# Patient Record
Sex: MALE | Race: WHITE | NOT HISPANIC OR LATINO | Employment: OTHER | URBAN - METROPOLITAN AREA
[De-identification: names, ages, dates, MRNs, and addresses within clinical notes are randomized per-mention and may not be internally consistent; named-entity substitution may affect disease eponyms.]

---

## 2017-01-24 ENCOUNTER — TELEPHONE (OUTPATIENT)
Dept: FAMILY MEDICINE | Facility: CLINIC | Age: 57
End: 2017-01-24

## 2017-01-24 DIAGNOSIS — M25.519 ACUTE SHOULDER PAIN, UNSPECIFIED LATERALITY: Primary | ICD-10-CM

## 2017-01-24 NOTE — TELEPHONE ENCOUNTER
----- Message from Leida Tony sent at 1/23/2017 11:06 AM CST -----  Contact: spouse  Pt called concerning shoulder pain. Please advise [phone]

## 2017-01-24 NOTE — TELEPHONE ENCOUNTER
Stated, was seen and treated 12/19/16 left shoulder strain. Was told to call back if it did not get any better. Requesting a referral to see an ORTHO doctor within [facility] network. Please advise.

## 2017-01-27 RX ORDER — GLYBURIDE-METFORMIN HYDROCHLORIDE 5; 500 MG/1; MG/1
TABLET ORAL
Qty: 180 TABLET | Refills: 3 | Status: SHIPPED | OUTPATIENT
Start: 2017-01-27 | End: 2017-08-10 | Stop reason: SDUPTHER

## 2017-02-02 ENCOUNTER — TELEPHONE (OUTPATIENT)
Dept: FAMILY MEDICINE | Facility: CLINIC | Age: 57
End: 2017-02-02

## 2017-02-02 ENCOUNTER — PATIENT MESSAGE (OUTPATIENT)
Dept: FAMILY MEDICINE | Facility: CLINIC | Age: 57
End: 2017-02-02

## 2017-02-02 DIAGNOSIS — M25.519: Primary | ICD-10-CM

## 2017-02-02 NOTE — TELEPHONE ENCOUNTER
----- Message from Shanda Bernal sent at 2/1/2017  9:08 AM CST -----  Contact: spouse - Tammie  Calling re referral for shoulder surgeon . She states it has been over a week . He is in a lot of pain   .    [phone]    LL

## 2017-02-09 DIAGNOSIS — E11.9 TYPE 2 DIABETES MELLITUS WITHOUT COMPLICATION: ICD-10-CM

## 2017-04-21 RX ORDER — LISINOPRIL 5 MG/1
TABLET ORAL
Qty: 180 TABLET | Refills: 3 | Status: SHIPPED | OUTPATIENT
Start: 2017-04-21 | End: 2018-02-05 | Stop reason: SDUPTHER

## 2017-04-21 RX ORDER — LOVASTATIN 40 MG/1
TABLET ORAL
Qty: 90 TABLET | Refills: 3 | Status: SHIPPED | OUTPATIENT
Start: 2017-04-21 | End: 2019-04-22

## 2017-04-24 DIAGNOSIS — E11.9 DIABETES MELLITUS WITHOUT COMPLICATION: ICD-10-CM

## 2017-04-24 NOTE — TELEPHONE ENCOUNTER
----- Message from Delfina Black sent at 4/24/2017 11:18 AM CDT -----  Contact: SELF  REFILL: sitagliptin (JANUVIA) 100 MG Tab    PLEASE SEND TO [facility]

## 2017-07-27 ENCOUNTER — OFFICE VISIT (OUTPATIENT)
Dept: FAMILY MEDICINE | Facility: CLINIC | Age: 57
End: 2017-07-27
Payer: COMMERCIAL

## 2017-07-27 ENCOUNTER — LAB VISIT (OUTPATIENT)
Dept: LAB | Facility: HOSPITAL | Age: 57
End: 2017-07-27
Attending: FAMILY MEDICINE
Payer: COMMERCIAL

## 2017-07-27 VITALS
TEMPERATURE: 98 F | SYSTOLIC BLOOD PRESSURE: 106 MMHG | WEIGHT: 203.63 LBS | HEART RATE: 104 BPM | HEIGHT: 69 IN | OXYGEN SATURATION: 97 % | BODY MASS INDEX: 30.16 KG/M2 | DIASTOLIC BLOOD PRESSURE: 76 MMHG

## 2017-07-27 DIAGNOSIS — E11.59 HYPERTENSION ASSOCIATED WITH DIABETES: ICD-10-CM

## 2017-07-27 DIAGNOSIS — E11.9 TYPE 2 DIABETES MELLITUS WITHOUT COMPLICATION, WITH LONG-TERM CURRENT USE OF INSULIN: ICD-10-CM

## 2017-07-27 DIAGNOSIS — Z23 PNEUMOCOCCAL VACCINATION ADMINISTERED AT CURRENT VISIT: ICD-10-CM

## 2017-07-27 DIAGNOSIS — N52.9 ERECTILE DYSFUNCTION, UNSPECIFIED ERECTILE DYSFUNCTION TYPE: ICD-10-CM

## 2017-07-27 DIAGNOSIS — Z00.00 ROUTINE PHYSICAL EXAMINATION: ICD-10-CM

## 2017-07-27 DIAGNOSIS — Z00.00 ROUTINE PHYSICAL EXAMINATION: Primary | ICD-10-CM

## 2017-07-27 DIAGNOSIS — I15.2 HYPERTENSION ASSOCIATED WITH DIABETES: ICD-10-CM

## 2017-07-27 DIAGNOSIS — Z79.4 TYPE 2 DIABETES MELLITUS WITHOUT COMPLICATION, WITH LONG-TERM CURRENT USE OF INSULIN: ICD-10-CM

## 2017-07-27 LAB
ALBUMIN SERPL BCP-MCNC: 4 G/DL
ALP SERPL-CCNC: 68 U/L
ALT SERPL W/O P-5'-P-CCNC: 36 U/L
ANION GAP SERPL CALC-SCNC: 15 MMOL/L
AST SERPL-CCNC: 23 U/L
BASOPHILS # BLD AUTO: 0.06 K/UL
BASOPHILS NFR BLD: 0.9 %
BILIRUB SERPL-MCNC: 0.9 MG/DL
BUN SERPL-MCNC: 15 MG/DL
CALCIUM SERPL-MCNC: 9.8 MG/DL
CHLORIDE SERPL-SCNC: 102 MMOL/L
CHOLEST/HDLC SERPL: 4.4 {RATIO}
CO2 SERPL-SCNC: 21 MMOL/L
COMPLEXED PSA SERPL-MCNC: 0.47 NG/ML
CREAT SERPL-MCNC: 1.5 MG/DL
DIFFERENTIAL METHOD: NORMAL
EOSINOPHIL # BLD AUTO: 0.3 K/UL
EOSINOPHIL NFR BLD: 4.8 %
ERYTHROCYTE [DISTWIDTH] IN BLOOD BY AUTOMATED COUNT: 13.4 %
EST. GFR  (AFRICAN AMERICAN): 59.7 ML/MIN/1.73 M^2
EST. GFR  (NON AFRICAN AMERICAN): 51.7 ML/MIN/1.73 M^2
GLUCOSE SERPL-MCNC: 291 MG/DL
HCT VFR BLD AUTO: 47.1 %
HDL/CHOLESTEROL RATIO: 22.8 %
HDLC SERPL-MCNC: 180 MG/DL
HDLC SERPL-MCNC: 41 MG/DL
HGB BLD-MCNC: 16.3 G/DL
LDLC SERPL CALC-MCNC: 102 MG/DL
LYMPHOCYTES # BLD AUTO: 2.4 K/UL
LYMPHOCYTES NFR BLD: 37 %
MCH RBC QN AUTO: 30.2 PG
MCHC RBC AUTO-ENTMCNC: 34.6 G/DL
MCV RBC AUTO: 87 FL
MONOCYTES # BLD AUTO: 0.4 K/UL
MONOCYTES NFR BLD: 6.7 %
NEUTROPHILS # BLD AUTO: 3.3 K/UL
NEUTROPHILS NFR BLD: 50.3 %
NONHDLC SERPL-MCNC: 139 MG/DL
PLATELET # BLD AUTO: 203 K/UL
PMV BLD AUTO: 10.6 FL
POTASSIUM SERPL-SCNC: 4.9 MMOL/L
PROT SERPL-MCNC: 7.6 G/DL
RBC # BLD AUTO: 5.39 M/UL
SODIUM SERPL-SCNC: 138 MMOL/L
T4 FREE SERPL-MCNC: 0.87 NG/DL
TRIGL SERPL-MCNC: 185 MG/DL
TSH SERPL DL<=0.005 MIU/L-ACNC: 1.12 UIU/ML
WBC # BLD AUTO: 6.52 K/UL

## 2017-07-27 PROCEDURE — 84153 ASSAY OF PSA TOTAL: CPT

## 2017-07-27 PROCEDURE — 90732 PPSV23 VACC 2 YRS+ SUBQ/IM: CPT | Mod: S$GLB,,, | Performed by: FAMILY MEDICINE

## 2017-07-27 PROCEDURE — 86592 SYPHILIS TEST NON-TREP QUAL: CPT

## 2017-07-27 PROCEDURE — 99396 PREV VISIT EST AGE 40-64: CPT | Mod: 25,S$GLB,, | Performed by: FAMILY MEDICINE

## 2017-07-27 PROCEDURE — 83036 HEMOGLOBIN GLYCOSYLATED A1C: CPT

## 2017-07-27 PROCEDURE — 84443 ASSAY THYROID STIM HORMONE: CPT

## 2017-07-27 PROCEDURE — 36415 COLL VENOUS BLD VENIPUNCTURE: CPT | Mod: PO

## 2017-07-27 PROCEDURE — 90471 IMMUNIZATION ADMIN: CPT | Mod: S$GLB,,, | Performed by: FAMILY MEDICINE

## 2017-07-27 PROCEDURE — 80074 ACUTE HEPATITIS PANEL: CPT

## 2017-07-27 PROCEDURE — 99999 PR PBB SHADOW E&M-EST. PATIENT-LVL IV: CPT | Mod: PBBFAC,,, | Performed by: FAMILY MEDICINE

## 2017-07-27 PROCEDURE — 85025 COMPLETE CBC W/AUTO DIFF WBC: CPT

## 2017-07-27 PROCEDURE — 80061 LIPID PANEL: CPT

## 2017-07-27 PROCEDURE — 84439 ASSAY OF FREE THYROXINE: CPT

## 2017-07-27 PROCEDURE — 80053 COMPREHEN METABOLIC PANEL: CPT

## 2017-07-27 PROCEDURE — 86703 HIV-1/HIV-2 1 RESULT ANTBDY: CPT

## 2017-07-27 RX ORDER — HYDROCODONE BITARTRATE AND ACETAMINOPHEN 7.5; 325 MG/1; MG/1
TABLET ORAL
COMMUNITY
Start: 2017-05-30 | End: 2017-08-10

## 2017-07-27 RX ORDER — SILDENAFIL CITRATE 20 MG/1
20 TABLET ORAL 3 TIMES DAILY
Qty: 60 TABLET | Refills: 0 | Status: SHIPPED | OUTPATIENT
Start: 2017-07-27 | End: 2017-08-10

## 2017-07-27 RX ORDER — OXYCODONE AND ACETAMINOPHEN 10; 325 MG/1; MG/1
TABLET ORAL
COMMUNITY
Start: 2017-04-24 | End: 2017-08-10

## 2017-07-27 NOTE — PROGRESS NOTES
[facility]  Progress Note    SUBJECTIVE:     Chief Complaint   Patient presents with    Diabetes       HPI   Stanley Faustin  is a 57 y.o. male here for routine physical exam and for follow up of his chronic conditions. He admits been eating very bad and hasn't been checking his sugars. Patient has no other new complaints/problems at this time.      Review of patient's allergies indicates:  No Known Allergies    Past Medical History:   Diagnosis Date    Coronary artery disease     s/p stent    Diabetes mellitus     Diabetes mellitus type II     Hyperlipidemia     Hypertension     Myocardial infarction      Past Surgical History:   Procedure Laterality Date    CORONARY ANGIOPLASTY WITH STENT PLACEMENT      TONSILLECTOMY      UVULOPALATOPHARYNGOPLASTY      for REJI     Family History   Problem Relation Age of Onset    Diabetes Mother     Diabetes Father     Heart disease Father     Mental illness Brother      suicide    Cancer Neg Hx      prostate or colon     Social History   Substance Use Topics    Smoking status: Current Every Day Smoker     Packs/day: 0.50     Types: Cigarettes    Smokeless tobacco: Not on file      Comment:  x 34 yr.  Works at [employer].  Three kids.  Walks a lot at work.      Alcohol use Yes      Comment: only on occasion        Review of Systems   Constitutional: Negative for chills, diaphoresis and fever.   HENT: Negative for congestion, ear pain and sore throat.    Eyes: Negative for photophobia and discharge.   Respiratory: Negative for cough, shortness of breath and wheezing.    Cardiovascular: Negative for chest pain and palpitations.   Gastrointestinal: Negative for abdominal pain, constipation, diarrhea, nausea and vomiting.   Genitourinary: Negative for dysuria and hematuria.   Musculoskeletal: Negative for back pain and myalgias.   Skin: Negative for itching and rash.   Neurological: Negative for dizziness, sensory change, focal  weakness, weakness and headaches.   All other systems reviewed and are negative.    OBJECTIVE:     Vitals:    07/27/17 0948   BP: 106/76   Pulse: 104   Temp: 98.2 °F (36.8 °C)     Body mass index is 30.07 kg/m².    Physical Exam   Constitutional: He is oriented to person, place, and time and well-developed, well-nourished, and in no distress. No distress.   HENT:   Head: Normocephalic and atraumatic.   Right Ear: Tympanic membrane is not perforated, not erythematous and not bulging. No hemotympanum.   Left Ear: Tympanic membrane is not perforated, not erythematous and not bulging. No hemotympanum.   Mouth/Throat: Oropharynx is clear and moist. No oropharyngeal exudate.   Eyes: Conjunctivae and EOM are normal. Pupils are equal, round, and reactive to light.   Neck: No thyromegaly present.   Cardiovascular: Normal rate, regular rhythm and normal heart sounds.  Exam reveals no gallop and no friction rub.    No murmur heard.  Pulmonary/Chest: Effort normal and breath sounds normal. No respiratory distress. He has no wheezes. He has no rales.   Abdominal: Soft. Bowel sounds are normal. He exhibits no distension. There is no tenderness. There is no rebound and no guarding.   Musculoskeletal: Normal range of motion. He exhibits no edema or tenderness.   Lymphadenopathy:     He has no cervical adenopathy.   Neurological: He is alert and oriented to person, place, and time.   Skin: Skin is warm. No rash noted. He is not diaphoretic. No erythema.     Protective Sensation (w/ 10 gram monofilament):  Right: Intact  Left: Intact    Visual Inspection:  Normal -  Bilateral    Pedal Pulses:   Right: Present  Left: Present    Posterior tibialis:   Right:Present  Left: Present      Old records were reviewed. Labs and/or images were independently reviewed.    ASSESSMENT     1. Routine physical examination    2. Hypertension associated with diabetes    3. Type 2 diabetes mellitus without complication, with long-term current use of  insulin    4. Pneumococcal vaccination administered at current visit    5. Erectile dysfunction, unspecified erectile dysfunction type        PLAN:     Routine physical examination  -     CBC auto differential; Future  -     Comprehensive metabolic panel; Future  -     Hemoglobin A1c; Future  -     Lipid panel; Future  -     TSH; Future  -     T4, free; Future  -     PSA, Screening; Future; Expected date: 07/27/2017  -     Hepatitis panel, acute; Future; Expected date: 07/27/2017  -     RPR; Future; Expected date: 07/27/2017  -     HIV-1 and HIV-2 antibodies; Future; Expected date: 07/27/2017  -     We briefly discussed diet, exercise, and routine preventive exams. All questions and comments addressed.    Hypertension associated with diabetes   -     Stable. Continue current regimen.    Type 2 diabetes mellitus without complication, with long-term current use of insulin   -     Instructed patient to take daily glucose AM logs and to write them down to bring with on next visit. Advised patient to decrease intake of carbohydrates/simple sugars.         Pneumococcal vaccination administered at current visit  -     Pneumococcal Polysaccharide Vaccine (23 Valent) (SQ/IM)    Erectile dysfunction, unspecified erectile dysfunction type  -     sildenafil (REVATIO) 20 mg Tab; Take 1 tablet (20 mg total) by mouth 3 (three) times daily PRN.  Dispense: 60 tablet; Refill: 0  -     Gave Rx and coupon to take to pharmacy.      RTC PRN or 2 weeks for DM management.    Masood Khan MD  07/27/2017 10:07 AM

## 2017-07-28 LAB
ESTIMATED AVG GLUCOSE: 255 MG/DL
HAV IGM SERPL QL IA: NEGATIVE
HBA1C MFR BLD HPLC: 10.5 %
HBV CORE IGM SERPL QL IA: NEGATIVE
HBV SURFACE AG SERPL QL IA: NEGATIVE
HCV AB SERPL QL IA: NEGATIVE
HIV 1+2 AB+HIV1 P24 AG SERPL QL IA: NEGATIVE
RPR SER QL: NORMAL

## 2017-08-10 ENCOUNTER — OFFICE VISIT (OUTPATIENT)
Dept: FAMILY MEDICINE | Facility: CLINIC | Age: 57
End: 2017-08-10
Payer: COMMERCIAL

## 2017-08-10 VITALS
WEIGHT: 201.25 LBS | DIASTOLIC BLOOD PRESSURE: 80 MMHG | BODY MASS INDEX: 29.81 KG/M2 | SYSTOLIC BLOOD PRESSURE: 110 MMHG | OXYGEN SATURATION: 97 % | HEIGHT: 69 IN | HEART RATE: 97 BPM | TEMPERATURE: 99 F

## 2017-08-10 PROCEDURE — 99999 PR PBB SHADOW E&M-EST. PATIENT-LVL III: CPT | Mod: PBBFAC,,, | Performed by: FAMILY MEDICINE

## 2017-08-10 PROCEDURE — 3046F HEMOGLOBIN A1C LEVEL >9.0%: CPT | Mod: S$GLB,,, | Performed by: FAMILY MEDICINE

## 2017-08-10 PROCEDURE — 3079F DIAST BP 80-89 MM HG: CPT | Mod: S$GLB,,, | Performed by: FAMILY MEDICINE

## 2017-08-10 PROCEDURE — 3008F BODY MASS INDEX DOCD: CPT | Mod: S$GLB,,, | Performed by: FAMILY MEDICINE

## 2017-08-10 PROCEDURE — 99214 OFFICE O/P EST MOD 30 MIN: CPT | Mod: S$GLB,,, | Performed by: FAMILY MEDICINE

## 2017-08-10 PROCEDURE — 4010F ACE/ARB THERAPY RXD/TAKEN: CPT | Mod: S$GLB,,, | Performed by: FAMILY MEDICINE

## 2017-08-10 PROCEDURE — 3074F SYST BP LT 130 MM HG: CPT | Mod: S$GLB,,, | Performed by: FAMILY MEDICINE

## 2017-08-10 RX ORDER — GLYBURIDE-METFORMIN HYDROCHLORIDE 5; 500 MG/1; MG/1
2 TABLET ORAL 2 TIMES DAILY WITH MEALS
Qty: 180 TABLET | Refills: 3
Start: 2017-08-10 | End: 2017-09-11 | Stop reason: SDUPTHER

## 2017-08-10 RX ORDER — LANCETS
1 EACH MISCELLANEOUS 3 TIMES DAILY
Qty: 100 EACH | Refills: 5 | Status: SHIPPED | OUTPATIENT
Start: 2017-08-10

## 2017-08-10 RX ORDER — INSULIN PUMP SYRINGE, 3 ML
EACH MISCELLANEOUS
Qty: 1 EACH | Refills: 0 | Status: SHIPPED | OUTPATIENT
Start: 2017-08-10

## 2017-08-10 NOTE — PROGRESS NOTES
[facility]  Progress Note    SUBJECTIVE:     Chief Complaint   Patient presents with    Diabetes     follow up       HPI   Stanley Faustin  is a 57 y.o. male here for follow up of his diabetes. He has been trying to cut out the sugary additives. Takes his meds as directed, but FBG still in the 200s. Patient has no other new complaints/problems at this time.      Review of patient's allergies indicates:  No Known Allergies    Past Medical History:   Diagnosis Date    Coronary artery disease     s/p stent    Diabetes mellitus     Diabetes mellitus type II     Hyperlipidemia     Hypertension     Myocardial infarction      Past Surgical History:   Procedure Laterality Date    CORONARY ANGIOPLASTY WITH STENT PLACEMENT      TONSILLECTOMY      UVULOPALATOPHARYNGOPLASTY      for REJI     Family History   Problem Relation Age of Onset    Diabetes Mother     Diabetes Father     Heart disease Father     Mental illness Brother      suicide    Cancer Neg Hx      prostate or colon     Social History   Substance Use Topics    Smoking status: Current Every Day Smoker     Packs/day: 0.50     Types: Cigarettes    Smokeless tobacco: Never Used      Comment:  x 34 yr.  Works at [employer].  Three kids.  Walks a lot at work.      Alcohol use Yes      Comment: only on occasion        Review of Systems   Constitutional: Negative for chills, fever and malaise/fatigue.   HENT: Negative.    Respiratory: Negative.  Negative for cough and shortness of breath.    Cardiovascular: Negative.  Negative for chest pain.   Gastrointestinal: Negative.  Negative for abdominal pain, nausea and vomiting.   Genitourinary: Negative.    Neurological: Negative for weakness and headaches.   All other systems reviewed and are negative.    OBJECTIVE:     Vitals:    08/10/17 1319   BP: 110/80   Pulse: 97   Temp: 98.5 °F (36.9 °C)     Body mass index is 29.72 kg/m².    Physical Exam   Constitutional: He is oriented  to person, place, and time and well-developed, well-nourished, and in no distress. No distress.   HENT:   Head: Normocephalic and atraumatic.   Eyes: Conjunctivae and EOM are normal.   Cardiovascular: Normal rate, regular rhythm and normal heart sounds.  Exam reveals no gallop and no friction rub.    No murmur heard.  Pulmonary/Chest: Effort normal and breath sounds normal. No respiratory distress. He has no wheezes. He has no rales.   Abdominal: Soft. Bowel sounds are normal. He exhibits no distension. There is no tenderness.   Neurological: He is alert and oriented to person, place, and time.   Skin: Skin is warm. He is not diaphoretic.       Old records were reviewed. Labs and/or images were independently reviewed.    ASSESSMENT     1. Uncontrolled type 2 diabetes mellitus with stage 3 chronic kidney disease, without long-term current use of insulin        PLAN:     Uncontrolled type 2 diabetes mellitus with stage 3 chronic kidney disease, without long-term current use of insulin  -     glyBURIDE-metformin 5-500 mg (GLUCOVANCE) 5-500 mg Tab; Take 2 tablets by mouth 2 (two) times daily with meals.  Dispense: 180 tablet; Refill: 3  -     Start empagliflozin 10 mg Tab; Take 10 mg by mouth once daily.  Dispense: 30 tablet; Refill: 3  -     blood-glucose meter kit; Use as instructed  Dispense: 1 each; Refill: 0  -     blood sugar diagnostic Strp; 1 strip by Misc.(Non-Drug; Combo Route) route 3 (three) times daily.  Dispense: 100 strip; Refill: 5  -     lancets Misc; 1 application by Misc.(Non-Drug; Combo Route) route 3 (three) times daily.  Dispense: 100 each; Refill: 5  -     Instructed patient to take daily glucose AM logs and to write them down to bring with on next visit. Advised patient to decrease intake of carbohydrates/simple sugars.  -     Advised to drink plenty of water and stop the sugary drinks, will need to recheck kidney function to see if it is acute or chronic kidney disease.             RTC PRN or 2  weeks for DM management.    Masood Khan MD  08/10/2017 1:39 PM

## 2017-09-11 ENCOUNTER — OFFICE VISIT (OUTPATIENT)
Dept: FAMILY MEDICINE | Facility: CLINIC | Age: 57
End: 2017-09-11
Payer: COMMERCIAL

## 2017-09-11 ENCOUNTER — LAB VISIT (OUTPATIENT)
Dept: LAB | Facility: HOSPITAL | Age: 57
End: 2017-09-11
Attending: FAMILY MEDICINE
Payer: COMMERCIAL

## 2017-09-11 VITALS
WEIGHT: 199.5 LBS | OXYGEN SATURATION: 98 % | TEMPERATURE: 98 F | DIASTOLIC BLOOD PRESSURE: 82 MMHG | BODY MASS INDEX: 29.55 KG/M2 | SYSTOLIC BLOOD PRESSURE: 100 MMHG | HEIGHT: 69 IN | HEART RATE: 99 BPM

## 2017-09-11 DIAGNOSIS — I15.2 HYPERTENSION ASSOCIATED WITH DIABETES: ICD-10-CM

## 2017-09-11 DIAGNOSIS — E11.59 HYPERTENSION ASSOCIATED WITH DIABETES: ICD-10-CM

## 2017-09-11 DIAGNOSIS — Z79.4 TYPE 2 DIABETES MELLITUS WITHOUT COMPLICATION, WITH LONG-TERM CURRENT USE OF INSULIN: ICD-10-CM

## 2017-09-11 DIAGNOSIS — E11.9 TYPE 2 DIABETES MELLITUS WITHOUT COMPLICATION, WITH LONG-TERM CURRENT USE OF INSULIN: ICD-10-CM

## 2017-09-11 PROCEDURE — 82985 ASSAY OF GLYCATED PROTEIN: CPT

## 2017-09-11 PROCEDURE — 4010F ACE/ARB THERAPY RXD/TAKEN: CPT | Mod: S$GLB,,, | Performed by: FAMILY MEDICINE

## 2017-09-11 PROCEDURE — 3079F DIAST BP 80-89 MM HG: CPT | Mod: S$GLB,,, | Performed by: FAMILY MEDICINE

## 2017-09-11 PROCEDURE — 3074F SYST BP LT 130 MM HG: CPT | Mod: S$GLB,,, | Performed by: FAMILY MEDICINE

## 2017-09-11 PROCEDURE — 3008F BODY MASS INDEX DOCD: CPT | Mod: S$GLB,,, | Performed by: FAMILY MEDICINE

## 2017-09-11 PROCEDURE — 36415 COLL VENOUS BLD VENIPUNCTURE: CPT | Mod: PO

## 2017-09-11 PROCEDURE — 3046F HEMOGLOBIN A1C LEVEL >9.0%: CPT | Mod: S$GLB,,, | Performed by: FAMILY MEDICINE

## 2017-09-11 PROCEDURE — 99999 PR PBB SHADOW E&M-EST. PATIENT-LVL III: CPT | Mod: PBBFAC,,, | Performed by: FAMILY MEDICINE

## 2017-09-11 PROCEDURE — 99214 OFFICE O/P EST MOD 30 MIN: CPT | Mod: S$GLB,,, | Performed by: FAMILY MEDICINE

## 2017-09-11 RX ORDER — GLYBURIDE-METFORMIN HYDROCHLORIDE 5; 500 MG/1; MG/1
2 TABLET ORAL 2 TIMES DAILY WITH MEALS
Qty: 180 TABLET | Refills: 3 | Status: SHIPPED | OUTPATIENT
Start: 2017-09-11 | End: 2018-09-24 | Stop reason: SDUPTHER

## 2017-09-11 NOTE — PROGRESS NOTES
[facility]  Progress Note    SUBJECTIVE:     Chief Complaint   Patient presents with    Diabetes       HPI   Stanley Faustin  is a 57 y.o. male here for follow-up of his diabetes. He has cut out most of the crystal light, down to one a day. sugars have been around 80-130s. Takes meds as directed.    Review of patient's allergies indicates:  No Known Allergies    Past Medical History:   Diagnosis Date    Coronary artery disease     s/p stent    Diabetes mellitus     Diabetes mellitus type II     Hyperlipidemia     Hypertension     Myocardial infarction      Past Surgical History:   Procedure Laterality Date    CORONARY ANGIOPLASTY WITH STENT PLACEMENT      TONSILLECTOMY      UVULOPALATOPHARYNGOPLASTY      for REJI     Family History   Problem Relation Age of Onset    Diabetes Mother     Diabetes Father     Heart disease Father     Mental illness Brother      suicide    Cancer Neg Hx      prostate or colon     Social History   Substance Use Topics    Smoking status: Current Every Day Smoker     Packs/day: 0.50     Types: Cigarettes    Smokeless tobacco: Never Used      Comment:  x 34 yr.  Works at [employer].  Three kids.  Walks a lot at work.      Alcohol use Yes      Comment: only on occasion        Review of Systems   Constitutional: Negative for chills, fever and malaise/fatigue.   HENT: Negative.    Respiratory: Negative.  Negative for cough and shortness of breath.    Cardiovascular: Negative.  Negative for chest pain.   Gastrointestinal: Negative.  Negative for abdominal pain, nausea and vomiting.   Genitourinary: Negative.    Neurological: Negative for weakness and headaches.   All other systems reviewed and are negative.    OBJECTIVE:     Vitals:    09/11/17 1105   BP: 100/82   Pulse: 99   Temp: 98.4 °F (36.9 °C)     Body mass index is 29.46 kg/m².    Physical Exam   Constitutional: He is oriented to person, place, and time and well-developed, well-nourished,  and in no distress. No distress.   HENT:   Head: Normocephalic and atraumatic.   Nose: Nose normal.   Eyes: Conjunctivae and EOM are normal.   Cardiovascular: Normal rate, regular rhythm and normal heart sounds.  Exam reveals no gallop and no friction rub.    No murmur heard.  Pulmonary/Chest: Effort normal and breath sounds normal. No respiratory distress. He has no wheezes. He has no rales. He exhibits no tenderness.   Abdominal: Soft. Bowel sounds are normal. He exhibits no distension. There is no tenderness. There is no rebound.   Neurological: He is alert and oriented to person, place, and time.   Skin: Skin is warm. He is not diaphoretic.       Old records were reviewed. Labs and/or images were independently reviewed.    ASSESSMENT     1. Uncontrolled type 2 diabetes mellitus with stage 3 chronic kidney disease, without long-term current use of insulin    2. Type 2 diabetes mellitus without complication, with long-term current use of insulin    3. Hypertension associated with diabetes        PLAN:     Uncontrolled type 2 diabetes mellitus with stage 3 chronic kidney disease, without long-term current use of insulin  -     glyBURIDE-metformin 5-500 mg (GLUCOVANCE) 5-500 mg Tab; Take 2 tablets by mouth 2 (two) times daily with meals.  Dispense: 180 tablet; Refill: 3  -     Fructosamine; Future; Expected date: 09/11/2017  -     Diabetic Eye Screening Photo; Future  -     Instructed patient to take daily glucose AM logs and to write them down to bring with on next visit. Advised patient to decrease intake of carbohydrates/simple sugars.         Hypertension associated with diabetes   -     Stable. Continue current regimen.      RTC PRN or 2-3 weeks for diabetes follow-up.  More than 50% of the encounter was spent counseling patient about diabetes, in an outpatient setting. Total time spent counseling patient: 25.      Masood Khan MD  09/11/2017 11:19 AM

## 2017-09-22 LAB — FRUCTOSAMINE SERPL-SCNC: 309 UMOL /L (ref 0–285)

## 2017-10-09 ENCOUNTER — LAB VISIT (OUTPATIENT)
Dept: LAB | Facility: HOSPITAL | Age: 57
End: 2017-10-09
Attending: FAMILY MEDICINE
Payer: COMMERCIAL

## 2017-10-09 ENCOUNTER — OFFICE VISIT (OUTPATIENT)
Dept: FAMILY MEDICINE | Facility: CLINIC | Age: 57
End: 2017-10-09
Payer: COMMERCIAL

## 2017-10-09 VITALS
HEIGHT: 69 IN | HEART RATE: 98 BPM | DIASTOLIC BLOOD PRESSURE: 70 MMHG | BODY MASS INDEX: 29.73 KG/M2 | OXYGEN SATURATION: 97 % | WEIGHT: 200.75 LBS | TEMPERATURE: 98 F | SYSTOLIC BLOOD PRESSURE: 110 MMHG

## 2017-10-09 DIAGNOSIS — Z79.4 TYPE 2 DIABETES MELLITUS WITHOUT COMPLICATION, WITH LONG-TERM CURRENT USE OF INSULIN: ICD-10-CM

## 2017-10-09 DIAGNOSIS — E11.9 TYPE 2 DIABETES MELLITUS WITHOUT COMPLICATION, WITH LONG-TERM CURRENT USE OF INSULIN: Primary | ICD-10-CM

## 2017-10-09 DIAGNOSIS — I15.2 HYPERTENSION ASSOCIATED WITH DIABETES: ICD-10-CM

## 2017-10-09 DIAGNOSIS — S29.012A STRAIN OF RHOMBOID MUSCLE, INITIAL ENCOUNTER: ICD-10-CM

## 2017-10-09 DIAGNOSIS — Z79.4 TYPE 2 DIABETES MELLITUS WITHOUT COMPLICATION, WITH LONG-TERM CURRENT USE OF INSULIN: Primary | ICD-10-CM

## 2017-10-09 DIAGNOSIS — E11.9 TYPE 2 DIABETES MELLITUS WITHOUT COMPLICATION, WITH LONG-TERM CURRENT USE OF INSULIN: ICD-10-CM

## 2017-10-09 DIAGNOSIS — E11.59 HYPERTENSION ASSOCIATED WITH DIABETES: ICD-10-CM

## 2017-10-09 LAB
ALBUMIN SERPL BCP-MCNC: 3.8 G/DL
ALP SERPL-CCNC: 59 U/L
ALT SERPL W/O P-5'-P-CCNC: 36 U/L
ANION GAP SERPL CALC-SCNC: 12 MMOL/L
AST SERPL-CCNC: 28 U/L
BILIRUB SERPL-MCNC: 0.7 MG/DL
BUN SERPL-MCNC: 19 MG/DL
CALCIUM SERPL-MCNC: 10 MG/DL
CHLORIDE SERPL-SCNC: 106 MMOL/L
CO2 SERPL-SCNC: 22 MMOL/L
CREAT SERPL-MCNC: 1.4 MG/DL
EST. GFR  (AFRICAN AMERICAN): >60 ML/MIN/1.73 M^2
EST. GFR  (NON AFRICAN AMERICAN): 56.2 ML/MIN/1.73 M^2
ESTIMATED AVG GLUCOSE: 169 MG/DL
GLUCOSE SERPL-MCNC: 188 MG/DL
HBA1C MFR BLD HPLC: 7.5 %
POTASSIUM SERPL-SCNC: 5.4 MMOL/L
PROT SERPL-MCNC: 7.5 G/DL
SODIUM SERPL-SCNC: 140 MMOL/L

## 2017-10-09 PROCEDURE — 99999 PR PBB SHADOW E&M-EST. PATIENT-LVL III: CPT | Mod: PBBFAC,,, | Performed by: FAMILY MEDICINE

## 2017-10-09 PROCEDURE — 36415 COLL VENOUS BLD VENIPUNCTURE: CPT | Mod: PO

## 2017-10-09 PROCEDURE — 83036 HEMOGLOBIN GLYCOSYLATED A1C: CPT

## 2017-10-09 PROCEDURE — 80053 COMPREHEN METABOLIC PANEL: CPT

## 2017-10-09 PROCEDURE — 99214 OFFICE O/P EST MOD 30 MIN: CPT | Mod: S$GLB,,, | Performed by: FAMILY MEDICINE

## 2017-10-09 RX ORDER — CYCLOBENZAPRINE HCL 5 MG
5 TABLET ORAL 3 TIMES DAILY PRN
Qty: 30 TABLET | Refills: 0 | Status: SHIPPED | OUTPATIENT
Start: 2017-10-09 | End: 2018-01-25

## 2017-10-09 RX ORDER — DICLOFENAC SODIUM 10 MG/G
2 GEL TOPICAL 4 TIMES DAILY
Qty: 100 G | Refills: 0 | Status: SHIPPED | OUTPATIENT
Start: 2017-10-09 | End: 2019-02-04 | Stop reason: SDUPTHER

## 2017-10-09 NOTE — PROGRESS NOTES
[facility]  Progress Note    SUBJECTIVE:     Chief Complaint   Patient presents with    Back Pain       HPI   Stanley Faustin  is a 57 y.o. male here for c/o right upper back pain. Onset was over a week ago. He does do some heavy lifting. Rates pain as moderate. Certain positions make it worst. Denies any falls/trauma. For his diabetes, he also has been trying to cut back on the carbs. Thinks his sugars are much improved. No chest pain, SOB, visual changes.     Review of patient's allergies indicates:  No Known Allergies    Past Medical History:   Diagnosis Date    Coronary artery disease     s/p stent    Diabetes mellitus     Diabetes mellitus type II     Hyperlipidemia     Hypertension     Myocardial infarction      Past Surgical History:   Procedure Laterality Date    CORONARY ANGIOPLASTY WITH STENT PLACEMENT      TONSILLECTOMY      UVULOPALATOPHARYNGOPLASTY      for REJI     Family History   Problem Relation Age of Onset    Diabetes Mother     Diabetes Father     Heart disease Father     Mental illness Brother      suicide    Cancer Neg Hx      prostate or colon     Social History   Substance Use Topics    Smoking status: Current Every Day Smoker     Packs/day: 0.50     Types: Cigarettes    Smokeless tobacco: Never Used      Comment:  x 34 yr.  Works at [employer].  Three kids.  Walks a lot at work.      Alcohol use Yes      Comment: only on occasion        Review of Systems   Constitutional: Negative for chills, fever and malaise/fatigue.   Respiratory: Negative.    Cardiovascular: Negative.    Gastrointestinal: Negative for abdominal pain, constipation and diarrhea.   Musculoskeletal: Positive for back pain. Negative for falls, myalgias and neck pain.   Neurological: Negative for weakness and headaches.     OBJECTIVE:     Vitals:    10/09/17 0830   BP: 110/70   Pulse: 98   Temp: 98.1 °F (36.7 °C)     Body mass index is 29.64 kg/m².    Physical Exam    Constitutional: He is oriented to person, place, and time. He appears distressed (mild).   HENT:   Head: Normocephalic and atraumatic.   Neck: Normal range of motion. Neck supple.   Musculoskeletal: He exhibits tenderness (to palpation of R rhomboid muscle). He exhibits no edema.   Neurological: He is alert and oriented to person, place, and time. No cranial nerve deficit.   Skin: Skin is warm. He is not diaphoretic.       Old records were reviewed. Labs and/or images were independently reviewed.    ASSESSMENT     1. Type 2 diabetes mellitus without complication, with long-term current use of insulin    2. Hypertension associated with diabetes    3. Strain of rhomboid muscle, initial encounter        PLAN:     Type 2 diabetes mellitus without complication, with long-term current use of insulin  -     Hemoglobin A1c; Future  -     Comprehensive metabolic panel; Future  -     Instructed patient to take daily glucose AM logs and to write them down to bring with on next visit. Advised patient to decrease intake of carbohydrates/simple sugars.         Hypertension associated with diabetes   -     Stable. Continue current regimen.    Strain of rhomboid muscle, initial encounter  -     Start diclofenac sodium (VOLTAREN) 1 % Gel; Apply 2 g topically 4 (four) times daily.  Dispense: 100 g; Refill: 0  -     Start cyclobenzaprine (FLEXERIL) 5 MG tablet; Take 1 tablet (5 mg total) by mouth 3 (three) times daily as needed for Muscle spasms.  Dispense: 30 tablet; Refill: 0  -     Patient counseled on good posture and stretching exercises. Continue to place ice packs on affected areas 3-4 times daily. Take medications as prescribed. Instructed patient to call MD if symptoms persist or worsen.    RTC KANDY Khan MD  10/09/2017 8:45 AM

## 2017-11-15 ENCOUNTER — OFFICE VISIT (OUTPATIENT)
Dept: FAMILY MEDICINE | Facility: CLINIC | Age: 57
End: 2017-11-15
Payer: COMMERCIAL

## 2017-11-15 VITALS
TEMPERATURE: 98 F | OXYGEN SATURATION: 97 % | SYSTOLIC BLOOD PRESSURE: 116 MMHG | HEART RATE: 102 BPM | HEIGHT: 69 IN | WEIGHT: 197.88 LBS | BODY MASS INDEX: 29.31 KG/M2 | DIASTOLIC BLOOD PRESSURE: 86 MMHG

## 2017-11-15 DIAGNOSIS — E11.22 CONTROLLED TYPE 2 DIABETES MELLITUS WITH STAGE 3 CHRONIC KIDNEY DISEASE, WITHOUT LONG-TERM CURRENT USE OF INSULIN: ICD-10-CM

## 2017-11-15 DIAGNOSIS — I15.2 HYPERTENSION ASSOCIATED WITH DIABETES: ICD-10-CM

## 2017-11-15 DIAGNOSIS — M72.2 PLANTAR FASCIITIS OF LEFT FOOT: Primary | ICD-10-CM

## 2017-11-15 DIAGNOSIS — E11.59 HYPERTENSION ASSOCIATED WITH DIABETES: ICD-10-CM

## 2017-11-15 DIAGNOSIS — N18.30 CONTROLLED TYPE 2 DIABETES MELLITUS WITH STAGE 3 CHRONIC KIDNEY DISEASE, WITHOUT LONG-TERM CURRENT USE OF INSULIN: ICD-10-CM

## 2017-11-15 PROCEDURE — 99214 OFFICE O/P EST MOD 30 MIN: CPT | Mod: S$GLB,,, | Performed by: FAMILY MEDICINE

## 2017-11-15 PROCEDURE — 99999 PR PBB SHADOW E&M-EST. PATIENT-LVL III: CPT | Mod: PBBFAC,,, | Performed by: FAMILY MEDICINE

## 2017-11-15 NOTE — PROGRESS NOTES
[facility]  Progress Note    SUBJECTIVE:     Chief Complaint   Patient presents with    Foot Pain     left heel pain        HPI   Stanley Faustin  is a 57 y.o. male here for c/o left heel pain. This started about a week ago. It is worst in the AM, when he tries to take a few steps. Tried otc meds without relief. No falls/trauma.     Review of patient's allergies indicates:  No Known Allergies    Past Medical History:   Diagnosis Date    Coronary artery disease     s/p stent    Diabetes mellitus     Diabetes mellitus type II     Hyperlipidemia     Hypertension     Myocardial infarction      Past Surgical History:   Procedure Laterality Date    CORONARY ANGIOPLASTY WITH STENT PLACEMENT      TONSILLECTOMY      UVULOPALATOPHARYNGOPLASTY      for REJI     Family History   Problem Relation Age of Onset    Diabetes Mother     Diabetes Father     Heart disease Father     Mental illness Brother      suicide    Cancer Neg Hx      prostate or colon     Social History   Substance Use Topics    Smoking status: Current Every Day Smoker     Packs/day: 0.50     Types: Cigarettes    Smokeless tobacco: Never Used      Comment:  x 34 yr.  Works at [employer].  Three kids.  Walks a lot at work.      Alcohol use Yes      Comment: only on occasion        Review of Systems   Constitutional: Negative for chills and fever.   Musculoskeletal: Positive for joint pain (left heel pain). Negative for falls.   Skin: Negative for itching and rash.     OBJECTIVE:     Vitals:    11/15/17 1020   BP: 116/86   Pulse: 102   Temp: 98 °F (36.7 °C)     Body mass index is 29.22 kg/m².    Physical Exam   Constitutional: He is oriented to person, place, and time. He appears distressed (mild).   HENT:   Head: Normocephalic and atraumatic.   Eyes: Conjunctivae and EOM are normal.   Pulmonary/Chest: Effort normal. No respiratory distress.   Musculoskeletal: He exhibits tenderness (to palpation of left plantar  fascia at heel. good ROM . no erythema or obvious fractures/dislocations).   Neurological: He is alert and oriented to person, place, and time.   Skin: Skin is warm. He is not diaphoretic.       Old records were reviewed. Labs and/or images were independently reviewed.    ASSESSMENT     1. Plantar fasciitis of left foot    2. Controlled type 2 diabetes mellitus with stage 3 chronic kidney disease, without long-term current use of insulin    3. Hypertension associated with diabetes        PLAN:     Plantar fasciitis of left foot   -     Patient counseled on good posture and stretching exercises. Continue to place ice packs on affected areas 3-4 times daily. Take medications as prescribed. Instructed patient to call MD if symptoms persist or worsen.    Controlled type 2 diabetes mellitus with stage 3 chronic kidney disease, without long-term current use of insulin  -     empagliflozin 10 mg Tab; Take 10 mg by mouth once daily.  Dispense: 90 tablet; Refill: 3    Hypertension associated with diabetes   -     Stable. Continue current regimen.      RTC PRN  More than 50% of the encounter was spent counseling patient about plantar fasicitis, in an outpatient setting. Total time spent counseling patient: 25.      Masood Khan MD  11/15/2017 10:37 AM

## 2017-12-05 DIAGNOSIS — N18.30 CONTROLLED TYPE 2 DIABETES MELLITUS WITH STAGE 3 CHRONIC KIDNEY DISEASE, WITHOUT LONG-TERM CURRENT USE OF INSULIN: ICD-10-CM

## 2017-12-05 DIAGNOSIS — E11.22 CONTROLLED TYPE 2 DIABETES MELLITUS WITH STAGE 3 CHRONIC KIDNEY DISEASE, WITHOUT LONG-TERM CURRENT USE OF INSULIN: ICD-10-CM

## 2017-12-13 ENCOUNTER — TELEPHONE (OUTPATIENT)
Dept: OPHTHALMOLOGY | Facility: CLINIC | Age: 57
End: 2017-12-13

## 2017-12-13 ENCOUNTER — OFFICE VISIT (OUTPATIENT)
Dept: OPTOMETRY | Facility: CLINIC | Age: 57
End: 2017-12-13
Payer: COMMERCIAL

## 2017-12-13 ENCOUNTER — OFFICE VISIT (OUTPATIENT)
Dept: FAMILY MEDICINE | Facility: CLINIC | Age: 57
End: 2017-12-13
Payer: COMMERCIAL

## 2017-12-13 ENCOUNTER — LAB VISIT (OUTPATIENT)
Dept: LAB | Facility: HOSPITAL | Age: 57
End: 2017-12-13
Attending: FAMILY MEDICINE
Payer: COMMERCIAL

## 2017-12-13 VITALS
OXYGEN SATURATION: 97 % | HEART RATE: 106 BPM | DIASTOLIC BLOOD PRESSURE: 88 MMHG | HEIGHT: 69 IN | SYSTOLIC BLOOD PRESSURE: 128 MMHG | TEMPERATURE: 98 F

## 2017-12-13 DIAGNOSIS — H57.10 PAIN IN EYE, UNSPECIFIED LATERALITY: Primary | ICD-10-CM

## 2017-12-13 DIAGNOSIS — H57.11 EYE PAIN, RIGHT: Primary | ICD-10-CM

## 2017-12-13 DIAGNOSIS — E11.9 TYPE 2 DIABETES MELLITUS WITHOUT RETINOPATHY: ICD-10-CM

## 2017-12-13 DIAGNOSIS — R51.9 OCULAR HEADACHE: ICD-10-CM

## 2017-12-13 DIAGNOSIS — H25.13 NUCLEAR SCLEROSIS OF BOTH EYES: ICD-10-CM

## 2017-12-13 DIAGNOSIS — Z13.5 SCREENING FOR EYE CONDITION: ICD-10-CM

## 2017-12-13 DIAGNOSIS — R51.9 OCULAR HEADACHE: Primary | ICD-10-CM

## 2017-12-13 LAB
CRP SERPL-MCNC: 9.5 MG/L
ERYTHROCYTE [SEDIMENTATION RATE] IN BLOOD BY WESTERGREN METHOD: 5 MM/HR

## 2017-12-13 PROCEDURE — 36415 COLL VENOUS BLD VENIPUNCTURE: CPT | Mod: PO

## 2017-12-13 PROCEDURE — 99999 PR PBB SHADOW E&M-EST. PATIENT-LVL IV: CPT | Mod: PBBFAC,,, | Performed by: FAMILY MEDICINE

## 2017-12-13 PROCEDURE — 99214 OFFICE O/P EST MOD 30 MIN: CPT | Mod: S$GLB,,, | Performed by: FAMILY MEDICINE

## 2017-12-13 PROCEDURE — 92004 COMPRE OPH EXAM NEW PT 1/>: CPT | Mod: S$GLB,,, | Performed by: OPTOMETRIST

## 2017-12-13 PROCEDURE — 99999 PR PBB SHADOW E&M-EST. PATIENT-LVL II: CPT | Mod: PBBFAC,,, | Performed by: OPTOMETRIST

## 2017-12-13 PROCEDURE — 85651 RBC SED RATE NONAUTOMATED: CPT

## 2017-12-13 PROCEDURE — 86140 C-REACTIVE PROTEIN: CPT

## 2017-12-13 NOTE — LETTER
December 17, 2017      Masood hKan MD  [address]  [place] [address]           [facility]  [address]  [facility] [address]  Phone: [phone]  Fax: [phone]          Patient: Stanley Faustin   MR Number: [id number]   YOB: 1960   Date of Visit: 12/13/2017       Dear Dr. Masood Khan:    Thank you for referring Stanley Faustin to me for evaluation. Attached you will find relevant portions of my assessment and plan of care.    If you have questions, please do not hesitate to call me. I look forward to following Stanley Faustin along with you.    Sincerely,    Lucian Hansen, OD    Enclosure  CC:  No Recipients    If you would like to receive this communication electronically, please contact [email or web address] or [phone] to request more information on [facility] access.    For providers and/or their staff who would like to refer a patient to [facility], please contact us through our one-stop-shop provider referral line, [facility], at [phone].    If you feel you have received this communication in error or would no longer like to receive these types of communications, please e-mail [email or web address]

## 2017-12-13 NOTE — PROGRESS NOTES
[facility]  Progress Note    SUBJECTIVE:     Chief Complaint   Patient presents with    Eye Pain       HPI   Stanley Faustin  is a 57 y.o. male here for having occasional right orbital pain. This started a few months ago where these episodes last about 10-30 seconds, sharp, shoooting pain in the right orbital area. It goes away on its own. Past week or 2, it happened again. No visual changes, migraines, chest pain, SOB. Unsure what caused it.     Review of patient's allergies indicates:  No Known Allergies    Past Medical History:   Diagnosis Date    Coronary artery disease     s/p stent    Diabetes mellitus     Diabetes mellitus type II     Hyperlipidemia     Hypertension     Myocardial infarction      Past Surgical History:   Procedure Laterality Date    CORONARY ANGIOPLASTY WITH STENT PLACEMENT      TONSILLECTOMY      UVULOPALATOPHARYNGOPLASTY      for REJI     Family History   Problem Relation Age of Onset    Diabetes Mother     Diabetes Father     Heart disease Father     Mental illness Brother      suicide    Cancer Neg Hx      prostate or colon     Social History   Substance Use Topics    Smoking status: Current Every Day Smoker     Packs/day: 0.50     Types: Cigarettes    Smokeless tobacco: Never Used      Comment:  x 34 yr.  Works at [employer].  Three kids.  Walks a lot at work.      Alcohol use Yes      Comment: only on occasion        Review of Systems   Constitutional: Negative for chills and fever.   HENT:        + right orbital pain   Eyes: Negative for blurred vision, double vision, photophobia, pain, discharge and redness.   Neurological: Negative for sensory change, speech change, focal weakness and headaches.     OBJECTIVE:     Vitals:    12/13/17 1112   BP: 128/88   Pulse: 106   Temp: 98.2 °F (36.8 °C)     There is no height or weight on file to calculate BMI.    Physical Exam   Constitutional: He is oriented to person, place, and time and  well-developed, well-nourished, and in no distress. No distress.   HENT:   Head: Normocephalic and atraumatic.   Nose: Nose normal.   No temporal tenderness   Eyes: Conjunctivae and EOM are normal. Pupils are equal, round, and reactive to light. Right eye exhibits no discharge. Left eye exhibits no discharge. No scleral icterus.   Cardiovascular: Normal rate, regular rhythm and normal heart sounds.  Exam reveals no gallop and no friction rub.    No murmur heard.  Pulmonary/Chest: Effort normal and breath sounds normal. No respiratory distress. He has no wheezes. He has no rales. He exhibits no tenderness.   Abdominal: Soft. Bowel sounds are normal. He exhibits no distension. There is no tenderness. There is no rebound.   Neurological: He is alert and oriented to person, place, and time.   CN 2-12 grossly intact. Motor 5/5 in all extremities   Skin: Skin is warm. He is not diaphoretic.       Old records were reviewed. Labs and/or images were independently reviewed.    ASSESSMENT     1. Ocular headache        PLAN:     Ocular headache  -     Sedimentation rate, manual; Future; Expected date: 12/13/2017  -     C-reactive protein; Future; Expected date: 12/13/2017  -     Ambulatory referral to Optometry  -     Will obtain inflammatory markers to ensure it is not GCA. Will refer to opto to r/o intraocular abnormalities. Consider treating as cluster headache if above work up is negative.       RTC KANDY Khan MD  12/13/2017 11:33 AM

## 2017-12-13 NOTE — PATIENT INSTRUCTIONS
No problem found to explain symptom of recurring/intermittent pain in the temporal brow area on the right side.  Trace nuclear sclerosis of lens of both eyes, consistent with age.  Good ocular health, otherwise.  Intraocular pressure within normal range.  Heathy optic nerve in each eye.  Excellent unaided distance visual acuity.    Okay to continue to use reading glasses for reading and close work.    Discussed with Mr. Faustin and his wife.  Suggest return to PCP for consideration of need for imaging (CT vs MRI) to help rule out occult etiology for symptoms.

## 2017-12-13 NOTE — PROGRESS NOTES
HPI     Patient is in stating he's been having intermittent pain in temporal brow   area on right side - not in the eye, itself.  Patient states it started   happening 11/25/2017 - it lasted a week and went away. Patient states the   pain recurred this past week and it's getting worse. The pain only last   about 10 seconds at a time and now it's happening about 15 times a day.     Diabetic  yesterday - NIDDM      Last A1C 7.5 approx two months ago.   + HBP - takes meds and states well-controlled.   (-) blurry vision  (-) flashes of light   (-) floaters       Last eye examination in July, 2017, by Dr. Eliel Contreras.  Rx in glasses changed at that time.  Has one pair of bifocal lenses and   one pair of reading glasses.  Rarely uses distance correction, as he is happy with unaided distance [facility]     Uses glasses for reading only.      Last edited by Lucian Hansen, OD on 12/13/2017  2:14 PM. (History)            Assessment /Plan     For exam results, see Encounter Report.    1. Eye pain, right     2. Nuclear sclerosis of both eyes     3. Type 2 diabetes mellitus without retinopathy     4. Screening for eye condition                    No problem found to explain symptom of recurring/intermittent pain in the temporal brow area on the right side.  Trace nuclear sclerosis of lens of both eyes, consistent with age.  Good ocular health, otherwise.  Intraocular pressure within normal range.  Heathy optic nerve in each eye.  Excellent unaided distance visual acuity.    Okay to continue to use reading glasses for reading and close work.    Discussed with Mr. Faustin and his wife.  Suggest return to PCP for consideration of need for imaging (CT vs MRI) to help rule out occult etiology for symptoms.

## 2017-12-18 ENCOUNTER — PATIENT MESSAGE (OUTPATIENT)
Dept: FAMILY MEDICINE | Facility: CLINIC | Age: 57
End: 2017-12-18

## 2017-12-18 DIAGNOSIS — R51.9 NONINTRACTABLE HEADACHE, UNSPECIFIED CHRONICITY PATTERN, UNSPECIFIED HEADACHE TYPE: Primary | ICD-10-CM

## 2017-12-20 ENCOUNTER — TELEPHONE (OUTPATIENT)
Dept: FAMILY MEDICINE | Facility: CLINIC | Age: 57
End: 2017-12-20

## 2018-01-25 ENCOUNTER — OFFICE VISIT (OUTPATIENT)
Dept: NEUROLOGY | Facility: CLINIC | Age: 58
End: 2018-01-25
Payer: COMMERCIAL

## 2018-01-25 VITALS
HEIGHT: 69 IN | DIASTOLIC BLOOD PRESSURE: 68 MMHG | HEART RATE: 105 BPM | BODY MASS INDEX: 29.33 KG/M2 | SYSTOLIC BLOOD PRESSURE: 119 MMHG | WEIGHT: 198 LBS

## 2018-01-25 DIAGNOSIS — G44.019 EPISODIC CLUSTER HEADACHE, NOT INTRACTABLE: Primary | ICD-10-CM

## 2018-01-25 PROCEDURE — 3008F BODY MASS INDEX DOCD: CPT | Mod: S$GLB,,, | Performed by: NEUROLOGICAL SURGERY

## 2018-01-25 PROCEDURE — 99204 OFFICE O/P NEW MOD 45 MIN: CPT | Mod: S$GLB,,, | Performed by: NEUROLOGICAL SURGERY

## 2018-01-25 PROCEDURE — 99999 PR PBB SHADOW E&M-EST. PATIENT-LVL III: CPT | Mod: PBBFAC,,, | Performed by: NEUROLOGICAL SURGERY

## 2018-01-25 NOTE — LETTER
January 31, 2018      Masood Khan MD  [address] [address]           [facility] [address] [facility] [address]  [facility] [address]  Phone: [phone]  Fax: [phone]          Patient: Stanley Faustin   MR Number: [id number]   YOB: 1960   Date of Visit: 1/25/2018       Dear Dr. Masood Khan:    Thank you for referring Stanley Faustin to me for evaluation. Attached you will find relevant portions of my assessment and plan of care.    If you have questions, please do not hesitate to call me. I look forward to following Stanley Faustin along with you.    Sincerely,    Eddy Woodruff MD    Enclosure  CC:  No Recipients    If you would like to receive this communication electronically, please contact [email or web address] or [phone] to request more information on [facility] access.    For providers and/or their staff who would like to refer a patient to [facility], please contact us through our one-stop-shop provider referral line, [facility], at [phone].    If you feel you have received this communication in error or would no longer like to receive these types of communications, please e-mail [email or web address]

## 2018-01-29 ENCOUNTER — HOSPITAL ENCOUNTER (OUTPATIENT)
Dept: RADIOLOGY | Facility: HOSPITAL | Age: 58
Discharge: HOME | End: 2018-01-29
Attending: NEUROLOGICAL SURGERY
Payer: COMMERCIAL

## 2018-01-29 DIAGNOSIS — G44.019 EPISODIC CLUSTER HEADACHE, NOT INTRACTABLE: ICD-10-CM

## 2018-01-29 PROCEDURE — 70498 CT ANGIOGRAPHY NECK: CPT | Mod: 26,,, | Performed by: RADIOLOGY

## 2018-01-29 PROCEDURE — 70496 CT ANGIOGRAPHY HEAD: CPT | Mod: 26,,, | Performed by: RADIOLOGY

## 2018-01-29 PROCEDURE — 25500020 PHARM REV CODE 255: Performed by: NEUROLOGICAL SURGERY

## 2018-01-29 PROCEDURE — 70496 CT ANGIOGRAPHY HEAD: CPT | Mod: TC

## 2018-01-29 RX ADMIN — IOHEXOL 75 ML: 350 INJECTION, SOLUTION INTRAVENOUS at 03:01

## 2018-01-29 NOTE — PROGRESS NOTES
Chief Complaint   Patient presents with    Headache        Stanley Faustin is a 57 y.o. male with a history of multiple medical diagnoses as listed below that presents for evaluation of headaches.  He says the headaches began several months ago.  The pain is described as a sharp sensation that happens mostly along the frontal portion of the head and the pain can get very intense very quickly reaches a 10 out of 10.  The pain lasts several seconds up to about a minute before subsides on its own.  There is no associated symptoms when he has the headache.  He denies any nausea, vomiting, tearing of the eye, or redness of the eye.  He has no warning prior to onset of these pains.  He has not had any pains like these in the past.  He has no family history of headaches.     PAST MEDICAL HISTORY:  Past Medical History:   Diagnosis Date    Coronary artery disease     s/p stent    Diabetes mellitus     Diabetes mellitus type II     Hyperlipidemia     Hypertension     Myocardial infarction        PAST SURGICAL HISTORY:  Past Surgical History:   Procedure Laterality Date    CORONARY ANGIOPLASTY WITH STENT PLACEMENT      TONSILLECTOMY      UVULOPALATOPHARYNGOPLASTY      for REJI       SOCIAL HISTORY:  Social History     Social History    Marital status:      Spouse name: N/A    Number of children: N/A    Years of education: N/A     Occupational History     [employer]     Social History Main Topics    Smoking status: Current Every Day Smoker     Packs/day: 0.50     Types: Cigarettes    Smokeless tobacco: Never Used      Comment:  x 34 yr.  Works at [email or web address].  Three kids.  Walks a lot at work.      Alcohol use Yes      Comment: only on occasion    Drug use: No    Sexual activity: Yes     Partners: Female     Other Topics Concern    Not on file     Social History Narrative    No narrative on file       FAMILY HISTORY:  Family History   Problem Relation Age of Onset    Diabetes  Mother     Diabetes Father     Heart disease Father     Mental illness Brother      suicide    Cancer Neg Hx      prostate or colon       ALLERGIES AND MEDICATIONS: updated and reviewed.  Review of patient's allergies indicates:  No Known Allergies  Current Outpatient Prescriptions   Medication Sig Dispense Refill    aspirin (ECOTRIN) 81 MG EC tablet Take 81 mg by mouth once daily.      blood sugar diagnostic Strp 1 strip by Misc.(Non-Drug; Combo Route) route 3 (three) times daily. 100 strip 5    blood-glucose meter kit Use as instructed 1 each 0    clotrimazole-betamethasone 1-0.05% (LOTRISONE) cream APPLY 1 TO 2 TIMES A DAY AS NEEDED 45 g 2    diclofenac sodium (VOLTAREN) 1 % Gel Apply 2 g topically 4 (four) times daily. 100 g 0    empagliflozin 10 mg Tab Take 10 mg by mouth once daily. 90 tablet 3    glyBURIDE-metformin 5-500 mg (GLUCOVANCE) 5-500 mg Tab Take 2 tablets by mouth 2 (two) times daily with meals. 180 tablet 3    lancets Misc 1 application by Misc.(Non-Drug; Combo Route) route 3 (three) times daily. 100 each 5    lisinopril (PRINIVIL,ZESTRIL) 5 MG tablet TAKE 1 TABLET BY MOUTH 2 (TWO ) TIMES DAILY 180 tablet 3    lovastatin (MEVACOR) 40 MG tablet TAKE 1 TABLET BY MOUTH AT BEDTIME FOR CHOLESTEROL CONTROL 90 tablet 3    nitroGLYCERIN (NITROSTAT) 0.4 MG SL tablet Place 0.4 mg under the tongue every 5 (five) minutes as needed.      SITagliptan (JANUVIA) 100 MG Tab TAKE 1 TABLET BY MOUTH ONE TIME DAILY 90 tablet 3     No current facility-administered medications for this visit.        Review of Systems   Constitutional: Negative for activity change, fatigue and unexpected weight change.   HENT: Negative for trouble swallowing and voice change.    Eyes: Negative for photophobia, pain and visual disturbance.   Respiratory: Negative for apnea and shortness of breath.    Cardiovascular: Negative for chest pain and palpitations.   Gastrointestinal: Negative for constipation, nausea and vomiting.    Genitourinary: Negative for difficulty urinating.   Musculoskeletal: Negative for arthralgias, back pain, gait problem, myalgias and neck pain.   Skin: Negative for color change and rash.   Neurological: Positive for headaches. Negative for dizziness, seizures, syncope, speech difficulty, weakness, light-headedness and numbness.   Psychiatric/Behavioral: Negative for agitation, behavioral problems and confusion.       Neurologic Exam     Mental Status   Oriented to person, place, and time.   Registration: recalls 3 of 3 objects.   Attention: normal. Concentration: normal.   Speech: speech is normal   Level of consciousness: alert  Knowledge: good.     Cranial Nerves     CN II   Visual fields full to confrontation.   Right visual field deficit: none  Left visual field deficit: none     CN III, IV, VI   Pupils are equal, round, and reactive to light.  Extraocular motions are normal.   Right pupil: Size: 3 mm. Shape: regular. Accommodation: intact.   Left pupil: Size: 3 mm. Shape: regular. Accommodation: intact.   CN III: no CN III palsy  CN VI: no CN VI palsy  Nystagmus: none   Diplopia: none  Ophthalmoparesis: none  Upgaze: normal  Downgaze: normal  Conjugate gaze: present    CN V   Facial sensation intact.   Right facial sensation deficit: none  Left facial sensation deficit: none    CN VII   Facial expression full, symmetric.   Right facial weakness: none  Left facial weakness: none    CN VIII   CN VIII normal.     CN IX, X   CN IX normal.   CN X normal.   Palate: symmetric    CN XI   CN XI normal.   Right sternocleidomastoid strength: normal  Left sternocleidomastoid strength: normal  Right trapezius strength: normal  Left trapezius strength: normal    CN XII   CN XII normal.   Tongue deviation: none    Motor Exam   Muscle bulk: normal  Overall muscle tone: normal  Right arm tone: normal  Left arm tone: normal  Right leg tone: normal  Left leg tone: normal    Strength   Strength 5/5 throughout.     Sensory Exam    Right arm light touch: normal  Left arm light touch: normal  Right leg light touch: normal  Left leg light touch: normal  Right arm vibration: normal  Left arm vibration: normal  Right leg vibration: normal  Left leg vibration: normal  Right arm proprioception: normal  Left arm proprioception: normal  Right leg proprioception: normal  Left leg proprioception: normal  Right arm pinprick: normal  Left arm pinprick: normal  Right leg pinprick: normal  Left leg pinprick: normal    Gait, Coordination, and Reflexes     Gait  Gait: normal    Coordination   Romberg: negative  Finger to nose coordination: normal  Heel to shin coordination: normal  Tandem walking coordination: normal    Tremor   Resting tremor: absent    Reflexes   Right brachioradialis: 2+  Left brachioradialis: 2+  Right biceps: 2+  Left biceps: 2+  Right triceps: 2+  Left triceps: 2+  Right patellar: 2+  Left patellar: 2+  Right achilles: 2+  Left achilles: 2+  Right plantar: normal  Left plantar: normal      Physical Exam   Constitutional: He is oriented to person, place, and time. He appears well-developed and well-nourished.   HENT:   Head: Normocephalic and atraumatic.   Eyes: EOM are normal. Pupils are equal, round, and reactive to light.   Cardiovascular: Intact distal pulses.    Pulmonary/Chest: Effort normal. No respiratory distress.   Musculoskeletal: Normal range of motion.   Neurological: He is alert and oriented to person, place, and time. He has normal strength. He has a normal Finger-Nose-Finger Test, a normal Heel to Shin Test, a normal Romberg Test and a normal Tandem Gait Test. Gait normal.   Reflex Scores:       Tricep reflexes are 2+ on the right side and 2+ on the left side.       Bicep reflexes are 2+ on the right side and 2+ on the left side.       Brachioradialis reflexes are 2+ on the right side and 2+ on the left side.       Patellar reflexes are 2+ on the right side and 2+ on the left side.       Achilles reflexes are 2+ on the  "right side and 2+ on the left side.  Skin: Skin is warm and dry.   Psychiatric: He has a normal mood and affect. His speech is normal and behavior is normal.       Vitals:    01/25/18 1259   BP: 119/68   BP Location: Left arm   Patient Position: Sitting   BP Method: Large (Automatic)   Pulse: 105   Weight: 89.8 kg (197 lb 15.6 oz)   Height: 5' 9" (1.753 m)       Assessment & Plan:    Problem List Items Addressed This Visit     Episodic cluster headache, not intractable - Primary    Relevant Orders    CT Head Without Contrast    CTA Head and Neck (xpd)    Creatinine, serum          Follow-up: Follow-up in about 2 months (around 3/25/2018).  More than 50% of this 45 minute encounter was spent in counseling and coordinating care.    "

## 2018-01-30 ENCOUNTER — OFFICE VISIT (OUTPATIENT)
Dept: NEUROLOGY | Facility: CLINIC | Age: 58
End: 2018-01-30
Payer: COMMERCIAL

## 2018-01-30 VITALS
HEIGHT: 69 IN | DIASTOLIC BLOOD PRESSURE: 74 MMHG | HEART RATE: 104 BPM | SYSTOLIC BLOOD PRESSURE: 127 MMHG | WEIGHT: 198 LBS | BODY MASS INDEX: 29.33 KG/M2

## 2018-01-30 DIAGNOSIS — G44.019 EPISODIC CLUSTER HEADACHE, NOT INTRACTABLE: Primary | ICD-10-CM

## 2018-01-30 PROCEDURE — 99214 OFFICE O/P EST MOD 30 MIN: CPT | Mod: S$GLB,,, | Performed by: NEUROLOGICAL SURGERY

## 2018-01-30 PROCEDURE — 3008F BODY MASS INDEX DOCD: CPT | Mod: S$GLB,,, | Performed by: NEUROLOGICAL SURGERY

## 2018-01-30 PROCEDURE — 99999 PR PBB SHADOW E&M-EST. PATIENT-LVL III: CPT | Mod: PBBFAC,,, | Performed by: NEUROLOGICAL SURGERY

## 2018-01-30 NOTE — PROGRESS NOTES
Chief Complaint   Patient presents with    Follow-up     CT results         Stanley Faustin is a 57 y.o. male with a history of multiple medical diagnoses as listed below that presents for follow-up of his CT results.  The patient says that he has not been having headaches as frequently as when he was last seen in clinic.  He has not needed to use any medication to abort the headaches as they have to disappear on their own.      PAST MEDICAL HISTORY:  Past Medical History:   Diagnosis Date    Coronary artery disease     s/p stent    Diabetes mellitus     Diabetes mellitus type II     Hyperlipidemia     Hypertension     Myocardial infarction        PAST SURGICAL HISTORY:  Past Surgical History:   Procedure Laterality Date    CORONARY ANGIOPLASTY WITH STENT PLACEMENT      TONSILLECTOMY      UVULOPALATOPHARYNGOPLASTY      for REJI       SOCIAL HISTORY:  Social History     Social History    Marital status:      Spouse name: N/A    Number of children: N/A    Years of education: N/A     Occupational History     [employer]     Social History Main Topics    Smoking status: Current Every Day Smoker     Packs/day: 0.50     Types: Cigarettes    Smokeless tobacco: Never Used      Comment:  x 34 yr.  Works at [employer].  Three kids.  Walks a lot at work.      Alcohol use Yes      Comment: only on occasion    Drug use: No    Sexual activity: Yes     Partners: Female     Other Topics Concern    Not on file     Social History Narrative    No narrative on file       FAMILY HISTORY:  Family History   Problem Relation Age of Onset    Diabetes Mother     Diabetes Father     Heart disease Father     Mental illness Brother      suicide    Cancer Neg Hx      prostate or colon       ALLERGIES AND MEDICATIONS: updated and reviewed.  Review of patient's allergies indicates:  No Known Allergies  Current Outpatient Prescriptions   Medication Sig Dispense Refill    aspirin (ECOTRIN) 81 MG  EC tablet Take 81 mg by mouth once daily.      blood sugar diagnostic Strp 1 strip by Misc.(Non-Drug; Combo Route) route 3 (three) times daily. 100 strip 5    blood-glucose meter kit Use as instructed 1 each 0    clotrimazole-betamethasone 1-0.05% (LOTRISONE) cream APPLY 1 TO 2 TIMES A DAY AS NEEDED 45 g 2    diclofenac sodium (VOLTAREN) 1 % Gel Apply 2 g topically 4 (four) times daily. 100 g 0    empagliflozin 10 mg Tab Take 10 mg by mouth once daily. 90 tablet 3    fluticasone (FLONASE) 50 mcg/actuation nasal spray 1 spray (50 mcg total) by Each Nare route once daily. 1 Bottle 5    glyBURIDE-metformin 5-500 mg (GLUCOVANCE) 5-500 mg Tab Take 2 tablets by mouth 2 (two) times daily with meals. 180 tablet 3    lancets Misc 1 application by Misc.(Non-Drug; Combo Route) route 3 (three) times daily. 100 each 5    lisinopril (PRINIVIL,ZESTRIL) 5 MG tablet Take 1 tablet (5 mg total) by mouth once daily. TAKE 1 TABLET BY MOUTH 2 (TWO ) TIMES DAILY 90 tablet 3    loratadine (CLARITIN) 10 mg tablet Take 1 tablet (10 mg total) by mouth daily as needed for Allergies (or runny nose). 30 tablet 0    lovastatin (MEVACOR) 40 MG tablet TAKE 1 TABLET BY MOUTH AT BEDTIME FOR CHOLESTEROL CONTROL 90 tablet 3    nitroGLYCERIN (NITROSTAT) 0.4 MG SL tablet Place 0.4 mg under the tongue every 5 (five) minutes as needed.      SITagliptan (JANUVIA) 100 MG Tab TAKE 1 TABLET BY MOUTH ONE TIME DAILY 90 tablet 3     No current facility-administered medications for this visit.        Review of Systems   Constitutional: Negative for activity change, fatigue and unexpected weight change.   HENT: Negative for trouble swallowing and voice change.    Eyes: Negative for photophobia, pain and visual disturbance.   Respiratory: Negative for apnea and shortness of breath.    Cardiovascular: Negative for chest pain and palpitations.   Gastrointestinal: Negative for constipation, nausea and vomiting.   Genitourinary: Negative for difficulty  urinating.   Musculoskeletal: Negative for arthralgias, back pain, gait problem, myalgias and neck pain.   Skin: Negative for color change and rash.   Neurological: Negative for dizziness, seizures, syncope, speech difficulty, weakness, light-headedness, numbness and headaches.   Psychiatric/Behavioral: Negative for agitation, behavioral problems and confusion.       Neurologic Exam     Mental Status   Oriented to person, place, and time.   Registration: recalls 3 of 3 objects.   Attention: normal. Concentration: normal.   Speech: speech is normal   Level of consciousness: alert  Knowledge: good.     Cranial Nerves     CN II   Visual fields full to confrontation.   Right visual field deficit: none  Left visual field deficit: none     CN III, IV, VI   Pupils are equal, round, and reactive to light.  Extraocular motions are normal.   Right pupil: Size: 3 mm. Shape: regular. Accommodation: intact.   Left pupil: Size: 3 mm. Shape: regular. Accommodation: intact.   CN III: no CN III palsy  CN VI: no CN VI palsy  Nystagmus: none   Diplopia: none  Ophthalmoparesis: none  Upgaze: normal  Downgaze: normal  Conjugate gaze: present    CN V   Facial sensation intact.   Right facial sensation deficit: none  Left facial sensation deficit: none    CN VII   Facial expression full, symmetric.   Right facial weakness: none  Left facial weakness: none    CN VIII   CN VIII normal.     CN IX, X   CN IX normal.   CN X normal.   Palate: symmetric    CN XI   CN XI normal.   Right sternocleidomastoid strength: normal  Left sternocleidomastoid strength: normal  Right trapezius strength: normal  Left trapezius strength: normal    CN XII   CN XII normal.   Tongue deviation: none    Motor Exam   Muscle bulk: normal  Overall muscle tone: normal  Right arm tone: normal  Left arm tone: normal  Right leg tone: normal  Left leg tone: normal    Strength   Strength 5/5 throughout.     Sensory Exam   Right arm light touch: normal  Left arm light touch:  normal  Right leg light touch: normal  Left leg light touch: normal  Right arm vibration: normal  Left arm vibration: normal  Right leg vibration: normal  Left leg vibration: normal  Right arm proprioception: normal  Left arm proprioception: normal  Right leg proprioception: normal  Left leg proprioception: normal  Right arm pinprick: normal  Left arm pinprick: normal  Right leg pinprick: normal  Left leg pinprick: normal    Gait, Coordination, and Reflexes     Gait  Gait: normal    Coordination   Romberg: negative  Finger to nose coordination: normal  Heel to shin coordination: normal  Tandem walking coordination: normal    Tremor   Resting tremor: absent    Reflexes   Right brachioradialis: 2+  Left brachioradialis: 2+  Right biceps: 2+  Left biceps: 2+  Right triceps: 2+  Left triceps: 2+  Right patellar: 2+  Left patellar: 2+  Right achilles: 2+  Left achilles: 2+  Right plantar: normal  Left plantar: normal      Physical Exam   Constitutional: He is oriented to person, place, and time. He appears well-developed and well-nourished.   HENT:   Head: Normocephalic and atraumatic.   Eyes: EOM are normal. Pupils are equal, round, and reactive to light.   Cardiovascular: Intact distal pulses.    Pulmonary/Chest: Effort normal. No respiratory distress.   Musculoskeletal: Normal range of motion.   Neurological: He is alert and oriented to person, place, and time. He has normal strength. He has a normal Finger-Nose-Finger Test, a normal Heel to Shin Test, a normal Romberg Test and a normal Tandem Gait Test. Gait normal.   Reflex Scores:       Tricep reflexes are 2+ on the right side and 2+ on the left side.       Bicep reflexes are 2+ on the right side and 2+ on the left side.       Brachioradialis reflexes are 2+ on the right side and 2+ on the left side.       Patellar reflexes are 2+ on the right side and 2+ on the left side.       Achilles reflexes are 2+ on the right side and 2+ on the left side.  Skin: Skin is warm  "and dry.   Psychiatric: He has a normal mood and affect. His speech is normal and behavior is normal.       Vitals:    01/30/18 1048   BP: 127/74   BP Location: Left arm   Patient Position: Sitting   BP Method: Large (Automatic)   Pulse: 104   Weight: 89.8 kg (197 lb 15.6 oz)   Height: 5' 9" (1.753 m)     CTA personally review: No acute changes    Assessment & Plan:    Problem List Items Addressed This Visit     Episodic cluster headache, not intractable - Primary          Follow-up: Follow-up in about 6 months (around 7/30/2018).  More than 50% of this 25 minute encounter was spent in counseling and coordinating care.    "

## 2018-02-05 ENCOUNTER — OFFICE VISIT (OUTPATIENT)
Dept: FAMILY MEDICINE | Facility: CLINIC | Age: 58
End: 2018-02-05
Payer: COMMERCIAL

## 2018-02-05 VITALS
BODY MASS INDEX: 29.5 KG/M2 | OXYGEN SATURATION: 99 % | TEMPERATURE: 98 F | HEIGHT: 69 IN | DIASTOLIC BLOOD PRESSURE: 68 MMHG | HEART RATE: 102 BPM | SYSTOLIC BLOOD PRESSURE: 98 MMHG | WEIGHT: 199.19 LBS

## 2018-02-05 DIAGNOSIS — M25.561 RIGHT KNEE PAIN, UNSPECIFIED CHRONICITY: ICD-10-CM

## 2018-02-05 DIAGNOSIS — E11.9 TYPE 2 DIABETES MELLITUS WITHOUT COMPLICATION, WITH LONG-TERM CURRENT USE OF INSULIN: Primary | ICD-10-CM

## 2018-02-05 DIAGNOSIS — Z79.4 TYPE 2 DIABETES MELLITUS WITHOUT COMPLICATION, WITH LONG-TERM CURRENT USE OF INSULIN: Primary | ICD-10-CM

## 2018-02-05 DIAGNOSIS — I15.2 HYPERTENSION ASSOCIATED WITH DIABETES: ICD-10-CM

## 2018-02-05 DIAGNOSIS — J31.0 RHINITIS, UNSPECIFIED CHRONICITY, UNSPECIFIED TYPE: ICD-10-CM

## 2018-02-05 DIAGNOSIS — E11.59 HYPERTENSION ASSOCIATED WITH DIABETES: ICD-10-CM

## 2018-02-05 PROCEDURE — 99999 PR PBB SHADOW E&M-EST. PATIENT-LVL III: CPT | Mod: PBBFAC,,, | Performed by: FAMILY MEDICINE

## 2018-02-05 PROCEDURE — 3008F BODY MASS INDEX DOCD: CPT | Mod: S$GLB,,, | Performed by: FAMILY MEDICINE

## 2018-02-05 PROCEDURE — 99214 OFFICE O/P EST MOD 30 MIN: CPT | Mod: S$GLB,,, | Performed by: FAMILY MEDICINE

## 2018-02-05 RX ORDER — FLUTICASONE PROPIONATE 50 MCG
1 SPRAY, SUSPENSION (ML) NASAL DAILY
Qty: 1 BOTTLE | Refills: 5 | Status: SHIPPED | OUTPATIENT
Start: 2018-02-05 | End: 2018-06-11

## 2018-02-05 RX ORDER — LISINOPRIL 5 MG/1
5 TABLET ORAL DAILY
Qty: 90 TABLET | Refills: 3
Start: 2018-02-05 | End: 2019-06-07 | Stop reason: SDUPTHER

## 2018-02-05 RX ORDER — LORATADINE 10 MG/1
10 TABLET ORAL DAILY PRN
Qty: 30 TABLET | Refills: 0 | Status: SHIPPED | OUTPATIENT
Start: 2018-02-05 | End: 2018-03-06

## 2018-02-05 NOTE — LETTER
February 5, 2018      Stanley Faustin   [address] [place] [address]  [place] [id number]             [address] [place] [address]  Phone: [phone]  Fax: [phone] Stanley Faustin    Was treated here on 02/05/2018. Patient diagnosed with right knee osteoarthritis. Will try therapy with topical NSAIDs, knee brace, and ICE. Will re-visit in 2 weeks if needed.    May Return to work/school on 2/9/18    No Restrictions                Masood Khan MD

## 2018-02-05 NOTE — PROGRESS NOTES
[facility]  Progress Note    SUBJECTIVE:     Chief Complaint   Patient presents with    Sinus Problem    Knee Pain       HPI   Stanley Faustin  is a 57 y.o. male here for c/o having runny nose for the past week. Hasn't tried anything for it. No fevers, chills, cough, just mild congestion. He also has c/o right knee pain. The knee pain is somewhat better, which initially was 10/10. Today it is 5/10. He has been wearing knee brace with some relief. Certain positions make it worst. Patient has no other new complaints/problems at this time.      Review of patient's allergies indicates:  No Known Allergies    Past Medical History:   Diagnosis Date    Coronary artery disease     s/p stent    Diabetes mellitus     Diabetes mellitus type II     Hyperlipidemia     Hypertension     Myocardial infarction      Past Surgical History:   Procedure Laterality Date    CORONARY ANGIOPLASTY WITH STENT PLACEMENT      TONSILLECTOMY      UVULOPALATOPHARYNGOPLASTY      for REJI     Family History   Problem Relation Age of Onset    Diabetes Mother     Diabetes Father     Heart disease Father     Mental illness Brother      suicide    Cancer Neg Hx      prostate or colon     Social History   Substance Use Topics    Smoking status: Current Every Day Smoker     Packs/day: 0.50     Types: Cigarettes    Smokeless tobacco: Never Used      Comment:  x 34 yr.  Works at [employer].  Three kids.  Walks a lot at work.      Alcohol use Yes      Comment: only on occasion        Review of Systems   Constitutional: Negative for chills, fever and malaise/fatigue.   HENT: Negative.    Respiratory: Negative.  Negative for cough and shortness of breath.    Cardiovascular: Negative.  Negative for chest pain.   Gastrointestinal: Negative.  Negative for abdominal pain, nausea and vomiting.   Genitourinary: Negative.    Musculoskeletal: Positive for joint pain (R knee). Back pain: knee pain.   Neurological:  Negative for weakness and headaches.   All other systems reviewed and are negative.    OBJECTIVE:     Vitals:    02/05/18 1303   BP: 98/68   Pulse: 102   Temp: 98.4 °F (36.9 °C)     Body mass index is 29.41 kg/m².    Physical Exam   Constitutional: He is oriented to person, place, and time and well-developed, well-nourished, and in no distress. No distress.   HENT:   Head: Normocephalic and atraumatic.   Nose: Nose normal.   Eyes: Conjunctivae and EOM are normal.   Cardiovascular: Normal rate, regular rhythm and normal heart sounds.  Exam reveals no gallop and no friction rub.    No murmur heard.  Pulmonary/Chest: Effort normal and breath sounds normal. No respiratory distress. He has no wheezes. He has no rales. He exhibits no tenderness.   Abdominal: Soft. Bowel sounds are normal. He exhibits no distension. There is no tenderness. There is no rebound.   Musculoskeletal: He exhibits tenderness (to palpation of R medial tibial-femoral compartment, decreased joint space. ACL/PCL intact, negative McMurrays sign).   Neurological: He is alert and oriented to person, place, and time.   Skin: Skin is warm. No rash noted. He is not diaphoretic. No erythema.       Old records were reviewed. Labs and/or images were independently reviewed.    ASSESSMENT     1. Type 2 diabetes mellitus without complication, with long-term current use of insulin    2. Rhinitis, unspecified chronicity, unspecified type    3. Hypertension associated with diabetes    4. Right knee pain, unspecified chronicity        PLAN:     Type 2 diabetes mellitus without complication, with long-term current use of insulin  -     lisinopril (PRINIVIL,ZESTRIL) 5 MG tablet; Take 1 tablet (5 mg total) by mouth once daily. TAKE 1 TABLET BY MOUTH 2 (TWO ) TIMES DAILY  Dispense: 90 tablet; Refill: 3  -     Stable. Continue current regimen.    Rhinitis, unspecified chronicity, unspecified type  -     loratadine (CLARITIN) 10 mg tablet; Take 1 tablet (10 mg total) by  mouth daily as needed for Allergies (or runny nose).  Dispense: 30 tablet; Refill: 0  -     fluticasone (FLONASE) 50 mcg/actuation nasal spray; 1 spray (50 mcg total) by Each Nare route once daily.  Dispense: 1 Bottle; Refill: 5  -     Try claritin/flonase combo first. Recommend nasal saline rinses.     Hypertension associated with diabetes   -     Stable. Continue current regimen.    R knee pain   -     Will continue with conservative management. NSAIDs, voltaren gel, ice, and knee brace. If unresolved, consider knee injection on next visit.    RTC PRJOSE E Khan MD  02/05/2018 3:37 PM

## 2018-03-06 ENCOUNTER — PATIENT MESSAGE (OUTPATIENT)
Dept: FAMILY MEDICINE | Facility: CLINIC | Age: 58
End: 2018-03-06

## 2018-03-06 RX ORDER — CETIRIZINE HYDROCHLORIDE 10 MG/1
10 TABLET ORAL DAILY PRN
Qty: 30 TABLET | Refills: 1 | Status: SHIPPED | OUTPATIENT
Start: 2018-03-06 | End: 2018-06-11

## 2018-03-06 RX ORDER — IPRATROPIUM BROMIDE 21 UG/1
2 SPRAY, METERED NASAL 2 TIMES DAILY
Qty: 30 ML | Refills: 1 | Status: SHIPPED | OUTPATIENT
Start: 2018-03-06 | End: 2018-06-11

## 2018-03-18 ENCOUNTER — PATIENT MESSAGE (OUTPATIENT)
Dept: FAMILY MEDICINE | Facility: CLINIC | Age: 58
End: 2018-03-18

## 2018-03-18 DIAGNOSIS — J34.89 SINUS PRESSURE: Primary | ICD-10-CM

## 2018-04-11 DIAGNOSIS — J34.89 NASAL OBSTRUCTION: ICD-10-CM

## 2018-04-11 DIAGNOSIS — J32.0 CHRONIC MAXILLARY SINUSITIS: Primary | ICD-10-CM

## 2018-04-11 DIAGNOSIS — R51.9 FACIAL PAIN: ICD-10-CM

## 2018-04-18 ENCOUNTER — HOSPITAL ENCOUNTER (OUTPATIENT)
Dept: RADIOLOGY | Facility: HOSPITAL | Age: 58
Discharge: HOME | End: 2018-04-18
Attending: OTOLARYNGOLOGY
Payer: COMMERCIAL

## 2018-04-18 DIAGNOSIS — J34.89 NASAL OBSTRUCTION: ICD-10-CM

## 2018-04-18 DIAGNOSIS — J32.0 CHRONIC MAXILLARY SINUSITIS: ICD-10-CM

## 2018-04-18 DIAGNOSIS — R51.9 FACIAL PAIN: ICD-10-CM

## 2018-04-18 PROCEDURE — 70486 CT MAXILLOFACIAL W/O DYE: CPT | Mod: 26,,, | Performed by: RADIOLOGY

## 2018-04-18 PROCEDURE — 70486 CT MAXILLOFACIAL W/O DYE: CPT | Mod: TC

## 2018-04-20 DIAGNOSIS — E11.9 TYPE 2 DIABETES MELLITUS WITHOUT COMPLICATION: ICD-10-CM

## 2018-05-23 ENCOUNTER — PATIENT MESSAGE (OUTPATIENT)
Dept: FAMILY MEDICINE | Facility: CLINIC | Age: 58
End: 2018-05-23

## 2018-05-23 DIAGNOSIS — E11.9 DIABETES MELLITUS WITHOUT COMPLICATION: ICD-10-CM

## 2018-05-30 DIAGNOSIS — E11.9 DIABETES MELLITUS WITHOUT COMPLICATION: ICD-10-CM

## 2018-06-11 ENCOUNTER — OFFICE VISIT (OUTPATIENT)
Dept: FAMILY MEDICINE | Facility: CLINIC | Age: 58
End: 2018-06-11
Payer: COMMERCIAL

## 2018-06-11 ENCOUNTER — HOSPITAL ENCOUNTER (OUTPATIENT)
Dept: RADIOLOGY | Facility: HOSPITAL | Age: 58
Discharge: HOME | End: 2018-06-11
Attending: FAMILY MEDICINE
Payer: COMMERCIAL

## 2018-06-11 VITALS
WEIGHT: 200.69 LBS | OXYGEN SATURATION: 95 % | TEMPERATURE: 98 F | DIASTOLIC BLOOD PRESSURE: 80 MMHG | HEIGHT: 69 IN | HEART RATE: 90 BPM | BODY MASS INDEX: 29.72 KG/M2 | SYSTOLIC BLOOD PRESSURE: 120 MMHG

## 2018-06-11 DIAGNOSIS — Z01.818 PRE-OP EVALUATION: Primary | ICD-10-CM

## 2018-06-11 DIAGNOSIS — Z01.818 PRE-OP EVALUATION: ICD-10-CM

## 2018-06-11 DIAGNOSIS — E11.9 TYPE 2 DIABETES MELLITUS WITHOUT COMPLICATION, WITH LONG-TERM CURRENT USE OF INSULIN: ICD-10-CM

## 2018-06-11 DIAGNOSIS — E11.59 HYPERTENSION ASSOCIATED WITH DIABETES: ICD-10-CM

## 2018-06-11 DIAGNOSIS — Z79.4 TYPE 2 DIABETES MELLITUS WITHOUT COMPLICATION, WITH LONG-TERM CURRENT USE OF INSULIN: ICD-10-CM

## 2018-06-11 DIAGNOSIS — I15.2 HYPERTENSION ASSOCIATED WITH DIABETES: ICD-10-CM

## 2018-06-11 PROCEDURE — 99214 OFFICE O/P EST MOD 30 MIN: CPT | Mod: S$GLB,,, | Performed by: FAMILY MEDICINE

## 2018-06-11 PROCEDURE — 99999 PR PBB SHADOW E&M-EST. PATIENT-LVL III: CPT | Mod: PBBFAC,,, | Performed by: FAMILY MEDICINE

## 2018-06-11 PROCEDURE — 71046 X-RAY EXAM CHEST 2 VIEWS: CPT | Mod: 26,,, | Performed by: RADIOLOGY

## 2018-06-11 PROCEDURE — 71046 X-RAY EXAM CHEST 2 VIEWS: CPT | Mod: TC,FY,PO

## 2018-06-11 RX ORDER — DOXYCYCLINE HYCLATE 100 MG
TABLET ORAL
COMMUNITY
Start: 2018-04-04 | End: 2018-06-11 | Stop reason: ALTCHOICE

## 2018-06-11 NOTE — PROGRESS NOTES
[facility]  Progress Note    SUBJECTIVE:     Chief Complaint   Patient presents with    Pre-op Exam       HPI   Stanley Faustin  is a 57 y.o. male here for pre-op evaluation for sinus surgery on 6/26/18, with Dr. Cortez. Doing well otherwise and has no new complaints/problems at this time.    Review of patient's allergies indicates:  No Known Allergies    Past Medical History:   Diagnosis Date    Coronary artery disease     s/p stent    Diabetes mellitus     Diabetes mellitus type II     Hyperlipidemia     Hypertension     Myocardial infarction      Past Surgical History:   Procedure Laterality Date    CORONARY ANGIOPLASTY WITH STENT PLACEMENT      TONSILLECTOMY      UVULOPALATOPHARYNGOPLASTY      for REJI     Family History   Problem Relation Age of Onset    Diabetes Mother     Diabetes Father     Heart disease Father     Mental illness Brother         suicide    Cancer Neg Hx         prostate or colon     Social History   Substance Use Topics    Smoking status: Current Every Day Smoker     Packs/day: 0.50     Types: Cigarettes    Smokeless tobacco: Never Used      Comment:  x 34 yr.  Works at [employer].  Three kids.  Walks a lot at work.      Alcohol use Yes      Comment: only on occasion        Review of Systems   Constitutional: Negative for chills, fever and malaise/fatigue.   HENT: Negative.    Respiratory: Negative.  Negative for cough and shortness of breath.    Cardiovascular: Negative.  Negative for chest pain.   Gastrointestinal: Negative.  Negative for abdominal pain, nausea and vomiting.   Genitourinary: Negative.    Neurological: Negative for weakness and headaches.   All other systems reviewed and are negative.    OBJECTIVE:     Vitals:    06/11/18 1302   BP: 120/80   Pulse: 90   Temp: 98.2 °F (36.8 °C)     Body mass index is 29.64 kg/m².    Physical Exam   Constitutional: He is oriented to person, place, and time and well-developed, well-nourished, and  in no distress. No distress.   HENT:   Head: Normocephalic and atraumatic.   Right Ear: Tympanic membrane is not perforated, not erythematous and not bulging. No hemotympanum.   Left Ear: Tympanic membrane is not perforated, not erythematous and not bulging. No hemotympanum.   Mouth/Throat: Oropharynx is clear and moist. No oropharyngeal exudate.   Eyes: Conjunctivae and EOM are normal. Pupils are equal, round, and reactive to light.   Neck: No thyromegaly present.   Cardiovascular: Normal rate, regular rhythm and normal heart sounds.  Exam reveals no gallop and no friction rub.    No murmur heard.  Pulmonary/Chest: Effort normal and breath sounds normal. No respiratory distress. He has no wheezes. He has no rales.   Abdominal: Soft. Bowel sounds are normal. He exhibits no distension. There is no tenderness. There is no rebound and no guarding.   Musculoskeletal: Normal range of motion. He exhibits no edema or tenderness.   Lymphadenopathy:     He has no cervical adenopathy.   Neurological: He is alert and oriented to person, place, and time.   Skin: Skin is warm. No rash noted. He is not diaphoretic. No erythema.       Old records were reviewed. Labs and/or images were independently reviewed.    ASSESSMENT     1. Pre-op evaluation    2. Hypertension associated with diabetes    3. Type 2 diabetes mellitus without complication, with long-term current use of insulin        PLAN:     Pre-op evaluation  -     CBC auto differential; Future  -     Comprehensive metabolic panel; Future  -     Protime-INR; Future; Expected date: 06/11/2018  -     APTT; Future; Expected date: 06/11/2018  -     Urinalysis; Future  -     EKG 12-lead    Hypertension associated with diabetes   -     Stable. Continue current regimen.    Type 2 diabetes mellitus without complication, with long-term current use of insulin  -     Hemoglobin A1c; Future  -     Lipid panel; Future  -     Stable. Continue current regimen.      DETSKY SCORE  Risk  Factors: Patient   1. Age older than 70 years: 5 points  2. Prior Myocardial Infarction   1. Last infarction within 6 months: 10 points  2. Last infarction more than 6 months ago: 5 points  3. Unstable Angina within last 6 months: 10 points  4. Angina Pectoris   1. [place] Angina Class 3: 10 points  2. [place] Angina Class 4: 20 points  5. Alveolar pulmonary edema   1. Pulmonary edema within one week: 10 points  2. Pulmonary edema at any time: 5 points  6. Suspected critical Aortic Stenosis: 20 points  7. Arrhythmia   1. Rhythm other than sinus or sinus with PACs: 5 points  2. More than five premature ventricular beats: 5 points  8. Emergency surgery: 10 points  9. Poor general medical status: 5 points   1. Based on [place] Risk Index    Interpretation   1. Class 1: Points 0-15 (Low risk)  2. Class 2: Points 20-30 (Moderate risk)  3. Class 3: Points >30 (High risk)     [place] Class 1, which translates into low risk for a low risk procedure. Please proceed with caution. Advised no ASA/NSAIDs 2 weeks prior to surgery.     RTC KANDY Khan MD  06/11/2018 1:16 PM

## 2018-06-20 ENCOUNTER — HOSPITAL ENCOUNTER (OUTPATIENT)
Dept: PREADMISSION TESTING | Facility: HOSPITAL | Age: 58
Discharge: HOME | End: 2018-06-20
Attending: OTOLARYNGOLOGY
Payer: COMMERCIAL

## 2018-06-20 VITALS
RESPIRATION RATE: 18 BRPM | OXYGEN SATURATION: 98 % | BODY MASS INDEX: 29.77 KG/M2 | DIASTOLIC BLOOD PRESSURE: 71 MMHG | SYSTOLIC BLOOD PRESSURE: 105 MMHG | TEMPERATURE: 97 F | HEIGHT: 69 IN | WEIGHT: 201 LBS | HEART RATE: 94 BPM

## 2018-06-20 NOTE — DISCHARGE INSTRUCTIONS
Your surgery is scheduled for___6/26/2018______________.    Call [phone] between 2 pm and 5 pm ____6/25/2018________ to find out your arrival time for the day of surgery.    Report to SAME DAY SURGERY UNIT at _______am on the 2nd floor of the hospital.  Use the front entrance of the hospital before 6 am.  If you need wheelchair assistance, call [phone] from your cell phone,  or call 0 from the courtesy phone in the hospital lobby.    Important instructions:   Do not eat or drink after 12 midnight, including water.  It is okay to brush your teeth.  Do not have gum, candy or mints.     Take only these medications with a small swallow of water on the morning of your surgery.____none_________    Do not take any diabetic medication on the morning of surgery unless instructed to do so by your doctor or pre op nurse.    Stop taking Aspirin, Ibuprofen, Motrin and Aleve , Fish oil, and Vitamin E for at least 7 days before your surgery. You may use Tylenol unless otherwise instructed by your doctor.           Please shower the night before and the morning of your surgery.         You may wear deodorant only.      Do not wear powder, body lotion or cologne.     Do not wear any jewelry or have any metal on your body.     Please bring any documents given to you by your doctor.     If you are going home on the same day of surgery, you must have arrangements for a ride home.  You will not be able to drive home if you were given anesthesia or sedation.     Wear loose fitting clothes allowing for bandages.     Please leave money and valuables home.       You may bring your cell phone.     Call the doctor if fever or illness should occur before your surgery.    Call [phone] to contact us here at [facility] if needed.

## 2018-06-26 ENCOUNTER — ANESTHESIA (OUTPATIENT)
Dept: SURGERY | Facility: HOSPITAL | Age: 58
End: 2018-06-26
Payer: COMMERCIAL

## 2018-06-26 ENCOUNTER — HOSPITAL ENCOUNTER (OUTPATIENT)
Facility: HOSPITAL | Age: 58
Discharge: HOME | End: 2018-06-26
Attending: OTOLARYNGOLOGY | Admitting: OTOLARYNGOLOGY
Payer: COMMERCIAL

## 2018-06-26 ENCOUNTER — ANESTHESIA EVENT (OUTPATIENT)
Dept: SURGERY | Facility: HOSPITAL | Age: 58
End: 2018-06-26
Payer: COMMERCIAL

## 2018-06-26 VITALS
SYSTOLIC BLOOD PRESSURE: 114 MMHG | BODY MASS INDEX: 29.74 KG/M2 | HEART RATE: 68 BPM | HEIGHT: 69 IN | TEMPERATURE: 98 F | WEIGHT: 200.81 LBS | DIASTOLIC BLOOD PRESSURE: 68 MMHG | RESPIRATION RATE: 19 BRPM | OXYGEN SATURATION: 94 %

## 2018-06-26 DIAGNOSIS — J34.89 NOSE OBSTRUCTION: ICD-10-CM

## 2018-06-26 PROCEDURE — 88304 TISSUE EXAM BY PATHOLOGIST: CPT | Mod: 26,,, | Performed by: PATHOLOGY

## 2018-06-26 PROCEDURE — 36000711: Performed by: OTOLARYNGOLOGY

## 2018-06-26 PROCEDURE — 25000003 PHARM REV CODE 250: Performed by: NURSE ANESTHETIST, CERTIFIED REGISTERED

## 2018-06-26 PROCEDURE — D9220A PRA ANESTHESIA: Mod: CRNA,,, | Performed by: NURSE ANESTHETIST, CERTIFIED REGISTERED

## 2018-06-26 PROCEDURE — 63600175 PHARM REV CODE 636 W HCPCS: Performed by: NURSE ANESTHETIST, CERTIFIED REGISTERED

## 2018-06-26 PROCEDURE — 25000003 PHARM REV CODE 250: Performed by: OTOLARYNGOLOGY

## 2018-06-26 PROCEDURE — 25000003 PHARM REV CODE 250: Performed by: ANESTHESIOLOGY

## 2018-06-26 PROCEDURE — 37000009 HC ANESTHESIA EA ADD 15 MINS: Performed by: OTOLARYNGOLOGY

## 2018-06-26 PROCEDURE — 37000008 HC ANESTHESIA 1ST 15 MINUTES: Performed by: OTOLARYNGOLOGY

## 2018-06-26 PROCEDURE — 71000016 HC POSTOP RECOV ADDL HR: Performed by: OTOLARYNGOLOGY

## 2018-06-26 PROCEDURE — 36000710: Performed by: OTOLARYNGOLOGY

## 2018-06-26 PROCEDURE — 88305 TISSUE EXAM BY PATHOLOGIST: CPT | Performed by: PATHOLOGY

## 2018-06-26 PROCEDURE — 71000033 HC RECOVERY, INTIAL HOUR: Performed by: OTOLARYNGOLOGY

## 2018-06-26 PROCEDURE — 71000015 HC POSTOP RECOV 1ST HR: Performed by: OTOLARYNGOLOGY

## 2018-06-26 PROCEDURE — D9220A PRA ANESTHESIA: Mod: ANES,,, | Performed by: ANESTHESIOLOGY

## 2018-06-26 PROCEDURE — 63600175 PHARM REV CODE 636 W HCPCS: Performed by: OTOLARYNGOLOGY

## 2018-06-26 PROCEDURE — 82962 GLUCOSE BLOOD TEST: CPT | Performed by: OTOLARYNGOLOGY

## 2018-06-26 PROCEDURE — 88305 TISSUE EXAM BY PATHOLOGIST: CPT | Mod: 26,,, | Performed by: PATHOLOGY

## 2018-06-26 PROCEDURE — 27201423 OPTIME MED/SURG SUP & DEVICES STERILE SUPPLY: Performed by: OTOLARYNGOLOGY

## 2018-06-26 RX ORDER — ONDANSETRON 2 MG/ML
INJECTION INTRAMUSCULAR; INTRAVENOUS
Status: DISCONTINUED | OUTPATIENT
Start: 2018-06-26 | End: 2018-06-26

## 2018-06-26 RX ORDER — OXYCODONE AND ACETAMINOPHEN 5; 325 MG/1; MG/1
1 TABLET ORAL
Status: DISCONTINUED | OUTPATIENT
Start: 2018-06-26 | End: 2018-06-26 | Stop reason: HOSPADM

## 2018-06-26 RX ORDER — GLYCOPYRROLATE 0.2 MG/ML
INJECTION INTRAMUSCULAR; INTRAVENOUS
Status: DISCONTINUED | OUTPATIENT
Start: 2018-06-26 | End: 2018-06-26

## 2018-06-26 RX ORDER — ROCURONIUM BROMIDE 10 MG/ML
INJECTION, SOLUTION INTRAVENOUS
Status: DISCONTINUED | OUTPATIENT
Start: 2018-06-26 | End: 2018-06-26

## 2018-06-26 RX ORDER — SODIUM CHLORIDE, SODIUM LACTATE, POTASSIUM CHLORIDE, CALCIUM CHLORIDE 600; 310; 30; 20 MG/100ML; MG/100ML; MG/100ML; MG/100ML
INJECTION, SOLUTION INTRAVENOUS CONTINUOUS
Status: DISCONTINUED | OUTPATIENT
Start: 2018-06-26 | End: 2018-06-26 | Stop reason: HOSPADM

## 2018-06-26 RX ORDER — ONDANSETRON 2 MG/ML
4 INJECTION INTRAMUSCULAR; INTRAVENOUS DAILY PRN
Status: DISCONTINUED | OUTPATIENT
Start: 2018-06-26 | End: 2018-06-26 | Stop reason: HOSPADM

## 2018-06-26 RX ORDER — PROPOFOL 10 MG/ML
VIAL (ML) INTRAVENOUS
Status: DISCONTINUED | OUTPATIENT
Start: 2018-06-26 | End: 2018-06-26

## 2018-06-26 RX ORDER — LIDOCAINE HYDROCHLORIDE 10 MG/ML
1 INJECTION, SOLUTION EPIDURAL; INFILTRATION; INTRACAUDAL; PERINEURAL ONCE
Status: DISCONTINUED | OUTPATIENT
Start: 2018-06-26 | End: 2018-06-26 | Stop reason: HOSPADM

## 2018-06-26 RX ORDER — PHENYLEPHRINE HYDROCHLORIDE 10 MG/ML
INJECTION INTRAVENOUS
Status: DISCONTINUED | OUTPATIENT
Start: 2018-06-26 | End: 2018-06-26

## 2018-06-26 RX ORDER — METOCLOPRAMIDE HYDROCHLORIDE 5 MG/ML
10 INJECTION INTRAMUSCULAR; INTRAVENOUS EVERY 10 MIN PRN
Status: DISCONTINUED | OUTPATIENT
Start: 2018-06-26 | End: 2018-06-26 | Stop reason: HOSPADM

## 2018-06-26 RX ORDER — MEPERIDINE HYDROCHLORIDE 50 MG/ML
12.5 INJECTION INTRAMUSCULAR; INTRAVENOUS; SUBCUTANEOUS ONCE AS NEEDED
Status: DISCONTINUED | OUTPATIENT
Start: 2018-06-26 | End: 2018-06-26 | Stop reason: HOSPADM

## 2018-06-26 RX ORDER — SODIUM CHLORIDE 0.9 % (FLUSH) 0.9 %
3 SYRINGE (ML) INJECTION
Status: DISCONTINUED | OUTPATIENT
Start: 2018-06-26 | End: 2018-06-26 | Stop reason: HOSPADM

## 2018-06-26 RX ORDER — MIDAZOLAM HYDROCHLORIDE 1 MG/ML
INJECTION, SOLUTION INTRAMUSCULAR; INTRAVENOUS
Status: DISCONTINUED | OUTPATIENT
Start: 2018-06-26 | End: 2018-06-26

## 2018-06-26 RX ORDER — NEOSTIGMINE METHYLSULFATE 1 MG/ML
INJECTION, SOLUTION INTRAVENOUS
Status: DISCONTINUED | OUTPATIENT
Start: 2018-06-26 | End: 2018-06-26

## 2018-06-26 RX ORDER — HYDROCODONE BITARTRATE AND ACETAMINOPHEN 5; 325 MG/1; MG/1
1 TABLET ORAL EVERY 6 HOURS PRN
Status: DISCONTINUED | OUTPATIENT
Start: 2018-06-26 | End: 2018-06-26 | Stop reason: HOSPADM

## 2018-06-26 RX ORDER — HYDROMORPHONE HYDROCHLORIDE 1 MG/ML
0.2 INJECTION, SOLUTION INTRAMUSCULAR; INTRAVENOUS; SUBCUTANEOUS EVERY 5 MIN PRN
Status: DISCONTINUED | OUTPATIENT
Start: 2018-06-26 | End: 2018-06-26 | Stop reason: HOSPADM

## 2018-06-26 RX ORDER — LABETALOL HYDROCHLORIDE 5 MG/ML
INJECTION, SOLUTION INTRAVENOUS
Status: DISCONTINUED | OUTPATIENT
Start: 2018-06-26 | End: 2018-06-26

## 2018-06-26 RX ORDER — LIDOCAINE HCL/PF 100 MG/5ML
SYRINGE (ML) INTRAVENOUS
Status: DISCONTINUED | OUTPATIENT
Start: 2018-06-26 | End: 2018-06-26

## 2018-06-26 RX ORDER — DEXAMETHASONE SODIUM PHOSPHATE 4 MG/ML
INJECTION, SOLUTION INTRA-ARTICULAR; INTRALESIONAL; INTRAMUSCULAR; INTRAVENOUS; SOFT TISSUE
Status: DISCONTINUED | OUTPATIENT
Start: 2018-06-26 | End: 2018-06-26

## 2018-06-26 RX ORDER — ACETAMINOPHEN 10 MG/ML
INJECTION, SOLUTION INTRAVENOUS
Status: DISCONTINUED | OUTPATIENT
Start: 2018-06-26 | End: 2018-06-26

## 2018-06-26 RX ORDER — FENTANYL CITRATE 50 UG/ML
25 INJECTION, SOLUTION INTRAMUSCULAR; INTRAVENOUS EVERY 5 MIN PRN
Status: DISCONTINUED | OUTPATIENT
Start: 2018-06-26 | End: 2018-06-26 | Stop reason: HOSPADM

## 2018-06-26 RX ORDER — FENTANYL CITRATE 50 UG/ML
INJECTION, SOLUTION INTRAMUSCULAR; INTRAVENOUS
Status: DISCONTINUED | OUTPATIENT
Start: 2018-06-26 | End: 2018-06-26

## 2018-06-26 RX ADMIN — LABETALOL HYDROCHLORIDE 10 MG: 5 INJECTION, SOLUTION INTRAVENOUS at 08:06

## 2018-06-26 RX ADMIN — FENTANYL CITRATE 25 MCG: 50 INJECTION INTRAMUSCULAR; INTRAVENOUS at 08:06

## 2018-06-26 RX ADMIN — LIDOCAINE HYDROCHLORIDE 75 MG: 20 INJECTION, SOLUTION INTRAVENOUS at 07:06

## 2018-06-26 RX ADMIN — PHENYLEPHRINE HYDROCHLORIDE 200 MCG: 10 INJECTION INTRAVENOUS at 08:06

## 2018-06-26 RX ADMIN — MIDAZOLAM HYDROCHLORIDE 2 MG: 1 INJECTION, SOLUTION INTRAMUSCULAR; INTRAVENOUS at 07:06

## 2018-06-26 RX ADMIN — PROPOFOL 50 MG: 10 INJECTION, EMULSION INTRAVENOUS at 08:06

## 2018-06-26 RX ADMIN — PHENYLEPHRINE HYDROCHLORIDE 100 MCG: 10 INJECTION INTRAVENOUS at 08:06

## 2018-06-26 RX ADMIN — DEXAMETHASONE SODIUM PHOSPHATE 4 MG: 4 INJECTION, SOLUTION INTRAMUSCULAR; INTRAVENOUS at 08:06

## 2018-06-26 RX ADMIN — HYDROCODONE BITARTRATE AND ACETAMINOPHEN 1 TABLET: 5; 325 TABLET ORAL at 11:06

## 2018-06-26 RX ADMIN — FENTANYL CITRATE 100 MCG: 50 INJECTION INTRAMUSCULAR; INTRAVENOUS at 07:06

## 2018-06-26 RX ADMIN — ROCURONIUM BROMIDE 10 MG: 10 INJECTION, SOLUTION INTRAVENOUS at 08:06

## 2018-06-26 RX ADMIN — ACETAMINOPHEN 1000 MG: 10 INJECTION, SOLUTION INTRAVENOUS at 07:06

## 2018-06-26 RX ADMIN — LIDOCAINE HYDROCHLORIDE 100 MG: 20 INJECTION, SOLUTION INTRAVENOUS at 07:06

## 2018-06-26 RX ADMIN — FENTANYL CITRATE 50 MCG: 50 INJECTION INTRAMUSCULAR; INTRAVENOUS at 08:06

## 2018-06-26 RX ADMIN — PHENYLEPHRINE HYDROCHLORIDE 100 MCG: 10 INJECTION INTRAVENOUS at 07:06

## 2018-06-26 RX ADMIN — SODIUM CHLORIDE, SODIUM LACTATE, POTASSIUM CHLORIDE, AND CALCIUM CHLORIDE: .6; .31; .03; .02 INJECTION, SOLUTION INTRAVENOUS at 06:06

## 2018-06-26 RX ADMIN — PHENYLEPHRINE HYDROCHLORIDE 200 MCG: 10 INJECTION INTRAVENOUS at 09:06

## 2018-06-26 RX ADMIN — LABETALOL HYDROCHLORIDE 5 MG: 5 INJECTION, SOLUTION INTRAVENOUS at 08:06

## 2018-06-26 RX ADMIN — FENTANYL CITRATE 25 MCG: 50 INJECTION INTRAMUSCULAR; INTRAVENOUS at 09:06

## 2018-06-26 RX ADMIN — GLYCOPYRROLATE 0.6 MG: 0.2 INJECTION, SOLUTION INTRAMUSCULAR; INTRAVENOUS at 09:06

## 2018-06-26 RX ADMIN — LABETALOL HYDROCHLORIDE 5 MG: 5 INJECTION, SOLUTION INTRAVENOUS at 09:06

## 2018-06-26 RX ADMIN — PROPOFOL 190 MG: 10 INJECTION, EMULSION INTRAVENOUS at 07:06

## 2018-06-26 RX ADMIN — ROCURONIUM BROMIDE 5 MG: 10 INJECTION, SOLUTION INTRAVENOUS at 09:06

## 2018-06-26 RX ADMIN — LABETALOL HYDROCHLORIDE 2.5 MG: 5 INJECTION, SOLUTION INTRAVENOUS at 09:06

## 2018-06-26 RX ADMIN — ROCURONIUM BROMIDE 40 MG: 10 INJECTION, SOLUTION INTRAVENOUS at 07:06

## 2018-06-26 RX ADMIN — NEOSTIGMINE METHYLSULFATE 3 MG: 1 INJECTION INTRAVENOUS at 09:06

## 2018-06-26 RX ADMIN — ONDANSETRON 4 MG: 2 INJECTION, SOLUTION INTRAMUSCULAR; INTRAVENOUS at 08:06

## 2018-06-26 NOTE — INTERVAL H&P NOTE
The patient has been examined and the H&P has been reviewed:    I concur with the findings and no changes have occurred since H&P was written.    Anesthesia/Surgery risks, benefits and alternative options discussed and understood by patient/family.          Active Hospital Problems    Diagnosis  POA    Nose obstruction [J34.89]  Yes      Resolved Hospital Problems    Diagnosis Date Resolved POA   No resolved problems to display.

## 2018-06-26 NOTE — BRIEF OP NOTE
Brief Operative Note     SUMMARY     Surgery Date: 6/26/2018     Surgeon(s) and Role:     * Sina Cortez MD - Primary    Pre-op Diagnosis:  Nose obstruction [J34.89]  Sinusitis chronic, ethmoidal [J32.2]  Hypertrophy of nasal turbinates [J34.3]  Nasal septal deviation [J34.2]  Chronic maxillary sinusitis [J32.0]    Post-op Diagnosis:  Nose obstruction [J34.89]  Sinusitis chronic, ethmoidal [J32.2]  Hypertrophy of nasal turbinates [J34.3]  Nasal septal deviation [J34.2]  Chronic maxillary sinusitis [J32.0]    Procedure:  Image guided FESS with bilateral maxillary antrostomy and tissue removal, bilateral total ethmoidectomy, Nasal septal reconstruction with turbinate reduction    Anesthesia: General    Findings/Key Components:  Severe nasal septal deviation to the left with hypertrophic inferior and middle turbinates, bilateral nasal obstruction, chronic bilateral maxillary and ethmoid polyposis, infundibular obstruction with polyposis    Estimated Blood Loss: less than 100 ml.    Wafers - 2  XRS  Floseal - 2 units         Specimens     Start     Ordered    06/26/18 0940  Specimen to Pathology - Surgery  Once      06/26/18 0940            Thank You  Sina Cortez MD

## 2018-06-26 NOTE — ANESTHESIA PREPROCEDURE EVALUATION
06/26/2018  Stanley Faustin is a 57 y.o., male.    Anesthesia Evaluation    I have reviewed the Patient Summary Reports.     I have reviewed the Medications.     Review of Systems  Anesthesia Hx:  No problems with previous Anesthesia    Social:  Smoker, Alcohol Use    Cardiovascular:   Hypertension Past MI CAD   hyperlipidemia    Neurological:   Headaches    Endocrine:   Diabetes        Physical Exam  General:  Well nourished, Obesity    Airway/Jaw/Neck:  Airway Findings: Mouth Opening: Normal Tongue: Normal  General Airway Assessment: Adult  Mallampati: II  TM Distance: Normal, at least 6 cm  Jaw/Neck Findings:  Neck ROM: Normal ROM      Dental:  Dental Findings: In tact   Chest/Lungs:  Chest/Lungs Findings: Clear to auscultation, Normal Respiratory Rate     Heart/Vascular:  Heart Findings: Rate: Normal        Mental Status:  Mental Status Findings:  Cooperative, Alert and Oriented         Anesthesia Plan  Type of Anesthesia, risks & benefits discussed:  Anesthesia Type:  general  Patient's Preference:   Intra-op Monitoring Plan: standard ASA monitors  Intra-op Monitoring Plan Comments:   Post Op Pain Control Plan: multimodal analgesia, IV/PO Opioids PRN and per primary service following discharge from PACU  Post Op Pain Control Plan Comments:   Induction:   IV  Beta Blocker:  Patient is not currently on a Beta-Blocker (No further documentation required).       Informed Consent: Patient understands risks and agrees with Anesthesia plan.  Questions answered. Anesthesia consent signed with patient.  ASA Score: 3     Day of Surgery Review of History & Physical:    H&P update referred to the surgeon.         Ready For Surgery From Anesthesia Perspective.

## 2018-06-26 NOTE — TRANSFER OF CARE
"Anesthesia Transfer of Care Note    Patient: Stanley Faustin    Procedure(s) Performed: Procedure(s) (LRB):  RECONSTRUCTION-NASAL (Bilateral)  TURBINATE CAUTERY RESECTION WITH DEBRIDER (CRISTINA. MAXILLARY & CRISTINA. ETHMOID) (Bilateral)  SINUS SURGERY FUNCTIONAL ENDOSCOPIC WITH NAVIGATION (Bilateral)    Patient location: PACU    Anesthesia Type: general    Transport from OR: Transported from OR on room air with adequate spontaneous ventilation    Post pain: adequate analgesia    Post assessment: no apparent anesthetic complications and tolerated procedure well    Post vital signs: stable    Level of consciousness: awake, alert and oriented    Nausea/Vomiting: no nausea/vomiting    Complications: none    Transfer of care protocol was followed      Last vitals:   Visit Vitals  BP (!) 142/79 (BP Location: Left arm, Patient Position: Lying)   Pulse 89   Temp 36.6 °C (97.9 °F) (Oral)   Resp 12   Ht 5' 9" (1.753 m)   Wt 91.1 kg (200 lb 13.4 oz)   SpO2 98%   BMI 29.66 kg/m²     "

## 2018-06-26 NOTE — ANESTHESIA POSTPROCEDURE EVALUATION
"Anesthesia Post Evaluation    Patient: Stanley Faustin    Procedure(s) Performed: Procedure(s) (LRB):  RECONSTRUCTION-NASAL (Bilateral)  TURBINATE CAUTERY RESECTION WITH DEBRIDER (CRISTINA. MAXILLARY & CRISTINA. ETHMOID) (Bilateral)  SINUS SURGERY FUNCTIONAL ENDOSCOPIC WITH NAVIGATION (Bilateral)    Final Anesthesia Type: general  Patient location during evaluation: PACU  Patient participation: Yes- Able to Participate  Level of consciousness: awake and alert and oriented  Post-procedure vital signs: reviewed and stable  Pain management: adequate  Airway patency: patent  PONV status at discharge: No PONV  Anesthetic complications: no      Cardiovascular status: blood pressure returned to baseline and hemodynamically stable  Respiratory status: unassisted, spontaneous ventilation and room air  Hydration status: euvolemic  Follow-up not needed.        Visit Vitals  /81   Pulse 74   Temp 36.6 °C (97.9 °F) (Oral)   Resp 16   Ht 5' 9" (1.753 m)   Wt 91.1 kg (200 lb 13.4 oz)   SpO2 98%   BMI 29.66 kg/m²       Pain/Reginald Score: Pain Assessment Performed: Yes (6/26/2018 10:00 AM)  Presence of Pain: denies (6/26/2018 10:00 AM)  Reginald Score: 8 (6/26/2018 10:00 AM)      "

## 2018-06-26 NOTE — DISCHARGE INSTRUCTIONS
"Dr Cortez Nasal Surgery Instructions.  Office # [phone]    Do not take aspirin, Advil, (Ibuprofen) etc after surgery.  Tylenol is fine.  The aspirin like medications can cause you to bleed easily.    Do not blow your nose.  You may "sniff" to clear your nose.  You may clean the end of your nose gently.  If congestions is extreme and you are miserable, you may use Afrin nasal spray to open the nose and relieve some congestion.  Do not use Afrin more than twice a day.  The period of congestion is about a week.     We do not expect you to have a nosebleed after surgery.  Call the office if your nose begins to bleed bright red blood.  About one to two weeks after surgery the sinuses begin to work again and to rid themselves of any old blood from surgery.  At this time, you may sniff and see old, purple blood in the mucous you spit up.  This is of no concern and does not last long.    Activity is limited to indoor movement and should be minimal during the week of your surgery.  This is a serious surgery and patients often forget because they do not look bruised or swollen externally.  No lifting or straining or overheating is allowed.  Exercise such as golf, tennis, aerobic exercise, walking in the heat, etc, are forbidden for 4-6 weeks.  Limited work indoors without exertion may be resumed after you see the doctor.    Do not drive, drink alcohol, or sign legal documents for 24 hours, or if taking narcotic pain medication.      If your occupation involves heavy lifting, physical exertion, or a hot or mk environment, you will need to make arrangements for light duty indoors or to take 4-6 weeks off to allow for proper healing.      Resume your regular medications after surgery.  If you use nose spray or allergy and sinus medications, please discuss them with the office before resuming them after surgery.    Be sure to keep to keep your follow up appointment at the office.    Fall Prevention  Millions of people fall " every year and injure themselves. You may have had anesthesia or sedation which may increase your risk of falling. You may have health issues that put you at an increased risk of falling.     Here are ways to reduce your risk of falling.  ·   · Make your home safe by keeping walkways clear of objects you may trip over.  · Use non-slip pads under rugs. Do not use area rugs or small throw rugs.  · Use non-slip mats in bathtubs and showers.  · Install handrails and lights on staircases.  · Do not walk in poorly lit areas.  · Do not stand on chairs or wobbly ladders.  · Use caution when reaching overhead or looking upward. This position can cause a loss of balance.  · Be sure your shoes fit properly, have non-slip bottoms and are in good condition.   · Wear shoes both inside and out. Avoid going barefoot or wearing slippers.  · Be cautious when going up and down stairs, curbs, and when walking on uneven sidewalks.  · If your balance is poor, consider using a cane or walker.  · If your fall was related to alcohol use, stop or limit alcohol intake.   · If your fall was related to use of sleeping medicines, talk to your doctor about this. You may need to reduce your dosage at bedtime if you awaken during the night to go to the bathroom.    · To reduce the need for nighttime bathroom trips:  ¨ Avoid drinking fluids for several hours before going to bed  ¨ Empty your bladder before going to bed  ¨ Men can keep a urinal at the bedside  · Stay as active as you can. Balance, flexibility, strength, and endurance all come from exercise. They all play a role in preventing falls. Ask your healthcare provider which types of activity are right for you.  · Get your vision checked on a regular basis.  · If you have pets, know where they are before you stand up or walk so you don't trip over them.  Use night lights.

## 2018-06-26 NOTE — H&P (VIEW-ONLY)
[facility]  Progress Note    SUBJECTIVE:     Chief Complaint   Patient presents with    Pre-op Exam       HPI   Stanley Faustin  is a 57 y.o. male here for pre-op evaluation for sinus surgery on 6/26/18, with Dr. Cortez. Doing well otherwise and has no new complaints/problems at this time.    Review of patient's allergies indicates:  No Known Allergies    Past Medical History:   Diagnosis Date    Coronary artery disease     s/p stent    Diabetes mellitus     Diabetes mellitus type II     Hyperlipidemia     Hypertension     Myocardial infarction      Past Surgical History:   Procedure Laterality Date    CORONARY ANGIOPLASTY WITH STENT PLACEMENT      TONSILLECTOMY      UVULOPALATOPHARYNGOPLASTY      for REJI     Family History   Problem Relation Age of Onset    Diabetes Mother     Diabetes Father     Heart disease Father     Mental illness Brother         suicide    Cancer Neg Hx         prostate or colon     Social History   Substance Use Topics    Smoking status: Current Every Day Smoker     Packs/day: 0.50     Types: Cigarettes    Smokeless tobacco: Never Used      Comment:  x 32 yr.  Works at [employer].  Three kids.  Walks a lot at work.      Alcohol use Yes      Comment: only on occasion        Review of Systems   Constitutional: Negative for chills, fever and malaise/fatigue.   HENT: Negative.    Respiratory: Negative.  Negative for cough and shortness of breath.    Cardiovascular: Negative.  Negative for chest pain.   Gastrointestinal: Negative.  Negative for abdominal pain, nausea and vomiting.   Genitourinary: Negative.    Neurological: Negative for weakness and headaches.   All other systems reviewed and are negative.    OBJECTIVE:     Vitals:    06/11/18 1302   BP: 120/80   Pulse: 90   Temp: 98.2 °F (36.8 °C)     Body mass index is 29.64 kg/m².    Physical Exam   Constitutional: He is oriented to person, place, and time and well-developed, well-nourished, and  in no distress. No distress.   HENT:   Head: Normocephalic and atraumatic.   Right Ear: Tympanic membrane is not perforated, not erythematous and not bulging. No hemotympanum.   Left Ear: Tympanic membrane is not perforated, not erythematous and not bulging. No hemotympanum.   Mouth/Throat: Oropharynx is clear and moist. No oropharyngeal exudate.   Eyes: Conjunctivae and EOM are normal. Pupils are equal, round, and reactive to light.   Neck: No thyromegaly present.   Cardiovascular: Normal rate, regular rhythm and normal heart sounds.  Exam reveals no gallop and no friction rub.    No murmur heard.  Pulmonary/Chest: Effort normal and breath sounds normal. No respiratory distress. He has no wheezes. He has no rales.   Abdominal: Soft. Bowel sounds are normal. He exhibits no distension. There is no tenderness. There is no rebound and no guarding.   Musculoskeletal: Normal range of motion. He exhibits no edema or tenderness.   Lymphadenopathy:     He has no cervical adenopathy.   Neurological: He is alert and oriented to person, place, and time.   Skin: Skin is warm. No rash noted. He is not diaphoretic. No erythema.       Old records were reviewed. Labs and/or images were independently reviewed.    ASSESSMENT     1. Pre-op evaluation    2. Hypertension associated with diabetes    3. Type 2 diabetes mellitus without complication, with long-term current use of insulin        PLAN:     Pre-op evaluation  -     CBC auto differential; Future  -     Comprehensive metabolic panel; Future  -     Protime-INR; Future; Expected date: 06/11/2018  -     APTT; Future; Expected date: 06/11/2018  -     Urinalysis; Future  -     EKG 12-lead    Hypertension associated with diabetes   -     Stable. Continue current regimen.    Type 2 diabetes mellitus without complication, with long-term current use of insulin  -     Hemoglobin A1c; Future  -     Lipid panel; Future  -     Stable. Continue current regimen.      DETSKY SCORE  Risk  Factors: Patient   1. Age older than 70 years: 5 points  2. Prior Myocardial Infarction   1. Last infarction within 6 months: 10 points  2. Last infarction more than 6 months ago: 5 points  3. Unstable Angina within last 6 months: 10 points  4. Angina Pectoris   1. [place] Angina Class 3: 10 points  2. [place] Angina Class 4: 20 points  5. Alveolar pulmonary edema   1. Pulmonary edema within one week: 10 points  2. Pulmonary edema at any time: 5 points  6. Suspected critical Aortic Stenosis: 20 points  7. Arrhythmia   1. Rhythm other than sinus or sinus with PACs: 5 points  2. More than five premature ventricular beats: 5 points  8. Emergency surgery: 10 points  9. Poor general medical status: 5 points   1. Based on [place] Risk Index    Interpretation   1. Class 1: Points 0-15 (Low risk)  2. Class 2: Points 20-30 (Moderate risk)  3. Class 3: Points >30 (High risk)     [place] Class 1, which translates into low risk for a low risk procedure. Please proceed with caution. Advised no ASA/NSAIDs 2 weeks prior to surgery.     RTC KANDY Khan MD  06/11/2018 1:16 PM

## 2018-06-26 NOTE — PLAN OF CARE
"Problem: Patient Care Overview  Goal: Plan of Care Review  Outcome: Ongoing (interventions implemented as appropriate)   06/26/18 1028   Coping/Psychosocial   Plan Of Care Reviewed With patient     Reginald 9/10. VSS per flow sheet. Drowsy; awakens to voice. Describes pain as "a little" and does not desire IV pain medication. Mustache dressing in place - cdi. See chart for full assessment.     Problem: Surgery Nonspecified (Adult)  Goal: Signs and Symptoms of Listed Potential Problems Will be Absent, Minimized or Managed (Surgery Nonspecified)  Signs and symptoms of listed potential problems will be absent, minimized or managed by discharge/transition of care (reference Surgery Nonspecified (Adult) CPG).   Outcome: Ongoing (interventions implemented as appropriate)   06/26/18 1028   Surgery Nonspecified   Problems Assessed (Surgery) bleeding/anemia;pain;postoperative nausea and vomiting;respiratory compromise;situational response   Problems Present (Surgery) pain     Goal: Anesthesia/Sedation Recovery  Outcome: Outcome(s) achieved Date Met: 06/26/18 06/26/18 1028   Goal/Outcome Evaluation   Anesthesia/Sedation Recovery progressing toward baseline;criteria met for transfer         "

## 2018-06-27 LAB — POCT GLUCOSE: 221 MG/DL (ref 70–110)

## 2018-07-02 NOTE — OP NOTE
DATE OF PROCEDURE:  06/26/2018    PROCEDURE:Bilateral maxillary antrostomy with tissue removal, bilateral total ethmoidectomy with tissue removal, nasal septal reconstruction with turbinate cautery reduction.    PREOPERATIVE DIAGNOSIS:  Chronic bilateral maxillary sinusitis, chronic   bilateral ethmoid sinusitis, severely hypertrophic nasal turbinates, bilateral nasal   obstruction.    POSTOPERATIVE DIAGNOSIS:  Chronic bilateral maxillary sinusitis, chronic   bilateral ethmoid sinusitis, severely hypertrophic nasal turbinates, bilateral nasal   obstruction.    PROCEDURE IN DETAIL:  Mr. Stanley Faustin was given adequate preoperative   sedation, brought to the Operating Room where he was placed in supine position,   placed under general anesthesia.  He was draped in usual sterile fashion for   surgery of the nose and sinuses.  Examination of his external nose was   unremarkable.  Examination of the internal nose revealed a severe C-shaped   deformity of the nasal septum with being convex to the left, concave to the   right with extreme hypertrophy of the nasal turbinates and bilateral nasal   obstruction.  There was some polyposis visible in the middle meatus bilaterally   as well.    The nasal septum was injected with approximately 4 mL of 1:60,000 solution of   epinephrine and saline.  A small amount was injected to each middle turbinate at   the same time.  A transfixed incision was made.  Mucoperichondrial flaps were   elevated and a [place] type swinging door septoplasty was done on the anterior   septum along with a vertical incision at the junction of the quadrangular   cartilage with the perpendicular plane of the ethmoid and vomer.  This allowed   the nasal septum cartilage to be placed in the midline using Rama   forceps to be severely deviated perpendicular plate of ethmoid and vomer was   removed submucosally where necessary.  This allowed the bony portion of the   nasal septum to be  relocated to the midline as well aligning the nose properly.    The septum was then splinted in place with x-ray splints that were sutured in   place with a single 3-0 Prolene suture.  The transfixion incision was closed   with interrupted 4-0 chromic sutures.  With the septum in the midline,   outfracture and submucosal cauterization of the inferior turbinates was done   front to back bilaterally, which opened the nose nicely and afforded a view of   the nasopharynx.  Then using the image-guided system and beginning on the right   side of the nasal telescope with camera system was introduced into the nose and   the middle meatus was filled with polypoid debris.  This was removed using a   powered microdebrider.  The uncinate process was removed manually, exposing the   maxillary natural ostium, the maxillary sinus was closed with heavy polypoid   debris.  This was present inside the sinus as well.  A wide antrostomy was done   and a large amount of polypoid debris and mucopurulent material was removed from   the right maxillary sinus.  With this irrigated clear, the ethmoid bulla was   opened and using the image-guided system, polypoid debris was exonerated from   the posterior cells anteriorly all the way to the sphenoid rostrum and then   working superiorly an anterior ethmoidectomy was done through the frontal   recess.  Polypoid debris was present throughout the anterior cells as well.    With this done, the ethmoid cavities were packed and attention was then turned   to the left side, the exact same thing was encountered and polypoid debris was   removed from the middle meatus using the debrider.  The uncinate process was   removed manually, and also with the debrider.  A wide maxillary antrostomy was   done.  This purulent material was irrigated from the sinus and polypoid debris   was removed from the maxillary sinus.  The ethmoid bulla was opened and there   were scattered polypoid debris throughout the  anterior and posterior ethmoid   cells.  All of this was exonerated using mostly the powered microdebrider with   some manual dissection in critical areas.  The image-guided system was utilized   during the ethmoidectomy.  The superior meatus was clear on both sides.  The   frontal recess was clear after the ethmoidectomy was done on both sides and   nothing further was required.  The left side was also packed and then all packs   were removed.  FloSeal was introduced into the ethmoid cavity and middle meatus   bilaterally, which gave complete hemostasis and a Silastic wafer was placed   lateral to each middle turbinate to prevent adhesion.  The entire procedure was   terminated with an estimated total blood loss of less than 50 mL.  Mr. Faustin was awakened, extubated and taken to the Recovery Room in good   condition.          EBR/IN  dd: 06/29/2018 09:20:55 (CDT)  td: 06/29/2018 17:26:56 (CDT)  Doc ID   #[id number]  Job ID #[id number]    CC:

## 2018-07-02 NOTE — DISCHARGE SUMMARY
DATE OF ADMISSION:  06/26/2018    DATE OF DISCHARGE:  06/26/2018    FINAL DIAGNOSIS.  Severe nasal septal deviation to the left with hypertrophic   nasal turbinates, bilateral nasal obstruction with chronic bilateral maxillary   sinusitis, chronic bilateral anterior and posterior ethmoid sinusitis.    PROCEDURE WHILE IN HOSPITAL:  Nasal septal reconstruction with turbinate cautery   reduction image-guided functional endoscopic sinus surgery with bilateral   maxillary antrostomy with tissue removal, bilateral total ethmoidectomy with   tissue removal.    CLINICAL HISTORY:  Mr. Stanley Faustin is 57-year old male who is well   known to me who has had a severe nasal septal deviation with hypertrophic   allergic nasal turbinates, bilateral nasal obstruction and chronic refractory   maxillary and ethmoid sinusitis, which has not responded to vigorous medical therapy.  He was   admitted to the hospital and taken to the Operating Room where a correction of   his nasal septal deviation was done along with  turbinate reduction, bilateral maxillary antrostomy with  removal   of polypoid tissue and debris and bilateral anterior and posterior ethmoidectomies, both   ethmoids contained polypoid debris and inflammatory changes throughout.  He is   now postoperative and is doing well.  He is discharged to follow up in my office   in 1 week.  His condition at discharge is good.  Laboratory obtained for the   hospital was satisfactory.  Careful postoperative instructions were given orally   as well as in writing to Mr. Faustin and his family.  His diet is not   restricted.  His activity is restricted.  There is sedentary indoor activity   until he is seen in my office in 1 week where x-ray splints and Silastic wafers   that were placed at the time of surgery will be removed.    He has our 24-hour answering service number should any questions or problems   arise.      EBR/IN  dd: 06/29/2018 09:20:55 (CDT)  td: 06/30/2018  11:54:21 (CDT)  Doc ID   #[id number]  Job ID #[id number]    CC:

## 2018-08-24 ENCOUNTER — TELEPHONE (OUTPATIENT)
Dept: ADMINISTRATIVE | Facility: HOSPITAL | Age: 58
End: 2018-08-24

## 2018-09-24 RX ORDER — GLYBURIDE-METFORMIN HYDROCHLORIDE 5; 500 MG/1; MG/1
TABLET ORAL
Qty: 180 TABLET | Refills: 3 | Status: SHIPPED | OUTPATIENT
Start: 2018-09-24 | End: 2018-11-20 | Stop reason: SDUPTHER

## 2018-10-17 ENCOUNTER — TELEPHONE (OUTPATIENT)
Dept: OTOLARYNGOLOGY | Facility: CLINIC | Age: 58
End: 2018-10-17

## 2018-10-17 NOTE — TELEPHONE ENCOUNTER
----- Message from Karlo Vieyra sent at 10/17/2018 10:55 AM CDT -----  Contact: Self/[phone]  The patient would like to speak to the staff in regards to trouble he's having with his nose. The patient stating he had surgery on his nose in 06/2018.      Thank you

## 2018-10-31 ENCOUNTER — OFFICE VISIT (OUTPATIENT)
Dept: OTOLARYNGOLOGY | Facility: CLINIC | Age: 58
End: 2018-10-31
Payer: COMMERCIAL

## 2018-10-31 VITALS
WEIGHT: 204 LBS | HEIGHT: 69 IN | DIASTOLIC BLOOD PRESSURE: 60 MMHG | SYSTOLIC BLOOD PRESSURE: 100 MMHG | BODY MASS INDEX: 30.21 KG/M2

## 2018-10-31 DIAGNOSIS — J30.1 SEASONAL ALLERGIC RHINITIS DUE TO POLLEN: ICD-10-CM

## 2018-10-31 DIAGNOSIS — H69.91 DYSFUNCTION OF RIGHT EUSTACHIAN TUBE: ICD-10-CM

## 2018-10-31 DIAGNOSIS — B96.89 ACUTE BACTERIAL RHINOSINUSITIS: Primary | ICD-10-CM

## 2018-10-31 DIAGNOSIS — J01.90 ACUTE BACTERIAL RHINOSINUSITIS: Primary | ICD-10-CM

## 2018-10-31 DIAGNOSIS — Z98.890 STATUS POST FUNCTIONAL ENDOSCOPIC SINUS SURGERY (FESS): ICD-10-CM

## 2018-10-31 PROCEDURE — 99212 OFFICE O/P EST SF 10 MIN: CPT | Mod: S$GLB,,, | Performed by: OTOLARYNGOLOGY

## 2018-10-31 PROCEDURE — 87077 CULTURE AEROBIC IDENTIFY: CPT

## 2018-10-31 PROCEDURE — 87186 SC STD MICRODIL/AGAR DIL: CPT

## 2018-10-31 PROCEDURE — 87070 CULTURE OTHR SPECIMN AEROBIC: CPT

## 2018-10-31 RX ORDER — CIPROFLOXACIN 500 MG/1
500 TABLET ORAL 2 TIMES DAILY
Qty: 20 TABLET | Refills: 0 | Status: SHIPPED | OUTPATIENT
Start: 2018-10-31 | End: 2018-11-10

## 2018-10-31 NOTE — PATIENT INSTRUCTIONS
Take the Cipro twice a day with food for 10 days.  Wear sunscreen in be cautious in the sun when you are on antibiotics.    We will contact you with the results of your culture.    I want to see you back in 3 weeks to recheck your nose and sinuses with the camera system.

## 2018-10-31 NOTE — PROGRESS NOTES
History of Present Illness:  Stanley Faustin presents to the clinic today for Cough; Sinusitis; and Other (Eustachian Tube Dysfunction)  .  He is a 58-year-old diabetic with hypertension, ASCVD, coronary artery disease with stent, who had a nasal septal reconstruction with turbinate reduction and functional endoscopic sinus surgery with bilateral maxillary antrostomy, bilateral ethmoidectomy on 06/26/2018.  He is generally done well since surgery and sign os copy on 08/18/2018 was normal postop.  He is now developed symptoms of sinusitis with purulent postnasal drainage causing a cough and blockage of his right ear for the last 2 days.  He is unable to Valsalva and clear his right ear.    Past Medical History:   Diagnosis Date    Cluster headaches     Coronary artery disease     s/p stent    Diabetes mellitus     Diabetes mellitus type II     Headache     High cholesterol     Hyperlipidemia     Hypertension     Myocardial infarction      Past Surgical History:   Procedure Laterality Date    COLONOSCOPY N/A 8/17/2015    [facility]formed by [facility] at [facility] ENDO    CORONARY ANGIOPLASTY WITH STENT PLACEMENT      FUNCTIONAL ENDOSCOPIC SINUS SURGERY (FESS) USING COMPUTER-ASSISTED NAVIGATION Bilateral 6/26/2018    Procedure: SINUS SURGERY FUNCTIONAL ENDOSCOPIC WITH NAVIGATION;  Surgeon: Sina Cortez MD;  Location: [facility] OR;  Service: ENT;  Laterality: Bilateral;    left shoulder      RECONSTRUCTION OF NOSE Bilateral 6/26/2018    Procedure: RECONSTRUCTION-NASAL;  Surgeon: Sina Cortez MD;  Location: [facility] OR;  Service: ENT;  Laterality: Bilateral;  [facility]  RN PREOP 6/20/2018    RECONSTRUCTION-NASAL Bilateral 6/26/2018    Performed by [facility] MD at [facility] OR    SINUS SURGERY FUNCTIONAL ENDOSCOPIC WITH NAVIGATION Bilateral 6/26/2018    Performed by [facility] MD at [facility] OR    TONSILLECTOMY      TURBINATE CAUTERY RESECTION WITH DEBRIDER (CRISTINA. MAXILLARY & CRISTINA. ETHMOID) Bilateral 6/26/2018     Performed [facility] at [facility] OR    TURBINATE RESECTION Bilateral 6/26/2018    Procedure: TURBINATE CAUTERY RESECTION WITH DEBRIDER (CRISTINA. MAXILLARY & CRISTINA. ETHMOID);  Surgeon: Sina Cortez MD;  Location: [facility] OR;  Service: ENT;  Laterality: Bilateral;    UVULOPALATOPHARYNGOPLASTY      for REJI     Family History   Problem Relation Age of Onset    Diabetes Mother     Diabetes Father     Heart disease Father     Mental illness Brother         suicide    Cancer Neg Hx         prostate or colon       Social History     Tobacco Use    Smoking status: Current Every Day Smoker     Packs/day: 0.75     Types: Cigarettes    Smokeless tobacco: Never Used    Tobacco comment:  x 32 yr.  Works at [employer].  Three kids.  Walks a lot at work.     Substance Use Topics    Alcohol use: Yes     Comment: only on occasion    Drug use: No         Review of Systems   Ears: Positive for ear pressure.  Negative for hearing loss, ear pain, ear discharge and ear infections.    Nose:  Positive for nasal or sinus surgery and postnasal drip. Negative for nasal obstruction.        Physical Exam:    Constitutional:   Vital signs are normal. He appears well-developed and well-nourished. He is active.     Head:  Normocephalic. Salivary glands normal.  Facial strength is normal.      Ears:    Both ear canals are clear.  Both tympanic membranes are clear.  The right tympanic membrane is somewhat retracted and he is unable to Valsalva and clear the right ear or cause movement of the tympanic membrane.    Nose:    His nose is wide open post nasal and sinus surgery. He does have purulent drainage from the middle meatus bilaterally. A culture was taken from his nose.    Mouth/Throat  Lips, teeth, and gums normal and oropharynx normal.     Neck:  Neck normal without thyromegaly masses, asymmetry, normal tracheal structure, crepitus, and tenderness, thyroid normal, trachea normal and no adenopathy.           Assessment:   Stanley was seen today for cough, sinusitis and other.    Diagnoses and all orders for this visit:    Acute bacterial rhinosinusitis  -     Nasal culture  -     ciprofloxacin HCl (CIPRO) 500 MG tablet; Take 1 tablet (500 mg total) by mouth 2 (two) times daily. for 10 days    Seasonal allergic rhinitis due to pollen    Status post functional endoscopic sinus surgery (FESS)          Plan:   a culture was taken from his nose. It was taken specifically from the middle meatus and superior meatus.  He has purulent drainage on both sides.  I ordered Cipro 500 mg b.i.d. for 10 days.  I instructed him to Valsalva and try to open his right eustachian tube. He was not given any steroid injection because he is diabetic.  I will see him back after his antibiotic treatment with nasal and sinus endoscopy.    Follow-up in about 3 weeks (around 11/21/2018) for Endoscopic evaluation of nose and sinuses.

## 2018-11-03 LAB — BACTERIA NPH AEROBE CULT: NORMAL

## 2018-11-05 ENCOUNTER — PATIENT MESSAGE (OUTPATIENT)
Dept: OTOLARYNGOLOGY | Facility: CLINIC | Age: 58
End: 2018-11-05

## 2018-11-05 NOTE — TELEPHONE ENCOUNTER
Called patient and informed him of his Culture Results and to follow up when finished antibiotics.  Patient will call to schedule when finished antibiotics.

## 2018-11-08 ENCOUNTER — TELEPHONE (OUTPATIENT)
Dept: OTOLARYNGOLOGY | Facility: CLINIC | Age: 58
End: 2018-11-08

## 2018-11-08 NOTE — TELEPHONE ENCOUNTER
----- Message from April Rosario sent at 11/8/2018 12:44 PM CST -----  Contact: Self   Patient says he was told to call the office to schedule with Dr. Cortez to have another culture done. Please call at [phone].

## 2018-11-20 ENCOUNTER — LAB VISIT (OUTPATIENT)
Dept: LAB | Facility: HOSPITAL | Age: 58
End: 2018-11-20
Attending: FAMILY MEDICINE
Payer: COMMERCIAL

## 2018-11-20 ENCOUNTER — OFFICE VISIT (OUTPATIENT)
Dept: FAMILY MEDICINE | Facility: CLINIC | Age: 58
End: 2018-11-20
Payer: COMMERCIAL

## 2018-11-20 ENCOUNTER — OFFICE VISIT (OUTPATIENT)
Dept: OTOLARYNGOLOGY | Facility: CLINIC | Age: 58
End: 2018-11-20
Payer: COMMERCIAL

## 2018-11-20 ENCOUNTER — TELEPHONE (OUTPATIENT)
Dept: FAMILY MEDICINE | Facility: CLINIC | Age: 58
End: 2018-11-20

## 2018-11-20 VITALS
BODY MASS INDEX: 29.62 KG/M2 | DIASTOLIC BLOOD PRESSURE: 80 MMHG | WEIGHT: 200 LBS | HEIGHT: 69 IN | SYSTOLIC BLOOD PRESSURE: 100 MMHG

## 2018-11-20 VITALS
OXYGEN SATURATION: 96 % | BODY MASS INDEX: 29.63 KG/M2 | SYSTOLIC BLOOD PRESSURE: 94 MMHG | WEIGHT: 200.06 LBS | DIASTOLIC BLOOD PRESSURE: 72 MMHG | TEMPERATURE: 99 F | HEIGHT: 69 IN | HEART RATE: 111 BPM

## 2018-11-20 DIAGNOSIS — I15.2 HYPERTENSION ASSOCIATED WITH DIABETES: ICD-10-CM

## 2018-11-20 DIAGNOSIS — Z79.4 TYPE 2 DIABETES MELLITUS WITH MILD NONPROLIFERATIVE RETINOPATHY, WITH LONG-TERM CURRENT USE OF INSULIN, MACULAR EDEMA PRESENCE UNSPECIFIED, UNSPECIFIED LATERALITY: Primary | ICD-10-CM

## 2018-11-20 DIAGNOSIS — Z79.4 TYPE 2 DIABETES MELLITUS WITH MILD NONPROLIFERATIVE RETINOPATHY, WITH LONG-TERM CURRENT USE OF INSULIN, MACULAR EDEMA PRESENCE UNSPECIFIED, UNSPECIFIED LATERALITY: ICD-10-CM

## 2018-11-20 DIAGNOSIS — E11.59 HYPERTENSION ASSOCIATED WITH DIABETES: ICD-10-CM

## 2018-11-20 DIAGNOSIS — J32.4 CHRONIC PANSINUSITIS: Primary | ICD-10-CM

## 2018-11-20 DIAGNOSIS — E11.3299 TYPE 2 DIABETES MELLITUS WITH MILD NONPROLIFERATIVE RETINOPATHY, WITH LONG-TERM CURRENT USE OF INSULIN, MACULAR EDEMA PRESENCE UNSPECIFIED, UNSPECIFIED LATERALITY: Primary | ICD-10-CM

## 2018-11-20 DIAGNOSIS — E11.3299 TYPE 2 DIABETES MELLITUS WITH MILD NONPROLIFERATIVE RETINOPATHY, WITH LONG-TERM CURRENT USE OF INSULIN, MACULAR EDEMA PRESENCE UNSPECIFIED, UNSPECIFIED LATERALITY: ICD-10-CM

## 2018-11-20 LAB
ALBUMIN SERPL BCP-MCNC: 4.1 G/DL
ALP SERPL-CCNC: 70 U/L
ALT SERPL W/O P-5'-P-CCNC: 42 U/L
ANION GAP SERPL CALC-SCNC: 11 MMOL/L
AST SERPL-CCNC: 32 U/L
BILIRUB SERPL-MCNC: 1.1 MG/DL
BUN SERPL-MCNC: 9 MG/DL
CALCIUM SERPL-MCNC: 10.5 MG/DL
CHLORIDE SERPL-SCNC: 103 MMOL/L
CO2 SERPL-SCNC: 25 MMOL/L
CREAT SERPL-MCNC: 1.2 MG/DL
EST. GFR  (AFRICAN AMERICAN): >60 ML/MIN/1.73 M^2
EST. GFR  (NON AFRICAN AMERICAN): >60 ML/MIN/1.73 M^2
ESTIMATED AVG GLUCOSE: 214 MG/DL
GLUCOSE SERPL-MCNC: 212 MG/DL
HBA1C MFR BLD HPLC: 9.1 %
POTASSIUM SERPL-SCNC: 5.1 MMOL/L
PROT SERPL-MCNC: 7.9 G/DL
SODIUM SERPL-SCNC: 139 MMOL/L

## 2018-11-20 PROCEDURE — 36415 COLL VENOUS BLD VENIPUNCTURE: CPT | Mod: PO

## 2018-11-20 PROCEDURE — 83036 HEMOGLOBIN GLYCOSYLATED A1C: CPT

## 2018-11-20 PROCEDURE — 99214 OFFICE O/P EST MOD 30 MIN: CPT | Mod: S$GLB,,, | Performed by: FAMILY MEDICINE

## 2018-11-20 PROCEDURE — 99999 PR PBB SHADOW E&M-EST. PATIENT-LVL III: CPT | Mod: PBBFAC,,, | Performed by: FAMILY MEDICINE

## 2018-11-20 PROCEDURE — 80053 COMPREHEN METABOLIC PANEL: CPT

## 2018-11-20 PROCEDURE — 99212 OFFICE O/P EST SF 10 MIN: CPT | Mod: S$GLB,,, | Performed by: OTOLARYNGOLOGY

## 2018-11-20 RX ORDER — GLYBURIDE-METFORMIN HYDROCHLORIDE 5; 500 MG/1; MG/1
1 TABLET ORAL 2 TIMES DAILY WITH MEALS
Qty: 180 TABLET | Refills: 3
Start: 2018-11-20 | End: 2019-02-04 | Stop reason: SDUPTHER

## 2018-11-20 NOTE — PROGRESS NOTES
History of Present Illness:  Stanley Faustin presents to the clinic today for Follow-up (Nasal Culture)  .  He is here for follow-up after culture directed antibiotics for Pseudomonas sinusitis.  He was on Cipro which was affective on the culture results.  He has completely cleared his sinusitis symptoms and has no further postnasal drainage.  He feels fine.    Past Medical History:   Diagnosis Date    Cluster headaches     Coronary artery disease     s/p stent    Diabetes mellitus     Diabetes mellitus type II     Headache     High cholesterol     Hyperlipidemia     Hypertension     Myocardial infarction      Past Surgical History:   Procedure Laterality Date    COLONOSCOPY N/A 8/17/2015    Performed by [facility] at [facility] ENDO    CORONARY ANGIOPLASTY WITH STENT PLACEMENT      FUNCTIONAL ENDOSCOPIC SINUS SURGERY (FESS) USING COMPUTER-ASSISTED NAVIGATION Bilateral 6/26/2018    Procedure: SINUS SURGERY FUNCTIONAL ENDOSCOPIC WITH NAVIGATION;  Surgeon: Sina Cortez MD;  Location: [facility] OR;  Service: ENT;  Laterality: Bilateral;    left shoulder      RECONSTRUCTION OF NOSE Bilateral 6/26/2018    Procedure: RECONSTRUCTION-NASAL;  Surgeon: Sina Cortez MD;  Location: [facility] OR;  Service: ENT;  Laterality: Bilateral;  [facility]  RN PREOP 6/20/2018    RECONSTRUCTION-NASAL Bilateral 6/26/2018    Performed by [facility]ez MD at [facility] OR    SINUS SURGERY FUNCTIONAL ENDOSCOPIC WITH NAVIGATION Bilateral 6/26/2018    Performed by [facility]ez MD at [facility] OR    TONSILLECTOMY      TURBINATE CAUTERY RESECTION WITH DEBRIDER (CRISTINA. MAXILLARY & CRISTINA. ETHMOID) Bilateral 6/26/2018    Performed by [facility] MD at [facility] OR    TURBINATE RESECTION Bilateral 6/26/2018    Procedure: TURBINATE CAUTERY RESECTION WITH DEBRIDER (CRISTINA. MAXILLARY & CRISTINA. ETHMOID);  Surgeon: Sina Cortez MD;  Location: [facility] OR;  Service: ENT;  Laterality: Bilateral;    UVULOPALATOPHARYNGOPLASTY      for REJI     Family History    Problem Relation Age of Onset    Diabetes Mother     Diabetes Father     Heart disease Father     Mental illness Brother         suicide    Cancer Neg Hx         prostate or colon       Social History     Tobacco Use    Smoking status: Current Every Day Smoker     Packs/day: 0.75     Types: Cigarettes    Smokeless tobacco: Never Used    Tobacco comment:  x 32 yr.  Works at [employer].  Three kids.  Walks a lot at work.     Substance Use Topics    Alcohol use: Yes     Comment: only on occasion    Drug use: No         Review of Systems     Nose:  Negative for nasal obstruction and postnasal drip.        Physical Exam:    Constitutional:   Vital signs are normal. He appears well-developed and well-nourished. He is active.     Head:  Normocephalic. Salivary glands normal.  Facial strength is normal.      Nose:  Nose normal including turbinates, nasal mucosa, sinuses and nasal septum.     Neck:  Neck normal without thyromegaly masses, asymmetry, normal tracheal structure, crepitus, and tenderness.                      Assessment:   Stanley was seen today for follow-up.    Diagnoses and all orders for this visit:    Chronic pansinusitis          Plan:   his sinusitis has completely cleared on Cipro.  No further treatment is required at this time.  His examination of the nose is normal.    Follow-up if symptoms worsen or fail to improve.

## 2018-11-20 NOTE — PROGRESS NOTES
[facility]  Progress Note    SUBJECTIVE:     Chief Complaint   Patient presents with    Diabetes       HPI   Stanley Faustin  is a 58 y.o. male here for follow up of his chronic conditions. Takes meds as directed, except the glyburide/metformin which he takes depending on how he feels. Sugars have been around 150s. Patient has no other new complaints/problems at this time.      Review of patient's allergies indicates:  No Known Allergies    Past Medical History:   Diagnosis Date    Cluster headaches     Coronary artery disease     s/p stent    Diabetes mellitus     Diabetes mellitus type II     Headache     High cholesterol     Hyperlipidemia     Hypertension     Myocardial infarction      Past Surgical History:   Procedure Laterality Date    COLONOSCOPY N/A 8/17/2015    Performed by Gary Arango MD at [facility] ENDO    CORONARY ANGIOPLASTY WITH STENT PLACEMENT      FUNCTIONAL ENDOSCOPIC SINUS SURGERY (FESS) USING COMPUTER-ASSISTED NAVIGATION Bilateral 6/26/2018    Procedure: SINUS SURGERY FUNCTIONAL ENDOSCOPIC WITH NAVIGATION;  Surgeon: Sina Cortez MD;  Location: [facility] OR;  Service: ENT;  Laterality: Bilateral;    left shoulder      RECONSTRUCTION OF NOSE Bilateral 6/26/2018    Procedure: RECONSTRUCTION-NASAL;  Surgeon: Sina Cortez MD;  Location: [facility] OR;  Service: ENT;  Laterality: Bilateral;  [employer]  RN PREOP 6/20/2018    RECONSTRUCTION-NASAL Bilateral 6/26/2018    Performed by [facility] MD at [facility] OR    SINUS SURGERY FUNCTIONAL ENDOSCOPIC WITH NAVIGATION Bilateral 6/26/2018    Performed by [facility]ez MD at [facility] OR    TONSILLECTOMY      TURBINATE CAUTERY RESECTION WITH DEBRIDER (CRISTINA. MAXILLARY & CRISTINA. ETHMOID) Bilateral 6/26/2018    Performed by [facility]ez MD at [facility] OR    TURBINATE RESECTION Bilateral 6/26/2018    Procedure: TURBINATE CAUTERY RESECTION WITH DEBRIDER (CRISTINA. MAXILLARY & CRISTINA. ETHMOID);  Surgeon: Sina Cortez MD;  Location: [facility] OR;  Service: ENT;   Laterality: Bilateral;    UVULOPALATOPHARYNGOPLASTY      for REJI     Family History   Problem Relation Age of Onset    Diabetes Mother     Diabetes Father     Heart disease Father     Mental illness Brother         suicide    Cancer Neg Hx         prostate or colon     Social History     Tobacco Use    Smoking status: Current Every Day Smoker     Packs/day: 0.75     Types: Cigarettes    Smokeless tobacco: Never Used    Tobacco comment:  x 32 yr.  Works at [employer]  Three kids.  Walks a lot at work.     Substance Use Topics    Alcohol use: Yes     Comment: only on occasion    Drug use: No        Review of Systems   Constitutional: Negative for chills, fever and malaise/fatigue.   HENT: Negative.    Respiratory: Negative.  Negative for cough and shortness of breath.    Cardiovascular: Negative.  Negative for chest pain.   Gastrointestinal: Negative.  Negative for abdominal pain, nausea and vomiting.   Genitourinary: Negative.    Neurological: Negative for weakness and headaches.   All other systems reviewed and are negative.    OBJECTIVE:     Vitals:    11/20/18 1305   BP: 94/72   Pulse: (!) 111   Temp: 98.5 °F (36.9 °C)     Body mass index is 29.54 kg/m².    Physical Exam   Constitutional: He is oriented to person, place, and time and well-developed, well-nourished, and in no distress. No distress.   HENT:   Head: Normocephalic and atraumatic.   Nose: Nose normal.   Eyes: Conjunctivae and EOM are normal.   Cardiovascular: Normal rate, regular rhythm and normal heart sounds. Exam reveals no gallop and no friction rub.   No murmur heard.  Pulmonary/Chest: Effort normal and breath sounds normal. No respiratory distress. He has no wheezes. He has no rales. He exhibits no tenderness.   Abdominal: Soft. Bowel sounds are normal. He exhibits no distension. There is no tenderness. There is no rebound.   Neurological: He is alert and oriented to person, place, and time.   Skin: Skin is warm. He  is not diaphoretic.     Protective Sensation (w/ 10 gram monofilament):  Right: Intact  Left: Intact    Visual Inspection:  Normal -  Bilateral    Pedal Pulses:   Right: Present  Left: Present    Posterior tibialis:   Right:Present  Left: Present      Old records were reviewed. Labs and/or images were independently reviewed.    ASSESSMENT     1. Type 2 diabetes mellitus with mild nonproliferative retinopathy, with long-term current use of insulin, macular edema presence unspecified, unspecified laterality    2. Hypertension associated with diabetes        PLAN:     Type 2 diabetes mellitus with mild nonproliferative retinopathy, with long-term current use of insulin, macular edema presence unspecified, unspecified laterality  -     glyBURIDE-metformin 5-500 mg (GLUCOVANCE) 5-500 mg Tab; Take 1 tablet by mouth 2 (two) times daily with meals.  Dispense: 180 tablet; Refill: 3  -     empagliflozin (JARDIANCE) 10 mg Tab; Take 10 mg by mouth once daily.  Dispense: 90 tablet; Refill: 3  -     Comprehensive metabolic panel; Future  -     Hemoglobin A1c; Future  -     Instructed patient to take daily glucose AM logs and to write them down to bring with on next visit. Advised patient to decrease intake of carbohydrates/simple sugars.         Hypertension associated with diabetes   -     Stable. Continue current regimen.    RTC PRN    More than 50% of the encounter was spent counseling patient about diabetes, in an outpatient setting. Total time spent counseling patient: 25.      Masood Khan MD  11/20/2018 1:23 PM

## 2019-02-04 DIAGNOSIS — Z79.4 TYPE 2 DIABETES MELLITUS WITH MILD NONPROLIFERATIVE RETINOPATHY, WITH LONG-TERM CURRENT USE OF INSULIN, MACULAR EDEMA PRESENCE UNSPECIFIED, UNSPECIFIED LATERALITY: ICD-10-CM

## 2019-02-04 DIAGNOSIS — S29.012A STRAIN OF RHOMBOID MUSCLE, INITIAL ENCOUNTER: ICD-10-CM

## 2019-02-04 DIAGNOSIS — E11.3299 TYPE 2 DIABETES MELLITUS WITH MILD NONPROLIFERATIVE RETINOPATHY, WITH LONG-TERM CURRENT USE OF INSULIN, MACULAR EDEMA PRESENCE UNSPECIFIED, UNSPECIFIED LATERALITY: ICD-10-CM

## 2019-02-04 RX ORDER — GLYBURIDE-METFORMIN HYDROCHLORIDE 5; 500 MG/1; MG/1
1 TABLET ORAL 2 TIMES DAILY WITH MEALS
Qty: 180 TABLET | Refills: 3
Start: 2019-02-04 | End: 2019-02-18 | Stop reason: SDUPTHER

## 2019-02-04 RX ORDER — DICLOFENAC SODIUM 10 MG/G
2 GEL TOPICAL 4 TIMES DAILY
Qty: 100 G | Refills: 0 | Status: SHIPPED | OUTPATIENT
Start: 2019-02-04 | End: 2019-02-25 | Stop reason: SDUPTHER

## 2019-02-18 DIAGNOSIS — Z79.4 TYPE 2 DIABETES MELLITUS WITH MILD NONPROLIFERATIVE RETINOPATHY, WITH LONG-TERM CURRENT USE OF INSULIN, MACULAR EDEMA PRESENCE UNSPECIFIED, UNSPECIFIED LATERALITY: ICD-10-CM

## 2019-02-18 DIAGNOSIS — E11.3299 TYPE 2 DIABETES MELLITUS WITH MILD NONPROLIFERATIVE RETINOPATHY, WITH LONG-TERM CURRENT USE OF INSULIN, MACULAR EDEMA PRESENCE UNSPECIFIED, UNSPECIFIED LATERALITY: ICD-10-CM

## 2019-02-18 RX ORDER — GLYBURIDE-METFORMIN HYDROCHLORIDE 5; 500 MG/1; MG/1
1 TABLET ORAL 2 TIMES DAILY WITH MEALS
Qty: 180 TABLET | Refills: 3
Start: 2019-02-18 | End: 2019-02-25 | Stop reason: SDUPTHER

## 2019-02-25 DIAGNOSIS — E11.3299 TYPE 2 DIABETES MELLITUS WITH MILD NONPROLIFERATIVE RETINOPATHY, WITH LONG-TERM CURRENT USE OF INSULIN, MACULAR EDEMA PRESENCE UNSPECIFIED, UNSPECIFIED LATERALITY: ICD-10-CM

## 2019-02-25 DIAGNOSIS — Z79.4 TYPE 2 DIABETES MELLITUS WITH MILD NONPROLIFERATIVE RETINOPATHY, WITH LONG-TERM CURRENT USE OF INSULIN, MACULAR EDEMA PRESENCE UNSPECIFIED, UNSPECIFIED LATERALITY: ICD-10-CM

## 2019-02-25 DIAGNOSIS — S29.012A STRAIN OF RHOMBOID MUSCLE, INITIAL ENCOUNTER: ICD-10-CM

## 2019-02-25 RX ORDER — DICLOFENAC SODIUM 10 MG/G
2 GEL TOPICAL 4 TIMES DAILY
Qty: 100 G | Refills: 0 | Status: SHIPPED | OUTPATIENT
Start: 2019-02-25 | End: 2019-03-07

## 2019-02-25 RX ORDER — GLYBURIDE-METFORMIN HYDROCHLORIDE 5; 500 MG/1; MG/1
1 TABLET ORAL 2 TIMES DAILY WITH MEALS
Qty: 180 TABLET | Refills: 3
Start: 2019-02-25 | End: 2019-04-22

## 2019-03-07 ENCOUNTER — TELEPHONE (OUTPATIENT)
Dept: FAMILY MEDICINE | Facility: CLINIC | Age: 59
End: 2019-03-07

## 2019-03-07 NOTE — TELEPHONE ENCOUNTER
[facility] notice of approval for pt's Diclofenac Sodium Topical Gel 1% authorization good from 3/1/2019 to 03/01/2022

## 2019-03-22 DIAGNOSIS — E11.9 TYPE 2 DIABETES MELLITUS WITHOUT COMPLICATION: ICD-10-CM

## 2019-04-07 LAB — HBA1C MFR BLD: 8.8 %

## 2019-04-22 ENCOUNTER — OFFICE VISIT (OUTPATIENT)
Dept: FAMILY MEDICINE | Facility: CLINIC | Age: 59
End: 2019-04-22
Payer: COMMERCIAL

## 2019-04-22 VITALS
DIASTOLIC BLOOD PRESSURE: 60 MMHG | TEMPERATURE: 98 F | HEART RATE: 76 BPM | OXYGEN SATURATION: 97 % | HEIGHT: 69 IN | WEIGHT: 188.38 LBS | SYSTOLIC BLOOD PRESSURE: 120 MMHG | BODY MASS INDEX: 27.9 KG/M2

## 2019-04-22 DIAGNOSIS — Z87.891 HISTORY OF TOBACCO ABUSE: ICD-10-CM

## 2019-04-22 DIAGNOSIS — Z95.1 HX OF CABG: ICD-10-CM

## 2019-04-22 DIAGNOSIS — I10 ESSENTIAL HYPERTENSION, BENIGN: ICD-10-CM

## 2019-04-22 PROCEDURE — 99999 PR PBB SHADOW E&M-EST. PATIENT-LVL IV: CPT | Mod: PBBFAC,,, | Performed by: FAMILY MEDICINE

## 2019-04-22 PROCEDURE — 99214 PR OFFICE/OUTPT VISIT, EST, LEVL IV, 30-39 MIN: ICD-10-PCS | Mod: S$GLB,,, | Performed by: FAMILY MEDICINE

## 2019-04-22 PROCEDURE — 99999 PR PBB SHADOW E&M-EST. PATIENT-LVL IV: ICD-10-PCS | Mod: PBBFAC,,, | Performed by: FAMILY MEDICINE

## 2019-04-22 PROCEDURE — 99214 OFFICE O/P EST MOD 30 MIN: CPT | Mod: S$GLB,,, | Performed by: FAMILY MEDICINE

## 2019-04-22 RX ORDER — OXYCODONE AND ACETAMINOPHEN 5; 325 MG/1; MG/1
1 TABLET ORAL
COMMUNITY
Start: 2019-04-18 | End: 2019-10-09

## 2019-04-22 RX ORDER — NITROGLYCERIN 0.4 MG/1
0.4 TABLET SUBLINGUAL EVERY 5 MIN PRN
Qty: 30 TABLET | Refills: 3 | Status: SHIPPED | OUTPATIENT
Start: 2019-04-22 | End: 2020-05-13

## 2019-04-22 RX ORDER — METOPROLOL TARTRATE 25 MG/1
12.5 TABLET, FILM COATED ORAL
COMMUNITY
Start: 2019-04-13 | End: 2020-05-19 | Stop reason: SDUPTHER

## 2019-04-22 RX ORDER — DICLOFENAC SODIUM 10 MG/G
2 GEL TOPICAL
COMMUNITY
Start: 2019-02-25 | End: 2022-04-18 | Stop reason: SDUPTHER

## 2019-04-22 RX ORDER — ATORVASTATIN CALCIUM 40 MG/1
40 TABLET, FILM COATED ORAL DAILY
Qty: 90 TABLET | Refills: 3 | Status: SHIPPED | OUTPATIENT
Start: 2019-04-22 | End: 2020-04-14

## 2019-04-22 RX ORDER — METFORMIN HYDROCHLORIDE 500 MG/1
2000 TABLET, EXTENDED RELEASE ORAL
Qty: 360 TABLET | Refills: 3
Start: 2019-04-22 | End: 2019-06-07 | Stop reason: SDUPTHER

## 2019-04-22 RX ORDER — IBUPROFEN 200 MG
1 TABLET ORAL
COMMUNITY
Start: 2019-04-18 | End: 2019-05-30

## 2019-04-22 NOTE — LETTER
April 22, 2019      [facility]  [address] [facility]  [place] [address]  Phone: [phone]  Fax: [phone]       Patient: Stanley Faustin   YOB: 1960  Date of Visit: 04/22/2019    To Whom It May Concern:    Emmie Faustin  was at [facility] on 04/22/2019 and is currently under my care. Given his recent bypass, we estimate that he can return to work around 3 months. If you have any questions or concerns, or if I can be of further assistance, please do not hesitate to contact me.    Sincerely,        Masood Khan MD

## 2019-04-23 NOTE — PROGRESS NOTES
[facility]  Progress Note    SUBJECTIVE:     Chief Complaint   Patient presents with    Follow-up       HPI   Stanley Faustin  is a 58 y.o. male here for hospital follow-up for MI, and s/p CABG x4 on 4/10/19 at [facility]. He has uncontrolled diabetes, but just recently stopped smoking. Doing well today, no chest pain, SOB. Takes meds as directed. Has f/u with cardiology.     Review of patient's allergies indicates:  No Known Allergies    Past Medical History:   Diagnosis Date    Cluster headaches     Coronary artery disease     s/p stent    Diabetes mellitus     Diabetes mellitus type II     Headache     High cholesterol     Hyperlipidemia     Hypertension     Myocardial infarction      Past Surgical History:   Procedure Laterality Date    COLONOSCOPY N/A 8/17/2015    Performed by [facility] at [facility]    CORONARY ANGIOPLASTY WITH STENT PLACEMENT      left shoulder      RECONSTRUCTION-NASAL Bilateral 6/26/2018    Performed by [facility] Cortez MD at [facility] OR    SINUS SURGERY FUNCTIONAL ENDOSCOPIC WITH NAVIGATION Bilateral 6/26/2018    Performed by [facility]ez MD at [facility] OR    TONSILLECTOMY      TURBINATE CAUTERY RESECTION WITH DEBRIDER (CRISTINA. MAXILLARY & CRISTINA. ETHMOID) Bilateral 6/26/2018    Performed by [facility] MD at [facility] OR    UVULOPALATOPHARYNGOPLASTY      for REJI     Family History   Problem Relation Age of Onset    Diabetes Mother     Diabetes Father     Heart disease Father     Mental illness Brother         suicide    Cancer Neg Hx         prostate or colon     Social History     Tobacco Use    Smoking status: Current Every Day Smoker     Packs/day: 0.75     Types: Cigarettes    Smokeless tobacco: Never Used    Tobacco comment:  x 32 yr.  Works at [employer].  Three kids.  Walks a lot at work.     Substance Use Topics    Alcohol use: Yes     Comment: only on occasion    Drug use: No        Review of Systems   Constitutional: Negative for  chills, fever and malaise/fatigue.   HENT: Negative.    Respiratory: Negative.  Negative for cough and shortness of breath.    Cardiovascular: Negative.  Negative for chest pain.   Gastrointestinal: Negative.  Negative for abdominal pain, nausea and vomiting.   Genitourinary: Negative.    Neurological: Negative for weakness and headaches.   All other systems reviewed and are negative.    OBJECTIVE:     Vitals:    04/22/19 1349   BP: 120/60   Pulse: 76   Temp: 98.3 °F (36.8 °C)     Body mass index is 27.82 kg/m².    Physical Exam   Constitutional: He is oriented to person, place, and time and well-developed, well-nourished, and in no distress. No distress.   HENT:   Head: Normocephalic and atraumatic.   Nose: Nose normal.   Eyes: Conjunctivae and EOM are normal.   Cardiovascular: Normal rate, regular rhythm and normal heart sounds. Exam reveals no gallop and no friction rub.   No murmur heard.  Pulmonary/Chest: Effort normal and breath sounds normal. No respiratory distress. He has no wheezes. He has no rales. He exhibits no tenderness.   Abdominal: Soft. Bowel sounds are normal. He exhibits no distension. There is no tenderness. There is no rebound.   Neurological: He is alert and oriented to person, place, and time.   Skin: Skin is warm. He is not diaphoretic.   Well healed surgical incisions.       Old records were reviewed. Labs and/or images were independently reviewed.    ASSESSMENT     1. Uncontrolled type 2 diabetes mellitus with stage 3 chronic kidney disease, without long-term current use of insulin    2. Hx of CABG    3. History of tobacco abuse (quit 5/6/19)    4. Essential hypertension, benign        PLAN:     Uncontrolled type 2 diabetes mellitus with stage 3 chronic kidney disease, without long-term current use of insulin  -     blood sugar diagnostic Strp; 1 strip by Misc.(Non-Drug; Combo Route) route 3 (three) times daily.  Dispense: 200 strip; Refill: 5  -     metFORMIN (GLUCOPHAGE-XR) 500 MG 24 hr  tablet; Take 4 tablets (2,000 mg total) by mouth daily with breakfast.  Dispense: 360 tablet; Refill: 3  -     Start dulaglutide (TRULICITY) 0.75 mg/0.5 mL PnIj; Inject 0.5 mLs (0.75 mg total) into the skin every 7 days.  Dispense: 4 Syringe; Refill: 3  -     Ambulatory Referral to Pharmacy Assistance  -     Start atorvastatin (LIPITOR) 40 MG tablet; Take 1 tablet (40 mg total) by mouth once daily.  Dispense: 90 tablet; Refill: 3  -     Needs high intensity statin due to recent MI. Stop lovastatin, start atorvastatin.   -     Instructed patient to take daily glucose AM logs and to write them down to bring with on next visit. Advised patient to decrease intake of carbohydrates/simple sugars.         Hx of CABG  -     nitroGLYCERIN (NITROSTAT) 0.4 MG SL tablet; Place 1 tablet (0.4 mg total) under the tongue every 5 (five) minutes as needed.  Dispense: 30 tablet; Refill: 3  -     atorvastatin (LIPITOR) 40 MG tablet; Take 1 tablet (40 mg total) by mouth once daily.  Dispense: 90 tablet; Refill: 3  -     F/u with cardiology at [facility] as scheduled.    History of tobacco abuse (quit 5/6/19)   -     Discussed importance of smoking cessation.    Essential hypertension, benign   -     Stable. Continue current regimen.      RTC KANDY Khan MD  04/22/2019 9:55 PM

## 2019-05-15 ENCOUNTER — PATIENT MESSAGE (OUTPATIENT)
Dept: FAMILY MEDICINE | Facility: CLINIC | Age: 59
End: 2019-05-15

## 2019-05-21 ENCOUNTER — OFFICE VISIT (OUTPATIENT)
Dept: FAMILY MEDICINE | Facility: CLINIC | Age: 59
End: 2019-05-21
Payer: COMMERCIAL

## 2019-05-21 VITALS
BODY MASS INDEX: 27.3 KG/M2 | WEIGHT: 184.31 LBS | OXYGEN SATURATION: 97 % | HEART RATE: 101 BPM | HEIGHT: 69 IN | DIASTOLIC BLOOD PRESSURE: 64 MMHG | SYSTOLIC BLOOD PRESSURE: 118 MMHG | TEMPERATURE: 98 F

## 2019-05-21 DIAGNOSIS — J06.9 VIRAL URI: Primary | ICD-10-CM

## 2019-05-21 DIAGNOSIS — I10 ESSENTIAL HYPERTENSION, BENIGN: ICD-10-CM

## 2019-05-21 PROCEDURE — 99214 OFFICE O/P EST MOD 30 MIN: CPT | Mod: S$GLB,,, | Performed by: FAMILY MEDICINE

## 2019-05-21 PROCEDURE — 99214 PR OFFICE/OUTPT VISIT, EST, LEVL IV, 30-39 MIN: ICD-10-PCS | Mod: S$GLB,,, | Performed by: FAMILY MEDICINE

## 2019-05-21 PROCEDURE — 99999 PR PBB SHADOW E&M-EST. PATIENT-LVL III: CPT | Mod: PBBFAC,,, | Performed by: FAMILY MEDICINE

## 2019-05-21 PROCEDURE — 99999 PR PBB SHADOW E&M-EST. PATIENT-LVL III: ICD-10-PCS | Mod: PBBFAC,,, | Performed by: FAMILY MEDICINE

## 2019-05-21 PROCEDURE — 94640 AIRWAY INHALATION TREATMENT: CPT | Mod: S$GLB,,, | Performed by: FAMILY MEDICINE

## 2019-05-21 PROCEDURE — 94640 PR INHAL RX, AIRWAY OBST/DX SPUTUM INDUCT: ICD-10-PCS | Mod: S$GLB,,, | Performed by: FAMILY MEDICINE

## 2019-05-21 RX ORDER — CODEINE PHOSPHATE AND GUAIFENESIN 10; 100 MG/5ML; MG/5ML
5 SOLUTION ORAL EVERY 8 HOURS PRN
Qty: 180 ML | Refills: 0 | Status: SHIPPED | OUTPATIENT
Start: 2019-05-21 | End: 2019-05-31

## 2019-05-21 RX ORDER — IPRATROPIUM BROMIDE AND ALBUTEROL SULFATE 2.5; .5 MG/3ML; MG/3ML
3 SOLUTION RESPIRATORY (INHALATION)
Status: COMPLETED | OUTPATIENT
Start: 2019-05-21 | End: 2019-05-21

## 2019-05-21 RX ORDER — LORATADINE 10 MG/1
10 TABLET ORAL DAILY PRN
Qty: 30 TABLET | Refills: 0 | Status: SHIPPED | OUTPATIENT
Start: 2019-05-21 | End: 2019-08-14

## 2019-05-21 RX ORDER — INSULIN DEGLUDEC 200 U/ML
10 INJECTION, SOLUTION SUBCUTANEOUS NIGHTLY
Qty: 3 ML | Refills: 0
Start: 2019-05-21 | End: 2019-10-09

## 2019-05-21 RX ADMIN — IPRATROPIUM BROMIDE AND ALBUTEROL SULFATE 3 ML: 2.5; .5 SOLUTION RESPIRATORY (INHALATION) at 11:05

## 2019-05-21 NOTE — PROGRESS NOTES
[facility]  Progress Note    SUBJECTIVE:     Chief Complaint   Patient presents with    Results       HPI   Stanley Faustin  is a 58 y.o. male here for follow-up of his chronic conditions. Sees endo for his diabetes. Takes meds as directed. Also has c/o cough, congestion, runny nose. Has tried otc meds without relief. No fevers, chills, SOB.  Patient has no other new complaints/problems at this time.      Review of patient's allergies indicates:  No Known Allergies    Past Medical History:   Diagnosis Date    Cluster headaches     Coronary artery disease     s/p stent    Diabetes mellitus     Diabetes mellitus type II     Headache     High cholesterol     Hyperlipidemia     Hypertension     Myocardial infarction      Past Surgical History:   Procedure Laterality Date    COLONOSCOPY N/A 2015    Performed [facility] END[facility]    CORONARY ANGIOPLASTY WITH STENT PLACEMENT      left shoulder      RECONSTRUCTION-NASAL Bilateral 2018    [facility]formed [facility] at [facility] OR    SINUS SURGERY FUNCTIONAL ENDOSCOPIC WITH NAVIGATION Bilateral 2018    Performed by [facility] at [facility] OR    TONSILLECTOMY      TURBINATE CAUTERY RESECTION WITH DEBRIDER (CRISTINA. MAXILLARY & CRISTINA. ETHMOID) Bilateral 2018    Performed by [facility] at [facility] OR    UVULOPALATOPHARYNGOPLASTY      for REJI     Family History   Problem Relation Age of Onset    Diabetes Mother     Diabetes Father     Heart disease Father     Mental illness Brother         suicide    Cancer Neg Hx         prostate or colon     Social History     Tobacco Use    Smoking status: Former Smoker     Packs/day: 0.75     Types: Cigarettes     Last attempt to quit: 2019     Years since quittin.1    Smokeless tobacco: Never Used    Tobacco comment:  x 32 yr.  Works at [employer].  Three kids.  Walks a lot at work.     Substance Use Topics    Alcohol use: Yes     Comment: only on  occasion    Drug use: No        Review of Systems   Constitutional: Negative for chills, fever and malaise/fatigue.   HENT: Positive for congestion.    Respiratory: Positive for cough and sputum production. Negative for shortness of breath.    Cardiovascular: Negative.  Negative for chest pain.   Gastrointestinal: Negative.  Negative for abdominal pain, nausea and vomiting.   Genitourinary: Negative.    Musculoskeletal: Negative for myalgias and neck pain.   Neurological: Negative for weakness and headaches.   All other systems reviewed and are negative.    OBJECTIVE:     Vitals:    05/21/19 1056   BP: 118/64   Pulse: 101   Temp: 98 °F (36.7 °C)     Body mass index is 27.22 kg/m².    Physical Exam   Constitutional: He is oriented to person, place, and time and well-developed, well-nourished, and in no distress. No distress.   HENT:   Head: Normocephalic and atraumatic.   Right Ear: External ear normal. Tympanic membrane is not perforated, not erythematous, not retracted and not bulging. No hemotympanum.   Left Ear: External ear normal. Tympanic membrane is not perforated, not erythematous, not retracted and not bulging. No hemotympanum.   Nose: Nose normal.   Mouth/Throat: Oropharynx is clear and moist. No oropharyngeal exudate.   Eyes: Conjunctivae and EOM are normal.   Cardiovascular: Normal rate, regular rhythm and normal heart sounds. Exam reveals no gallop and no friction rub.   No murmur heard.  Pulmonary/Chest: Effort normal. No respiratory distress. He has wheezes (slight wheeze, in lower lobes. ). He has no rales. He exhibits no tenderness.   Abdominal: Soft. Bowel sounds are normal. He exhibits no distension. There is no tenderness. There is no rebound.   Neurological: He is alert and oriented to person, place, and time.   Skin: Skin is warm. He is not diaphoretic.       Old records were reviewed. Labs and/or images were independently reviewed.    ASSESSMENT     1. Viral URI    2. Uncontrolled type 2  diabetes mellitus with stage 3 chronic kidney disease, without long-term current use of insulin    3. Essential hypertension, benign        PLAN:     Viral URI  -     guaifenesin-codeine 100-10 mg/5 ml (CHERATUSSIN AC)  mg/5 mL syrup; Take 5 mLs by mouth every 8 (eight) hours as needed for Cough or Congestion.  Dispense: 180 mL; Refill: 0  -     loratadine (CLARITIN) 10 mg tablet; Take 1 tablet (10 mg total) by mouth daily as needed for Allergies (or runny nose).  Dispense: 30 tablet; Refill: 0  -     albuterol-ipratropium 2.5 mg-0.5 mg/3 mL nebulizer solution 3 mL  -     OK to take tylenol as needed PRN fever. Take mucinex and or claritin to help decrease congestion. Educated patient to drink plenty of fluids and to take vitamin C to help boost immune system. Instructed patient to call or RTC if symptoms persist or worsen.    Uncontrolled type 2 diabetes mellitus with stage 3 chronic kidney disease, without long-term current use of insulin  -     insulin degludec (TRESIBA FLEXTOUCH U-200) 200 unit/mL (3 mL) InPn; Inject 10 Units into the skin every evening.  Dispense: 3 mL; Refill: 0  -     semaglutide (OZEMPIC) 0.25 mg or 0.5 mg(2 mg/1.5 mL) PnIj; Inject 0.25 mg into the skin every 7 days.  Dispense: 1.5 mL; Refill: 0  -     Instructed patient to take daily glucose AM logs and to write them down to bring with on next visit. Advised patient to decrease intake of carbohydrates/simple sugars.  -     F/u with endocrinology.         Essential hypertension, benign   -     Stable. Continue current regimen.      RTC PRN    Masood Khan MD  05/21/2019 11:42 AM

## 2019-06-07 DIAGNOSIS — E11.9 TYPE 2 DIABETES MELLITUS WITHOUT COMPLICATION, WITH LONG-TERM CURRENT USE OF INSULIN: ICD-10-CM

## 2019-06-07 DIAGNOSIS — Z79.4 TYPE 2 DIABETES MELLITUS WITHOUT COMPLICATION, WITH LONG-TERM CURRENT USE OF INSULIN: ICD-10-CM

## 2019-06-07 RX ORDER — METFORMIN HYDROCHLORIDE 500 MG/1
2000 TABLET, EXTENDED RELEASE ORAL
Qty: 360 TABLET | Refills: 3
Start: 2019-06-07 | End: 2019-08-14

## 2019-06-07 RX ORDER — LISINOPRIL 5 MG/1
5 TABLET ORAL DAILY
Qty: 90 TABLET | Refills: 3
Start: 2019-06-07 | End: 2019-10-09 | Stop reason: SDUPTHER

## 2019-06-14 DIAGNOSIS — E11.9 TYPE 2 DIABETES MELLITUS WITHOUT COMPLICATION: ICD-10-CM

## 2019-08-05 LAB
CHOLEST SERPL-MSCNC: 114 MG/DL (ref 0–200)
HDLC SERPL-MCNC: 46 MG/DL
LDLC SERPL CALC-MCNC: 42 MG/DL (ref 0–160)
TRIGL SERPL-MCNC: 130 MG/DL

## 2019-08-14 ENCOUNTER — OFFICE VISIT (OUTPATIENT)
Dept: FAMILY MEDICINE | Facility: CLINIC | Age: 59
End: 2019-08-14
Payer: COMMERCIAL

## 2019-08-14 VITALS
TEMPERATURE: 98 F | DIASTOLIC BLOOD PRESSURE: 64 MMHG | HEIGHT: 69 IN | OXYGEN SATURATION: 98 % | BODY MASS INDEX: 27.22 KG/M2 | WEIGHT: 183.75 LBS | SYSTOLIC BLOOD PRESSURE: 130 MMHG | HEART RATE: 83 BPM

## 2019-08-14 DIAGNOSIS — E11.65 UNCONTROLLED TYPE 2 DIABETES MELLITUS WITH HYPERGLYCEMIA: Primary | ICD-10-CM

## 2019-08-14 DIAGNOSIS — I10 ESSENTIAL HYPERTENSION, BENIGN: Chronic | ICD-10-CM

## 2019-08-14 PROBLEM — Z95.1 HX OF CABG: Chronic | Status: ACTIVE | Noted: 2019-04-22

## 2019-08-14 PROCEDURE — 99214 OFFICE O/P EST MOD 30 MIN: CPT | Mod: S$GLB,,, | Performed by: FAMILY MEDICINE

## 2019-08-14 PROCEDURE — 99214 PR OFFICE/OUTPT VISIT, EST, LEVL IV, 30-39 MIN: ICD-10-PCS | Mod: S$GLB,,, | Performed by: FAMILY MEDICINE

## 2019-08-14 PROCEDURE — 99999 PR PBB SHADOW E&M-EST. PATIENT-LVL III: CPT | Mod: PBBFAC,,, | Performed by: FAMILY MEDICINE

## 2019-08-14 PROCEDURE — 99999 PR PBB SHADOW E&M-EST. PATIENT-LVL III: ICD-10-PCS | Mod: PBBFAC,,, | Performed by: FAMILY MEDICINE

## 2019-08-14 NOTE — PROGRESS NOTES
"[facility]  Progress Note    SUBJECTIVE:     Chief Complaint   Patient presents with    Sinus Problem       HPI   Stanley Faustin  is a 59 y.o. male here for c/o having a "weird" smell in his nasal cavities. This has been going on for the past 2 weeks, slightly getting better. No fevers, chills, sinus tenderness. Feels normal otherwise. Doesn't use any nasal sprays. Also here for f/u of his chronic conditions. Patient has no other new complaints/problems at this time.      Review of patient's allergies indicates:  No Known Allergies    Past Medical History:   Diagnosis Date    Cluster headaches     Coronary artery disease     s/p stent    Diabetes mellitus     Diabetes mellitus type II     Headache     High cholesterol     Hyperlipidemia     Hypertension     Myocardial infarction      Past Surgical History:   Procedure Laterality Date    COLONOSCOPY N/A 2015    Performed by [facility] MD at [facility] ENDO    CORONARY ANGIOPLASTY WITH STENT PLACEMENT      left shoulder      RECONSTRUCTION-NASAL Bilateral 2018    Performed by [facility] MD at [facility] OR    SINUS SURGERY FUNCTIONAL ENDOSCOPIC WITH NAVIGATION Bilateral 2018    Performed by [facility]ez MD at [facility] OR    TONSILLECTOMY      TURBINATE CAUTERY RESECTION WITH DEBRIDER (CRISTINA. MAXILLARY & CRISTINA. ETHMOID) Bilateral 2018    Performed by [facility]ez MD at [facility] OR    UVULOPALATOPHARYNGOPLASTY      for REJI     Family History   Problem Relation Age of Onset    Diabetes Mother     Diabetes Father     Heart disease Father     Mental illness Brother         suicide    Cancer Neg Hx         prostate or colon     Social History     Tobacco Use    Smoking status: Former Smoker     Packs/day: 0.75     Types: Cigarettes     Last attempt to quit: 2019     Years since quittin.3    Smokeless tobacco: Never Used    Tobacco comment:  x 32 yr.  Works at [employer].  Three kids.  Walks a lot at " work.     Substance Use Topics    Alcohol use: Yes     Comment: only on occasion    Drug use: No        Review of Systems   Constitutional: Negative for chills, fever and malaise/fatigue.   HENT: Negative.    Respiratory: Negative.  Negative for cough and shortness of breath.    Cardiovascular: Negative.  Negative for chest pain.   Gastrointestinal: Negative.  Negative for abdominal pain, nausea and vomiting.   Genitourinary: Negative.    Neurological: Negative for weakness and headaches.   All other systems reviewed and are negative.    OBJECTIVE:     Vitals:    08/14/19 1100   BP: 130/64   Pulse: 83   Temp: 97.6 °F (36.4 °C)     Body mass index is 27.14 kg/m².    Physical Exam   Constitutional: He is oriented to person, place, and time and well-developed, well-nourished, and in no distress. No distress.   HENT:   Head: Normocephalic and atraumatic.   Nose: Nose normal.   Eyes: Conjunctivae and EOM are normal.   Cardiovascular: Normal rate, regular rhythm and normal heart sounds. Exam reveals no gallop and no friction rub.   No murmur heard.  Pulmonary/Chest: Effort normal and breath sounds normal. No respiratory distress. He has no wheezes. He has no rales. He exhibits no tenderness.   Abdominal: Soft. Bowel sounds are normal. He exhibits no distension. There is no tenderness. There is no rebound.   Neurological: He is alert and oriented to person, place, and time.   Skin: Skin is warm. He is not diaphoretic.       Old records were reviewed. Labs and/or images were independently reviewed.    ASSESSMENT     1. Uncontrolled type 2 diabetes mellitus with hyperglycemia    2. Essential hypertension, benign        PLAN:     Uncontrolled type 2 diabetes mellitus with hyperglycemia   -     Instructed patient to take daily glucose AM logs and to write them down to bring with on next visit. Advised patient to decrease intake of carbohydrates/simple sugars.   -     Has f/u with endo, last A1c one month ago was 8.1. Advised  to bring lab results.          Essential hypertension, benign   -     Stable. Continue current regimen.    Unsure what his nasal smell is, recommend Neti pot to rinse his nasal passages.   RTC PRJOSE E Khan MD  08/14/2019 12:04 PM

## 2019-08-21 ENCOUNTER — PATIENT OUTREACH (OUTPATIENT)
Dept: ADMINISTRATIVE | Facility: HOSPITAL | Age: 59
End: 2019-08-21

## 2019-08-22 LAB
LEFT EYE DM RETINOPATHY: POSITIVE
RIGHT EYE DM RETINOPATHY: POSITIVE

## 2019-09-04 ENCOUNTER — OFFICE VISIT (OUTPATIENT)
Dept: FAMILY MEDICINE | Facility: CLINIC | Age: 59
End: 2019-09-04
Payer: COMMERCIAL

## 2019-09-04 ENCOUNTER — TELEPHONE (OUTPATIENT)
Dept: ADMINISTRATIVE | Facility: HOSPITAL | Age: 59
End: 2019-09-04

## 2019-09-04 VITALS
BODY MASS INDEX: 28.01 KG/M2 | HEART RATE: 82 BPM | HEIGHT: 69 IN | OXYGEN SATURATION: 97 % | SYSTOLIC BLOOD PRESSURE: 114 MMHG | WEIGHT: 189.13 LBS | TEMPERATURE: 98 F | DIASTOLIC BLOOD PRESSURE: 76 MMHG | RESPIRATION RATE: 20 BRPM

## 2019-09-04 DIAGNOSIS — I25.83 CORONARY ARTERY DISEASE DUE TO LIPID RICH PLAQUE: Chronic | ICD-10-CM

## 2019-09-04 DIAGNOSIS — I25.10 CORONARY ARTERY DISEASE DUE TO LIPID RICH PLAQUE: Chronic | ICD-10-CM

## 2019-09-04 DIAGNOSIS — E11.3299 TYPE 2 DIABETES MELLITUS WITH MILD NONPROLIFERATIVE RETINOPATHY, WITH LONG-TERM CURRENT USE OF INSULIN, MACULAR EDEMA PRESENCE UNSPECIFIED, UNSPECIFIED LATERALITY: ICD-10-CM

## 2019-09-04 DIAGNOSIS — I10 ESSENTIAL HYPERTENSION, BENIGN: Chronic | ICD-10-CM

## 2019-09-04 DIAGNOSIS — Z79.4 TYPE 2 DIABETES MELLITUS WITH MILD NONPROLIFERATIVE RETINOPATHY, WITH LONG-TERM CURRENT USE OF INSULIN, MACULAR EDEMA PRESENCE UNSPECIFIED, UNSPECIFIED LATERALITY: ICD-10-CM

## 2019-09-04 DIAGNOSIS — Z95.1 HX OF CABG: Primary | Chronic | ICD-10-CM

## 2019-09-04 PROCEDURE — 99999 PR PBB SHADOW E&M-EST. PATIENT-LVL III: CPT | Mod: PBBFAC,,, | Performed by: FAMILY MEDICINE

## 2019-09-04 PROCEDURE — 99214 OFFICE O/P EST MOD 30 MIN: CPT | Mod: S$GLB,,, | Performed by: FAMILY MEDICINE

## 2019-09-04 PROCEDURE — 99999 PR PBB SHADOW E&M-EST. PATIENT-LVL III: ICD-10-PCS | Mod: PBBFAC,,, | Performed by: FAMILY MEDICINE

## 2019-09-04 PROCEDURE — 99214 PR OFFICE/OUTPT VISIT, EST, LEVL IV, 30-39 MIN: ICD-10-PCS | Mod: S$GLB,,, | Performed by: FAMILY MEDICINE

## 2019-09-04 RX ORDER — NATEGLINIDE 60 MG/1
60 TABLET ORAL
COMMUNITY
End: 2019-10-09 | Stop reason: DRUGHIGH

## 2019-09-04 NOTE — PROGRESS NOTES
[facility]  Progress Note    SUBJECTIVE:     Chief Complaint   Patient presents with    Heart Problem     f/u to return to work after heart surgery       HPI   Stanley Faustin  is a 59 y.o. male here for follow-up of his chronic conditions. Takes meds as directed. Ready to go back to work. Also cleared from a cardiology standpoint by his cardiologist. No chest pain, SOB. Patient has no other new complaints/problems at this time.      Review of patient's allergies indicates:  No Known Allergies    Past Medical History:   Diagnosis Date    Cluster headaches     Coronary artery disease     s/p stent    Diabetes mellitus     Diabetes mellitus type II     Headache     High cholesterol     Hyperlipidemia     Hypertension     Myocardial infarction      Past Surgical History:   Procedure Laterality Date    COLONOSCOPY N/A 2015    Performed by Gary Arango MD at [facility] ENDO    CORONARY ANGIOPLASTY WITH STENT PLACEMENT      left shoulder      MO CABG, ARTERY-VEIN, FOUR  2019    Coronary Artery Bypass, 4    RECONSTRUCTION-NASAL Bilateral 2018    Performed by [facility]ez [facility] at [facility] OR    SINUS SURGERY FUNCTIONAL ENDOSCOPIC WITH NAVIGATION Bilateral 2018    Performed by [facility] at [facility] OR    TONSILLECTOMY      TURBINATE CAUTERY RESECTION WITH DEBRIDER (CRISTINA. MAXILLARY & CRISTINA. ETHMOID) Bilateral 2018    Performed by [facility] at [facility] OR    UVULOPALATOPHARYNGOPLASTY      for REJI     Family History   Problem Relation Age of Onset    Diabetes Mother     Diabetes Father     Heart disease Father     Mental illness Brother         suicide    Cancer Neg Hx         prostate or colon     Social History     Tobacco Use    Smoking status: Former Smoker     Packs/day: 0.75     Types: Cigarettes     Last attempt to quit: 2019     Years since quittin.4    Smokeless tobacco: Never Used    Tobacco comment:  x 32 yr.  Works at [employer].   Three kids.  Walks a lot at work.     Substance Use Topics    Alcohol use: Yes     Comment: only on occasion    Drug use: No        Review of Systems   Constitutional: Negative for chills, fever and malaise/fatigue.   HENT: Negative.    Respiratory: Negative.  Negative for cough and shortness of breath.    Cardiovascular: Negative.  Negative for chest pain.   Gastrointestinal: Negative.  Negative for abdominal pain, nausea and vomiting.   Genitourinary: Negative.    Neurological: Negative for weakness and headaches.   All other systems reviewed and are negative.    OBJECTIVE:     Vitals:    09/04/19 1033   BP: 114/76   Pulse: 82   Resp: 20   Temp: 98.2 °F (36.8 °C)     Body mass index is 27.93 kg/m².    Physical Exam   Constitutional: He is oriented to person, place, and time and well-developed, well-nourished, and in no distress. No distress.   HENT:   Head: Normocephalic and atraumatic.   Nose: Nose normal.   Eyes: Conjunctivae and EOM are normal.   Cardiovascular: Normal rate, regular rhythm and normal heart sounds. Exam reveals no gallop and no friction rub.   No murmur heard.  Pulmonary/Chest: Effort normal and breath sounds normal. No respiratory distress. He has no wheezes. He has no rales. He exhibits no tenderness.   Abdominal: Soft. Bowel sounds are normal. He exhibits no distension. There is no tenderness. There is no rebound.   Neurological: He is alert and oriented to person, place, and time.   Skin: Skin is warm. He is not diaphoretic.       Old records were reviewed. Labs and/or images were independently reviewed.    ASSESSMENT     1. Hx of CABG    2. Type 2 diabetes mellitus with mild nonproliferative retinopathy, with long-term current use of insulin, macular edema presence unspecified, unspecified laterality    3. Essential hypertension, benign    4. Uncontrolled type 2 diabetes mellitus with stage 3 chronic kidney disease, without long-term current use of insulin    5. Coronary artery disease  due to lipid rich plaque        PLAN:     Hx of CABG/Coronary artery disease due to lipid rich plaque   -     Cleared by cardiology to go back to work. Filled out paperwork today to be able to return to work.     Type 2 diabetes mellitus with mild nonproliferative retinopathy, with long-term current use of insulin, macular edema presence unspecified, unspecified laterality. CKD 3   -     Instructed patient to take daily glucose AM logs and to write them down to bring with on next visit. Advised patient to decrease intake of carbohydrates/simple sugars.         Essential hypertension, benign   -     Stable. Continue current regimen.    RTC PRJOSE E Khan MD  09/04/2019 1:43 PM

## 2019-10-09 ENCOUNTER — OFFICE VISIT (OUTPATIENT)
Dept: FAMILY MEDICINE | Facility: CLINIC | Age: 59
End: 2019-10-09
Payer: COMMERCIAL

## 2019-10-09 VITALS
OXYGEN SATURATION: 97 % | HEIGHT: 69 IN | HEART RATE: 85 BPM | SYSTOLIC BLOOD PRESSURE: 134 MMHG | DIASTOLIC BLOOD PRESSURE: 66 MMHG | BODY MASS INDEX: 27.62 KG/M2 | TEMPERATURE: 98 F | WEIGHT: 186.5 LBS

## 2019-10-09 DIAGNOSIS — J34.9 SINUS PROBLEM: ICD-10-CM

## 2019-10-09 DIAGNOSIS — Z23 PNEUMOCOCCAL VACCINATION ADMINISTERED AT CURRENT VISIT: ICD-10-CM

## 2019-10-09 DIAGNOSIS — G47.00 INSOMNIA, UNSPECIFIED TYPE: ICD-10-CM

## 2019-10-09 DIAGNOSIS — I10 ESSENTIAL HYPERTENSION, BENIGN: Chronic | ICD-10-CM

## 2019-10-09 DIAGNOSIS — K62.89 RECTAL MASS: ICD-10-CM

## 2019-10-09 PROCEDURE — 90471 IMMUNIZATION ADMIN: CPT | Mod: S$GLB,,, | Performed by: FAMILY MEDICINE

## 2019-10-09 PROCEDURE — 90670 PNEUMOCOCCAL CONJUGATE VACCINE 13-VALENT LESS THAN 5YO & GREATER THAN: ICD-10-PCS | Mod: S$GLB,,, | Performed by: FAMILY MEDICINE

## 2019-10-09 PROCEDURE — 99999 PR PBB SHADOW E&M-EST. PATIENT-LVL IV: CPT | Mod: PBBFAC,,, | Performed by: FAMILY MEDICINE

## 2019-10-09 PROCEDURE — 90471 PNEUMOCOCCAL CONJUGATE VACCINE 13-VALENT LESS THAN 5YO & GREATER THAN: ICD-10-PCS | Mod: S$GLB,,, | Performed by: FAMILY MEDICINE

## 2019-10-09 PROCEDURE — 99214 OFFICE O/P EST MOD 30 MIN: CPT | Mod: 25,S$GLB,, | Performed by: FAMILY MEDICINE

## 2019-10-09 PROCEDURE — 99214 PR OFFICE/OUTPT VISIT, EST, LEVL IV, 30-39 MIN: ICD-10-PCS | Mod: 25,S$GLB,, | Performed by: FAMILY MEDICINE

## 2019-10-09 PROCEDURE — 99999 PR PBB SHADOW E&M-EST. PATIENT-LVL IV: ICD-10-PCS | Mod: PBBFAC,,, | Performed by: FAMILY MEDICINE

## 2019-10-09 PROCEDURE — 90670 PCV13 VACCINE IM: CPT | Mod: S$GLB,,, | Performed by: FAMILY MEDICINE

## 2019-10-09 RX ORDER — LISINOPRIL 5 MG/1
5 TABLET ORAL DAILY
Qty: 90 TABLET | Refills: 3 | Status: SHIPPED | OUTPATIENT
Start: 2019-10-09 | End: 2020-12-07

## 2019-10-09 RX ORDER — TEMAZEPAM 15 MG/1
15 CAPSULE ORAL NIGHTLY PRN
Qty: 30 CAPSULE | Refills: 0 | Status: SHIPPED | OUTPATIENT
Start: 2019-10-09 | End: 2019-11-08

## 2019-10-09 RX ORDER — NATEGLINIDE 120 MG/1
TABLET ORAL
Refills: 3 | COMMUNITY
Start: 2019-10-02 | End: 2020-09-08 | Stop reason: SDUPTHER

## 2019-10-09 RX ORDER — INSULIN DEGLUDEC 200 U/ML
13 INJECTION, SOLUTION SUBCUTANEOUS NIGHTLY
Qty: 3 ML | Refills: 0
Start: 2019-10-09 | End: 2020-09-16

## 2019-10-09 NOTE — PROGRESS NOTES
[facility]  Progress Note    SUBJECTIVE:     Chief Complaint   Patient presents with    Results       HPI   Stanley Faustin  is a 59 y.o. male here for multiple complaints.  1. F/u of his diabetes, who is being managed by Dr. Block (endo). Had his meds increased. He isn't sure why his sugars are up. Will try to cut out crystal light, and drink water only.  2. Hard rectal nodule. Noticed it couple weeks ago. Doesn't hurt, unsure if it is getting bigger or not. No fevers, chills or blood in stool.     Review of patient's allergies indicates:  No Known Allergies    Past Medical History:   Diagnosis Date    Cluster headaches     Coronary artery disease     s/p stent    Diabetes mellitus     Diabetes mellitus type II     Headache     High cholesterol     Hyperlipidemia     Hypertension     Myocardial infarction      Past Surgical History:   Procedure Laterality Date    CORONARY ANGIOPLASTY WITH STENT PLACEMENT      FUNCTIONAL ENDOSCOPIC SINUS SURGERY (FESS) USING COMPUTER-ASSISTED NAVIGATION Bilateral 6/26/2018    Procedure: SINUS SURGERY FUNCTIONAL ENDOSCOPIC WITH NAVIGATION;  Surgeon: Sina Cortez MD;  Location: [facility] OR;  Service: ENT;  Laterality: Bilateral;    left shoulder      CO CABG, ARTERY-VEIN, FOUR  04/12/2019    Coronary Artery Bypass, 4    RECONSTRUCTION OF NOSE Bilateral 6/26/2018    Procedure: RECONSTRUCTION-NASAL;  Surgeon: Sina Cortez MD;  Location: [facility] OR;  Service: ENT;  Laterality: Bilateral;  [facility]  RN PREOP 6/20/2018    TONSILLECTOMY      TURBINATE RESECTION Bilateral 6/26/2018    Procedure: TURBINATE CAUTERY RESECTION WITH DEBRIDER (CRISTINA. MAXILLARY & CRISTINA. ETHMOID);  Surgeon: Sina oCrtez MD;  Location: [facility] OR;  Service: ENT;  Laterality: Bilateral;    UVULOPALATOPHARYNGOPLASTY      for REJI     Family History   Problem Relation Age of Onset    Diabetes Mother     Diabetes Father     Heart disease Father     Mental illness Brother         suicide     Cancer Neg Hx         prostate or colon     Social History     Tobacco Use    Smoking status: Former Smoker     Packs/day: 0.75     Types: Cigarettes     Last attempt to quit: 2019     Years since quittin.5    Smokeless tobacco: Never Used    Tobacco comment:  x 32 yr.  Works at [employer].  Three kids.  Walks a lot at work.     Substance Use Topics    Alcohol use: Yes     Comment: only on occasion    Drug use: No        Review of Systems   Constitutional: Negative for chills, fever and malaise/fatigue.   HENT: Negative.    Respiratory: Negative.  Negative for cough and shortness of breath.    Cardiovascular: Negative.  Negative for chest pain.   Gastrointestinal: Negative.  Negative for abdominal pain, nausea and vomiting.   Genitourinary:        + rectal nodule   Neurological: Negative for weakness and headaches.   All other systems reviewed and are negative.    OBJECTIVE:     Vitals:    10/09/19 1108   BP: 134/66   Pulse: 85   Temp: 97.8 °F (36.6 °C)     Body mass index is 27.54 kg/m².    Physical Exam   Constitutional: He is oriented to person, place, and time and well-developed, well-nourished, and in no distress. No distress.   HENT:   Head: Normocephalic and atraumatic.   Nose: Nose normal.   Eyes: Conjunctivae and EOM are normal.   Cardiovascular: Normal rate, regular rhythm and normal heart sounds. Exam reveals no gallop and no friction rub.   No murmur heard.  Pulmonary/Chest: Effort normal and breath sounds normal. No respiratory distress. He has no wheezes. He has no rales. He exhibits no tenderness.   Abdominal: Soft. Bowel sounds are normal. He exhibits no distension. There is no tenderness. There is no rebound.   Genitourinary:   Genitourinary Comments: + hard dark, nodule at anterior rectal area, no erythema, abscess.    Neurological: He is alert and oriented to person, place, and time.   Skin: Skin is warm. He is not diaphoretic.     Protective Sensation (w/ 10 gram  monofilament):  Right: Intact  Left: Intact    Visual Inspection:  Normal -  Bilateral    Pedal Pulses:   Right: Present  Left: Present    Posterior tibialis:   Right:Present  Left: Present      Old records were reviewed. Labs and/or images were independently reviewed.    ASSESSMENT     1. Uncontrolled type 2 diabetes mellitus with stage 3 chronic kidney disease, without long-term current use of insulin    2. Rectal mass    3. Insomnia, unspecified type    4. Sinus problem    5. Pneumococcal vaccination administered at current visit    6. Essential hypertension, benign        PLAN:     Uncontrolled type 2 diabetes mellitus with stage 3 chronic kidney disease, without long-term current use of insulin  -     lisinopril (PRINIVIL,ZESTRIL) 5 MG tablet; Take 1 tablet (5 mg total) by mouth once daily.  Dispense: 90 tablet; Refill: 3  -     insulin degludec (TRESIBA FLEXTOUCH U-200) 200 unit/mL (3 mL) InPn; Inject 13 Units into the skin every evening.  Dispense: 3 mL; Refill: 0  -     empagliflozin-metformin (SYNJARDY XR) 12.5-1,000 mg TBph; Take 2 tablets by mouth once daily.  Dispense: 180 tablet; Refill: 3  -     Instructed patient to take daily glucose AM logs and to write them down to bring with on next visit. Advised patient to decrease intake of carbohydrates/simple sugars.  -     F/u with endocrinology for medication management.         Rectal Mass   -      Small 3 cm round size of stool was extracted from rectal crevice/pocket area. No evidence of infection. If persistent, consider colorectal referral. No hemorrhoids, abscess detected.      Insomnia, unspecified type  -     temazepam (RESTORIL) 15 mg Cap; Take 1 capsule (15 mg total) by mouth nightly as needed.  Dispense: 30 capsule; Refill: 0  -     Stable. Continue current regimen.  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    Sinus problem  -     Ambulatory referral to ENT    Pneumococcal vaccination administered at current visit  -      Pneumococcal Conjugate Vaccine (13 Valent) (IM)      RTC PRN    Masood Khan MD  10/09/2019 11:37 AM

## 2019-10-21 ENCOUNTER — PATIENT OUTREACH (OUTPATIENT)
Dept: ADMINISTRATIVE | Facility: OTHER | Age: 59
End: 2019-10-21

## 2019-10-23 ENCOUNTER — OFFICE VISIT (OUTPATIENT)
Dept: OTOLARYNGOLOGY | Facility: CLINIC | Age: 59
End: 2019-10-23
Payer: COMMERCIAL

## 2019-10-23 VITALS
SYSTOLIC BLOOD PRESSURE: 98 MMHG | DIASTOLIC BLOOD PRESSURE: 60 MMHG | BODY MASS INDEX: 27.62 KG/M2 | HEIGHT: 69 IN | WEIGHT: 186.5 LBS

## 2019-10-23 DIAGNOSIS — R05.9 COUGH: ICD-10-CM

## 2019-10-23 DIAGNOSIS — J32.4 CHRONIC PANSINUSITIS: Primary | ICD-10-CM

## 2019-10-23 DIAGNOSIS — J34.3 HYPERTROPHY OF BOTH INFERIOR NASAL TURBINATES: ICD-10-CM

## 2019-10-23 DIAGNOSIS — R43.9 DISTURBANCE OF SMELL: ICD-10-CM

## 2019-10-23 PROCEDURE — 31231 NASAL/SINUS ENDOSCOPY: ICD-10-PCS | Mod: S$GLB,,, | Performed by: OTOLARYNGOLOGY

## 2019-10-23 PROCEDURE — 99214 OFFICE O/P EST MOD 30 MIN: CPT | Mod: 25,S$GLB,, | Performed by: OTOLARYNGOLOGY

## 2019-10-23 PROCEDURE — 31231 NASAL ENDOSCOPY DX: CPT | Mod: S$GLB,,, | Performed by: OTOLARYNGOLOGY

## 2019-10-23 PROCEDURE — 99214 PR OFFICE/OUTPT VISIT, EST, LEVL IV, 30-39 MIN: ICD-10-PCS | Mod: 25,S$GLB,, | Performed by: OTOLARYNGOLOGY

## 2019-10-23 NOTE — LETTER
October 27, 2019      Masood Khan MD  [address] [facility]  [place] [id number]           [facility]olaryng[facility]  [address]   [place] [address]  Phone: [phone]          Patient: Stanley Faustin   MR Number: [id number]   YOB: 1960   Date of Visit: 10/23/2019       Dear Dr. Masood Khan:    Thank you for referring Stanley Faustin to me for evaluation. Attached you will find relevant portions of my assessment and plan of care.    If you have questions, please do not hesitate to call me. I look forward to following Stanley Faustin along with you.    Sincerely,    Clifton Angel MD    Enclosure  CC:  No Recipients    If you would like to receive this communication electronically, please contact [email or web address] or [phone] to request more information on [facility] access.    For providers and/or their staff who would like to refer a patient to [facility], please contact us through our one-stop-shop provider referral line, [facility], at [phone].    If you feel you have received this communication in error or would no longer like to receive these types of communications, please e-mail [email or web address]

## 2019-10-23 NOTE — PROGRESS NOTES
Subjective:      Stanley Faustin is a 59 y.o. male who was referred to me by Dr. Masood Khan in consultation for sinusitis. His main concern is a foul odor he experiences intermittently. Odor seems to occur specifically when head is in downward position and he feels like material may be draining from his sinuses, perhaps with ongoing sinus infection present. Some associated cough is present which is nonproductive. He has had sinusitis in the past which has been managed with topical nasal steroids and saline irrigations, as well as surgery performed by Dr. Cortez 6/26/18 (see excerpt from op note below). He had some improvement in his nasal congestion and nasal breathing following surgery, and has not had frequent episodes of sinusitis since the surgery aside from an episode treated with culture directed antibiotics about a year ago. He feels that he has post-nasal drip fairly constant. No discolored nasal drainage. No seasonal or environmental triggers for symptoms. No significant facial pain/pressure. No associated symptoms of fevers/chills, nausea/vomiting, or nasal obstruction. He is a diabetic with hypertension, ASCVD, and coronary artery disease with stent. Past history of smoking but quit.    No specific sinonasal therapy directed at this currently. The last course of antibiotics was around 11/20/18. Culture directed antibiotics for Pseudomonas sinusitis treated with Cipro based on culture results and with symptomatic improvement.  He does not regularly use nasal decongestant sprays.    He does not recall previously having allergy testing.    He denies a history of asthma.    He denies history of reflux symptoms which is not currently managed with medication.  He has not previously had an EGD.    He denies a diagnosis of obstructive sleep apnea.     He has previously had sinonasal surgery as above.    He does not recall a prior history of nasal trauma other than the sinonasal surgery.    SNOT-22 score =  28, NOSE score = 5%, ETDQ-7 score = 7     Prior sinus surgery with Dr. Cortez 6/26/18:  DATE OF PROCEDURE:  06/26/2018     PROCEDURE:Bilateral maxillary antrostomy with tissue removal, bilateral total ethmoidectomy with tissue removal, nasal septal reconstruction with turbinate cautery reduction.     PREOPERATIVE DIAGNOSIS:  Chronic bilateral maxillary sinusitis, chronic   bilateral ethmoid sinusitis, severely hypertrophic nasal turbinates, bilateral nasal   obstruction.     POSTOPERATIVE DIAGNOSIS:  Chronic bilateral maxillary sinusitis, chronic   bilateral ethmoid sinusitis, severely hypertrophic nasal turbinates, bilateral nasal   obstruction.    Past Medical History  He has a past medical history of Cluster headaches, Coronary artery disease, Diabetes mellitus, Diabetes mellitus type II, Headache, High cholesterol, Hyperlipidemia, Hypertension, and Myocardial infarction.    Past Surgical History  He has a past surgical history that includes Coronary angioplasty with stent; Tonsillectomy; Uvulopalatopharyngoplasty; Reconstruction of nose (Bilateral, 6/26/2018); Turbinate resection (Bilateral, 6/26/2018); Functional endoscopic sinus surgery (FESS) using computer-assisted navigation (Bilateral, 6/26/2018); left shoulder; and pr cabg, artery-vein, four (04/12/2019).    Family History  His family history includes Diabetes in his father and mother; Heart disease in his father; Mental illness in his brother.    Social History  He reports that he quit smoking about 6 months ago. His smoking use included cigarettes. He smoked 0.75 packs per day. He has never used smokeless tobacco. He reports that he drinks alcohol. He reports that he does not use drugs.    Allergies  He has No Known Allergies.    Medications   He has a current medication list which includes the following prescription(s): aspirin, atorvastatin, blood sugar diagnostic, blood-glucose meter, clotrimazole-betamethasone 1-0.05%, diclofenac sodium,  "empagliflozin-metformin, insulin degludec, lancets, lisinopril, metoprolol tartrate, nateglinide, nitroglycerin, semaglutide, and temazepam.    Review of Systems  Review of Systems   Constitutional: Negative.    HENT: Positive for postnasal drip.    Eyes: Negative.    Respiratory: Positive for cough.    Cardiovascular: Negative.         Heart Problems, Hypertension   Gastrointestinal: Negative.    Endocrine: Negative.         Diabetes   Genitourinary: Negative.    Musculoskeletal: Negative.    Skin: Negative.    Allergic/Immunologic: Negative.    Neurological: Negative.    Hematological: Negative.    Psychiatric/Behavioral: Negative.           Objective:     BP 98/60 (BP Location: Right arm, Patient Position: Sitting, BP Method: Medium (Manual))   Ht 5' 9" (1.753 m)   Wt 84.6 kg (186 lb 8.2 oz)   BMI 27.54 kg/m²        Constitutional:   Vital signs are normal. He appears well-developed and well-nourished. He appears alert. Normal speech.      Head:  Normocephalic and atraumatic. Salivary glands normal.  Facial strength is normal.      Ears:  Hearing normal to normal and whispered voice; external ear normal without scars, lesions, or masses; ear canal, tympanic membrane, and middle ear normal..   Right Ear: No drainage or tenderness. Tympanic membrane is not erythematous and not bulging.   Left Ear: No drainage or tenderness. Tympanic membrane is not erythematous and not bulging.     Nose:  Mucosal edema and septal deviation (Minimal septal deviation with good result of prior surgery) present. No polyps. No epistaxis. Turbinate hypertrophy.  Right sinus exhibits no maxillary sinus tenderness and no frontal sinus tenderness. Left sinus exhibits no maxillary sinus tenderness and no frontal sinus tenderness.     Mouth/Throat  Oropharynx clear and moist without lesions or asymmetry, normal uvula midline and lips, teeth, and gums normal.     Neck:  Neck normal without thyromegaly masses, asymmetry, normal tracheal " structure, crepitus, and tenderness, trachea normal, phonation normal, full range of motion with neck supple and no adenopathy.     Pulmonary/Chest:   Effort normal.     Psychiatric:   He has a normal mood and affect. His speech is normal and behavior is normal.     Neurological:   He is alert. He has neurological normal, alert and oriented. No cranial nerve deficit or sensory deficit.     Skin:   No abrasions, lacerations, lesions, or rashes.       Procedure  Nasal endoscopy performed.  See procedure note.    Data Reviewed    WBC (K/uL)   Date Value   06/11/2018 8.52     Eosinophil% (%)   Date Value   06/11/2018 2.3     Eos # (K/uL)   Date Value   06/11/2018 0.2     Platelets (K/uL)   Date Value   06/11/2018 220     Glucose (mg/dL)   Date Value   11/20/2018 212 (H)     No results found for: IGE    I independently reviewed the images of the CT sinuses dated 4/18/18. Pertinent findings include mucosal thickening throughout ethmoids, right maxillary partial opacification, nasal septal deviation to the left, and bilateral inferior turbinate hypertrophy/medialization of conchal bone of the inferior turbinates narrowing the nasal valve area.           No new imaging available since surgery on 6/26/18.       Assessment:     1. Chronic pansinusitis    2. Hypertrophy of both inferior nasal turbinates    3. Cough    4. Disturbance of smell         Plan:     I had a long discussion with the patient regarding his condition and the further workup and management options. He is found to have mucosal edema and significant residual ethmoid cells remaining on examination which limit complete evaluation for sources of ongoing sinusitis. No purulence identified on exam, not discolored drainage and facial pain to prompt treatment with antibiotics at this time. Given ongoing sinonasal inflammation and possible acute sinusitis on top of chronic rhinosinusitis, I have advised imaging to further evaluate with CT of sinuses. He has had no  imaging since his sinus surgery. Will review imaging once obtained and use to plan further treatment. He has no symptoms of reflux, but we discussed considering this as well should his sinuses not reveal cause for the foul odor, post-nasal drip, and cough symptoms he is experiencing. Recommend continued medical management with flonase and nasal saline irrigations.    Follow up in about 12 days (around 11/4/2019).

## 2019-10-27 NOTE — PROCEDURES
Nasal/sinus endoscopy  Date/Time: 10/23/2019 9:30 AM  Performed by: Clifton Angel MD  Authorized by: Clifton Angel MD     Consent Done?:  Yes (Verbal)  Anesthesia:     Local anesthetic:  4% Xylocaine spray with Harjit-Synephrine    Patient tolerance:  Patient tolerated the procedure well with no immediate complications  Nose:     Procedure Performed:  Nasal Endoscopy  External:      No external nasal deformity  Intranasal:      Mucosa no polyps     Mucosa ulcers not present     No mucosa lesions present     Enlarged turbinates     Septum gross deformity  Nasopharynx:      No mucosa lesions     Adenoids not present     Posterior choanae patent     Eustachian tube patent (Mild, s/p prior reduction)     Septum deviates to the left slightly - s/p prior septoplasty   Evidence of prior sinus surgery - limited ethmoidectomy bilaterally and maxillary antrostomy bilateral.   Mucosal edema is present throughout the ethmoid cavity and medial maxillary sinuses, right greater than left.

## 2019-10-30 ENCOUNTER — HOSPITAL ENCOUNTER (OUTPATIENT)
Dept: RADIOLOGY | Facility: HOSPITAL | Age: 59
Discharge: HOME | End: 2019-10-30
Attending: OTOLARYNGOLOGY
Payer: COMMERCIAL

## 2019-10-30 ENCOUNTER — PATIENT OUTREACH (OUTPATIENT)
Dept: ADMINISTRATIVE | Facility: OTHER | Age: 59
End: 2019-10-30

## 2019-10-30 DIAGNOSIS — J32.4 CHRONIC PANSINUSITIS: ICD-10-CM

## 2019-10-30 PROCEDURE — 70486 CT MEDTRONIC SINUSES WITHOUT: ICD-10-PCS | Mod: 26,,, | Performed by: RADIOLOGY

## 2019-10-30 PROCEDURE — 70486 CT MAXILLOFACIAL W/O DYE: CPT | Mod: 26,,, | Performed by: RADIOLOGY

## 2019-10-30 PROCEDURE — 70486 CT MAXILLOFACIAL W/O DYE: CPT | Mod: TC

## 2019-11-04 ENCOUNTER — OFFICE VISIT (OUTPATIENT)
Dept: OTOLARYNGOLOGY | Facility: CLINIC | Age: 59
End: 2019-11-04
Payer: COMMERCIAL

## 2019-11-04 VITALS
WEIGHT: 186 LBS | HEIGHT: 69 IN | BODY MASS INDEX: 27.55 KG/M2 | DIASTOLIC BLOOD PRESSURE: 84 MMHG | SYSTOLIC BLOOD PRESSURE: 122 MMHG

## 2019-11-04 DIAGNOSIS — Z98.890 STATUS POST FUNCTIONAL ENDOSCOPIC SINUS SURGERY (FESS): ICD-10-CM

## 2019-11-04 DIAGNOSIS — J32.4 CHRONIC PANSINUSITIS: Primary | ICD-10-CM

## 2019-11-04 DIAGNOSIS — H69.91 DYSFUNCTION OF RIGHT EUSTACHIAN TUBE: ICD-10-CM

## 2019-11-04 PROCEDURE — 99214 PR OFFICE/OUTPT VISIT, EST, LEVL IV, 30-39 MIN: ICD-10-PCS | Mod: S$GLB,,, | Performed by: OTOLARYNGOLOGY

## 2019-11-04 PROCEDURE — 99214 OFFICE O/P EST MOD 30 MIN: CPT | Mod: S$GLB,,, | Performed by: OTOLARYNGOLOGY

## 2019-11-04 NOTE — PROGRESS NOTES
Subjective:      Stanley Faustin is a 59 y.o. male who presents in follow-up regarding chronic sinusitis. I last saw the patient for initial evaluation on October 23, 2019.  Overall his sinonasal symptoms are largely unchanged since that time. History and description of symptoms obtained at his last evaluation and initial consultation on October 23, 2019 are copied below.  His only reported change in symptoms is some increased pressure and occasional pain of his right ear.  He denies any ear drainage.  He has not had any ear trauma.  He has no significant change in his hearing in this time. He has continued use his sinonasal medications as previously prescribed and instructed at his last visit, using saline nasal irrigations as well as daily Flonase nasal spray.  He obtained a CT scan of his sinuses which I had ordered for him at his last appointment and this was reviewed today.    History from prior evaluation on October 23, 2019:  Stanley Faustin is a 59 y.o. male who was referred to me by Dr. Masood Khan in consultation for sinusitis. His main concern is a foul odor he experiences intermittently. Odor seems to occur specifically when head is in downward position and he feels like material may be draining from his sinuses, perhaps with ongoing sinus infection present. Some associated cough is present which is nonproductive. He has had sinusitis in the past which has been managed with topical nasal steroids and saline irrigations, as well as surgery performed by Dr. Cortez 6/26/18 (see excerpt from op note below). He had some improvement in his nasal congestion and nasal breathing following surgery, and has not had frequent episodes of sinusitis since the surgery aside from an episode treated with culture directed antibiotics about a year ago. He feels that he has post-nasal drip fairly constant. No discolored nasal drainage. No seasonal or environmental triggers for symptoms. No significant facial  pain/pressure. No associated symptoms of fevers/chills, nausea/vomiting, or nasal obstruction. He is a diabetic with hypertension, ASCVD, and coronary artery disease with stent. Past history of smoking but quit.     No specific sinonasal therapy directed at this currently. The last course of antibiotics was around 11/20/18. Culture directed antibiotics for Pseudomonas sinusitis treated with Cipro based on culture results and with symptomatic improvement.  He does not regularly use nasal decongestant sprays.     He does not recall previously having allergy testing.     He denies a history of asthma.     He denies history of reflux symptoms which is not currently managed with medication.  He has not previously had an EGD.     He denies a diagnosis of obstructive sleep apnea.      He has previously had sinonasal surgery as above.     He does not recall a prior history of nasal trauma other than the sinonasal surgery.     SNOT-22 score = 28, NOSE score = 5%, ETDQ-7 score = 7      Prior sinus surgery with Dr. Cortez 6/26/18:  DATE OF PROCEDURE:  06/26/2018     PROCEDURE:Bilateral maxillary antrostomy with tissue removal, bilateral total ethmoidectomy with tissue removal, nasal septal reconstruction with turbinate cautery reduction.     PREOPERATIVE DIAGNOSIS:  Chronic bilateral maxillary sinusitis, chronic   bilateral ethmoid sinusitis, severely hypertrophic nasal turbinates, bilateral nasal   obstruction.     POSTOPERATIVE DIAGNOSIS:  Chronic bilateral maxillary sinusitis, chronic   bilateral ethmoid sinusitis, severely hypertrophic nasal turbinates, bilateral nasal   Obstruction.      Past Medical History  He has a past medical history of Cluster headaches, Coronary artery disease, Diabetes mellitus, Diabetes mellitus type II, Headache, High cholesterol, Hyperlipidemia, Hypertension, and Myocardial infarction.    Past Surgical History  He has a past surgical history that includes Coronary angioplasty with stent;  "Tonsillectomy; Uvulopalatopharyngoplasty; Reconstruction of nose (Bilateral, 6/26/2018); Turbinate resection (Bilateral, 6/26/2018); Functional endoscopic sinus surgery (FESS) using computer-assisted navigation (Bilateral, 6/26/2018); left shoulder; and pr cabg, artery-vein, four (04/12/2019).    Family History  His family history includes Diabetes in his father and mother; Heart disease in his father; Mental illness in his brother.    Social History  He reports that he quit smoking about 7 months ago. His smoking use included cigarettes. He smoked 0.75 packs per day. He has never used smokeless tobacco. He reports that he drinks alcohol. He reports that he does not use drugs.    Allergies  He has No Known Allergies.    Medications   He has a current medication list which includes the following prescription(s): aspirin, atorvastatin, blood sugar diagnostic, blood-glucose meter, clotrimazole-betamethasone 1-0.05%, diclofenac sodium, empagliflozin-metformin, insulin degludec, lancets, lisinopril, metoprolol tartrate, nateglinide, nitroglycerin, semaglutide, and temazepam.    Review of Systems  Review of Systems   Constitutional: Negative.    HENT: Positive for dental problem, ear pain and postnasal drip.    Eyes: Negative.    Respiratory: Positive for cough.    Cardiovascular: Negative.         Heart problems, high blood pressure   Gastrointestinal: Negative.    Endocrine: Negative.         Diabetes   Genitourinary: Negative.    Musculoskeletal: Negative.    Skin: Negative.    Allergic/Immunologic: Negative.    Neurological: Negative.    Hematological: Negative.    Psychiatric/Behavioral: Positive for sleep disturbance.       Objective:     /84 (BP Location: Right arm, Patient Position: Sitting)   Ht 5' 9" (1.753 m)   Wt 84.4 kg (186 lb)   BMI 27.47 kg/m²        Constitutional:   Vital signs are normal. He appears well-developed and well-nourished. He appears alert. Normal speech.      Head:  Normocephalic " and atraumatic. Salivary glands normal.  Facial strength is normal.      Ears:    Right Ear: No drainage, swelling or tenderness. No decreased hearing is noted.   Left Ear: No drainage, swelling or tenderness. No decreased hearing is noted.     Nose:  Mucosal edema and septal deviation (Minimal septal deviation with good result of prior surgery) present. No polyps. No epistaxis. Turbinate hypertrophy.  Right sinus exhibits no maxillary sinus tenderness and no frontal sinus tenderness. Left sinus exhibits no maxillary sinus tenderness and no frontal sinus tenderness.     Mouth/Throat  Oropharynx clear and moist without lesions or asymmetry, normal uvula midline and lips, teeth, and gums normal.     Neck:  Neck normal without thyromegaly masses, asymmetry, normal tracheal structure, crepitus, and tenderness, trachea normal, phonation normal, full range of motion with neck supple and no adenopathy.     Pulmonary/Chest:   Effort normal.     Psychiatric:   He has a normal mood and affect. His speech is normal and behavior is normal.     Neurological:   He is alert. He has neurological normal, alert and oriented. No cranial nerve deficit or sensory deficit.     Skin:   No abrasions, lacerations, lesions, or rashes.       Procedure    None    Data Reviewed    WBC (K/uL)   Date Value   06/11/2018 8.52     Eosinophil% (%)   Date Value   06/11/2018 2.3     Eos # (K/uL)   Date Value   06/11/2018 0.2     Platelets (K/uL)   Date Value   06/11/2018 220     Glucose (mg/dL)   Date Value   11/20/2018 212 (H)     No results found for: IGE    I independently reviewed the images of the CT sinuses dated 10/30/19. Pertinent findings include Scattered opacification or mucosal thickening throughout the paranasal sinuses as well as thickening of mucosa within the nasal cavity. There is evidence of pansinusitis sparing only the frontal sinuses which do not demonstrate opacifications.  There is some surgical change from past history of sinus  surgery which appears limited to maxillary antrostomies as well as limited ethmoidectomy (anterior).  The left maxillary sinus is nearly completely opacified and there is a rim of mucosal thickening in the right maxillary sinus..       Mucosal thickening throughout the ethmoid sinuses.  Mild septal deviation to the left which is decreased compared to prior preoperative scan with more severe leftward septal deviation.      Near complete opacification of the left maxillary sinus and mucosal thickening throughout the right maxillary sinus.  Appearance of retained uncinate process bilaterally with posteriorly placed maxillary antrostomy on the right and unclear maxillary antrostomy on the left given complete ostiomeatal complex opacification and appearance of residual uncinate process.  Surgical changes associated with history of ethmoidectomy.      Diffuse mucosal thickening throughout the ethmoid sinuses as well as within the sphenoid sinus.  Frontal sinus is without opacification aside from mucosal thickening within the frontal outflow tract at the superior anterior ethmoid sinuses.      Report from this CT study was also reviewed and copied below:    EXAMINATION:  CT [employer] SINUSES WITHOUT    CLINICAL HISTORY:  Sinusitis, recurring, possible surgery;  Chronic pansinusitis    TECHNIQUE:  Routine multiplanar CT [employer] sinus protocol performed without IV contrast.    COMPARISON:  04/18/2018    FINDINGS:  Prior sinus surgery with bilateral maxillary antrostomies.    The left maxillary sinus is near completely opacified, increased from the prior exam.  There is surrounding hyperostosis suggesting a chronic component.  Significant interval improved opacification of the right maxillary sinus with patent antral window.  The frontal sinuses are patent.  Minimal mucosal thickening the sphenoid sinus and residual anterior ethmoid air cells.  Frontoethmoidal recesses are patent.    Nasal septum is mildly deviated to the  left.  The extra paranasal soft tissues are grossly unremarkable.  The mastoid air cells and middle ears are patent.      Impression       Prior sinus surgery including bilateral maxillary antrostomies.    The right maxillary sinus mucosal thickening is significantly improved from the 04/18/2018 exam with patent antral window.  Interval worsening noted of left maxillary sinus, now near completely opacified.  The frontal, sphenoid, ethmoid sinuses remain essentially clear.     Past medical records were also reviewed and prior imaging was personally reviewed and interpreted in comparison to recent CT scan described above.  Findings of prior imaging were compared to recent imaging and described to the patient in our discussion of the imaging results today.  Prior imaging pre-operatively, CT sinuses dated 4/18/18. Pertinent findings include mucosal thickening throughout ethmoids, right maxillary partial opacification, nasal septal deviation to the left, and bilateral inferior turbinate hypertrophy/medialization of conchal bone of the inferior turbinates narrowing the nasal valve area.       Diffuse mucosal thickening within the ethmoid sinuses was present.  Severe leftward septal deviation compromising the left ostiomeatal complex.       Right maxillary sinus near complete opacification with minimal mucosal thickening within the left maxillary sinus.  A pattern approximately ops it to new findings on most recent CT scan from October 30, 2019.    Assessment:     1. Chronic pansinusitis    2. Status post functional endoscopic sinus surgery (FESS)    3. Dysfunction of right eustachian tube         Plan:     I had a long discussion with the patient and his wife regarding his condition and the further workup and management options. After discussion of options for management the patient wishes to proceed with sinonasal surgery to address his chronic rhinosinusitis which has been refractory to appropriate medical therapy and  recalcitrant to prior surgical intervention.  I have advised revision sinonasal surgery as indicated for his refractory sinusitis that is evident both by diagnostic nasal endoscopy with persistent inflammation present as well as radiographic findings on recent CT scan consistent with chronic sinusitis sparing only the frontal sinuses, with near complete opacification of the left maxillary sinus.  We discussed the risks and benefits of revision endoscopic sinus surgery which would include bilateral total ethmoidectomy, bilateral revision maxillary antrostomy, bilateral sphenoidotomy, with possible additional procedures of revision septoplasty as well as inferior turbinate partial resection and/or outfracture.  All of the patient and his wife's questions were answered today regarding these procedures, anticipated postoperative course, postoperative follow-up, and recommendations for activity restrictions and anticipated need for time off of work following these procedures.  They understand the need for ongoing medical management and the intent of surgery not only to promote drainage of the sinuses but to allow greater access for topical therapies and irrigations postoperatively.  They would like to arrange surgery likely for the month of January and will follow up for preoperative assessment and planning prior to then.  He will continue with current medical management as previously prescribed and contact us for additional refills as needed.  I have encouraged him to call for any questions or concerns in the meantime.    Follow up in about 6 weeks (around 12/16/2019).

## 2019-11-04 NOTE — PATIENT INSTRUCTIONS
"Information and instructions from your visit with me today:    · Start using fluticasone nasal spray:    This medication is the same as the Flonase brand nasal spray. Use this medication as instructed on the prescription, 2 sprays on each side of your nose once daily. You need to use this medication every day regardless of symptoms, as it takes time to work and get the benefits. It does not work on an "as needed" basis like taking a decongestant. Place the tip of the medication bottle in your nose and aim slightly up and out on each side to get medication high and deep into your nose and sinuses, and not have it all deposit in the very front of your nose. You can imagine aiming towards the back of your eyeball on each side for this, as opposed to straight back to the center of your nose and head.     · Start nasal irrigations with saline solution:    SALINE SINUS RINSE ([employer] brand): You should do a full bottle, half on one side of your nose and half on the other, 1-2 times per day (or more if able to, you cannot do this too much). Follow the instructions on the box: mix the salt packet with clean water (bottle, previously boiled, distilled, etc -- not tap water) to the line on the bottle to make the irrigation.      · Do not use nasal decongestant sprays such as Afrin or similar products.    It was nice meeting you today, and I look forward to helping you feel better soon. Please don't hesitate to call if you have any other questions or concerns, or if I can be of any assistance in the meantime.       Clifton Angel    [facility]    Phone  [phone]    Fax      [phone]        Clifton Angel MD  Rhinology, Sinus, and Skull Base Surgery  Department of Otorhinolaryngology        "

## 2019-12-02 ENCOUNTER — LAB VISIT (OUTPATIENT)
Dept: LAB | Facility: HOSPITAL | Age: 59
End: 2019-12-02
Attending: FAMILY MEDICINE
Payer: COMMERCIAL

## 2019-12-02 ENCOUNTER — OFFICE VISIT (OUTPATIENT)
Dept: FAMILY MEDICINE | Facility: CLINIC | Age: 59
End: 2019-12-02
Payer: COMMERCIAL

## 2019-12-02 VITALS
OXYGEN SATURATION: 99 % | SYSTOLIC BLOOD PRESSURE: 110 MMHG | HEIGHT: 69 IN | DIASTOLIC BLOOD PRESSURE: 83 MMHG | WEIGHT: 193.31 LBS | TEMPERATURE: 98 F | BODY MASS INDEX: 28.63 KG/M2 | HEART RATE: 89 BPM

## 2019-12-02 DIAGNOSIS — Z87.891 HISTORY OF TOBACCO ABUSE: ICD-10-CM

## 2019-12-02 DIAGNOSIS — I10 ESSENTIAL HYPERTENSION, BENIGN: Chronic | ICD-10-CM

## 2019-12-02 DIAGNOSIS — N52.9 ERECTILE DYSFUNCTION, UNSPECIFIED ERECTILE DYSFUNCTION TYPE: ICD-10-CM

## 2019-12-02 LAB
ALBUMIN SERPL BCP-MCNC: 4.1 G/DL (ref 3.5–5.2)
ALP SERPL-CCNC: 82 U/L (ref 55–135)
ALT SERPL W/O P-5'-P-CCNC: 41 U/L (ref 10–44)
ANION GAP SERPL CALC-SCNC: 9 MMOL/L (ref 8–16)
AST SERPL-CCNC: 22 U/L (ref 10–40)
BILIRUB SERPL-MCNC: 1 MG/DL (ref 0.1–1)
BUN SERPL-MCNC: 12 MG/DL (ref 6–20)
CALCIUM SERPL-MCNC: 10.1 MG/DL (ref 8.7–10.5)
CHLORIDE SERPL-SCNC: 101 MMOL/L (ref 95–110)
CHOLEST SERPL-MCNC: 155 MG/DL (ref 120–199)
CHOLEST/HDLC SERPL: 3.1 {RATIO} (ref 2–5)
CO2 SERPL-SCNC: 27 MMOL/L (ref 23–29)
CREAT SERPL-MCNC: 1.3 MG/DL (ref 0.5–1.4)
EST. GFR  (AFRICAN AMERICAN): >60 ML/MIN/1.73 M^2
EST. GFR  (NON AFRICAN AMERICAN): >60 ML/MIN/1.73 M^2
ESTIMATED AVG GLUCOSE: 209 MG/DL (ref 68–131)
GLUCOSE SERPL-MCNC: 212 MG/DL (ref 70–110)
HBA1C MFR BLD HPLC: 8.9 % (ref 4–5.6)
HDLC SERPL-MCNC: 50 MG/DL (ref 40–75)
HDLC SERPL: 32.3 % (ref 20–50)
LDLC SERPL CALC-MCNC: 76.4 MG/DL (ref 63–159)
NONHDLC SERPL-MCNC: 105 MG/DL
POTASSIUM SERPL-SCNC: 4.8 MMOL/L (ref 3.5–5.1)
PROT SERPL-MCNC: 7.6 G/DL (ref 6–8.4)
SODIUM SERPL-SCNC: 137 MMOL/L (ref 136–145)
TRIGL SERPL-MCNC: 143 MG/DL (ref 30–150)

## 2019-12-02 PROCEDURE — 80053 COMPREHEN METABOLIC PANEL: CPT

## 2019-12-02 PROCEDURE — 99214 OFFICE O/P EST MOD 30 MIN: CPT | Mod: S$GLB,,, | Performed by: FAMILY MEDICINE

## 2019-12-02 PROCEDURE — 36415 COLL VENOUS BLD VENIPUNCTURE: CPT | Mod: PO

## 2019-12-02 PROCEDURE — 83036 HEMOGLOBIN GLYCOSYLATED A1C: CPT

## 2019-12-02 PROCEDURE — 99999 PR PBB SHADOW E&M-EST. PATIENT-LVL IV: CPT | Mod: PBBFAC,,, | Performed by: FAMILY MEDICINE

## 2019-12-02 PROCEDURE — 99214 PR OFFICE/OUTPT VISIT, EST, LEVL IV, 30-39 MIN: ICD-10-PCS | Mod: S$GLB,,, | Performed by: FAMILY MEDICINE

## 2019-12-02 PROCEDURE — 80061 LIPID PANEL: CPT

## 2019-12-02 PROCEDURE — 99999 PR PBB SHADOW E&M-EST. PATIENT-LVL IV: ICD-10-PCS | Mod: PBBFAC,,, | Performed by: FAMILY MEDICINE

## 2019-12-02 RX ORDER — ASPIRIN/CALCIUM CARB/MAGNESIUM 325 MG
4 TABLET ORAL
Qty: 216 LOZENGE | Refills: 3 | Status: SHIPPED | OUTPATIENT
Start: 2019-12-02 | End: 2021-11-30

## 2019-12-02 RX ORDER — SILDENAFIL 100 MG/1
100 TABLET, FILM COATED ORAL DAILY PRN
Qty: 30 TABLET | Refills: 1 | Status: SHIPPED | OUTPATIENT
Start: 2019-12-02 | End: 2021-04-14

## 2019-12-02 RX ORDER — METFORMIN HYDROCHLORIDE 1000 MG/1
1000 TABLET ORAL 2 TIMES DAILY WITH MEALS
Qty: 180 TABLET | Refills: 3 | Status: SHIPPED | OUTPATIENT
Start: 2019-12-02 | End: 2020-11-10

## 2019-12-02 NOTE — PROGRESS NOTES
[facility]  Progress Note    SUBJECTIVE:     Chief Complaint   Patient presents with    Diabetes       HPI   Stanley Faustin  is a 59 y.o. male here for follow-up of his diabetes. Takes meds as directed sugars still over 200s. Patient has no other new complaints/problems at this time.      Review of patient's allergies indicates:  No Known Allergies    Past Medical History:   Diagnosis Date    Cluster headaches     Coronary artery disease     s/p stent    Diabetes mellitus     Diabetes mellitus type II     Headache     High cholesterol     Hyperlipidemia     Hypertension     Myocardial infarction      Past Surgical History:   Procedure Laterality Date    CORONARY ANGIOPLASTY WITH STENT PLACEMENT      FUNCTIONAL ENDOSCOPIC SINUS SURGERY (FESS) USING COMPUTER-ASSISTED NAVIGATION Bilateral 2018    Procedure: SINUS SURGERY FUNCTIONAL ENDOSCOPIC WITH NAVIGATION;  Surgeon: Sina Cortez MD;  Location: [facility] OR;  Service: ENT;  Laterality: Bilateral;    left shoulder      [place] CABG, ARTERY-VEIN, FOUR  2019    Coronary Artery Bypass, 4    RECONSTRUCTION OF NOSE Bilateral 2018    Procedure: RECONSTRUCTION-NASAL;  Surgeon: Sina Cortez MD;  Location: [facility] OR;  Service: ENT;  Laterality: Bilateral;  [facility]  RN PREOP 2018    TONSILLECTOMY      TURBINATE RESECTION Bilateral 2018    Procedure: TURBINATE CAUTERY RESECTION WITH DEBRIDER (CRISTINA. MAXILLARY & CRISTINA. ETHMOID);  Surgeon: Sina Cortez MD;  Location: [facility] OR;  Service: ENT;  Laterality: Bilateral;    UVULOPALATOPHARYNGOPLASTY      for REJI     Family History   Problem Relation Age of Onset    Diabetes Mother     Diabetes Father     Heart disease Father     Mental illness Brother         suicide    Cancer Neg Hx         prostate or colon     Social History     Tobacco Use    Smoking status: Former Smoker     Packs/day: 0.75     Types: Cigarettes     Last attempt to quit: 2019     Years since quittin.6     Smokeless tobacco: Never Used    Tobacco comment:  x 34 yr.  Works at [employer] [place].  Three kids.  Walks a lot at work.     Substance Use Topics    Alcohol use: Yes     Comment: only on occasion    Drug use: No        Review of Systems   Constitutional: Negative for chills, fever and malaise/fatigue.   HENT: Negative.    Respiratory: Negative.  Negative for cough and shortness of breath.    Cardiovascular: Negative.  Negative for chest pain.   Gastrointestinal: Negative.  Negative for abdominal pain, nausea and vomiting.   Genitourinary: Negative.    Neurological: Negative for weakness and headaches.   All other systems reviewed and are negative.    OBJECTIVE:     Vitals:    12/02/19 1029   BP: 110/83   Pulse: 89   Temp: 98.2 °F (36.8 °C)     Body mass index is 28.55 kg/m².    Physical Exam   Constitutional: He is oriented to person, place, and time and well-developed, well-nourished, and in no distress. No distress.   HENT:   Head: Normocephalic and atraumatic.   Nose: Nose normal.   Eyes: Conjunctivae and EOM are normal.   Cardiovascular: Normal rate, regular rhythm and normal heart sounds. Exam reveals no gallop and no friction rub.   No murmur heard.  Pulmonary/Chest: Effort normal and breath sounds normal. No respiratory distress. He has no wheezes. He has no rales. He exhibits no tenderness.   Abdominal: Soft. Bowel sounds are normal. He exhibits no distension. There is no tenderness. There is no rebound.   Neurological: He is alert and oriented to person, place, and time.   Skin: Skin is warm. He is not diaphoretic.       Old records were reviewed. Labs and/or images were independently reviewed.    ASSESSMENT     1. Uncontrolled type 2 diabetes mellitus with stage 3 chronic kidney disease, without long-term current use of insulin    2. Essential hypertension, benign    3. History of tobacco abuse (quit 5/6/19)    4. Erectile dysfunction, unspecified erectile dysfunction type        PLAN:      Uncontrolled type 2 diabetes mellitus with stage 3 chronic kidney disease, without long-term current use of insulin  -     Increase semaglutide (OZEMPIC) 0.25 mg or 0.5 mg(2 mg/1.5 mL) PnIj; Inject 0.5 mg into the skin every 7 days.  Dispense: 1.5 mL; Refill: 0  -     empagliflozin (JARDIANCE) 25 mg Tab; Take 25 mg by mouth once daily.  Dispense: 90 tablet; Refill: 3  -     metFORMIN (GLUCOPHAGE) 1000 MG tablet; Take 1 tablet (1,000 mg total) by mouth 2 (two) times daily with meals.  Dispense: 180 tablet; Refill: 3  -     Comprehensive metabolic panel; Future  -     Hemoglobin A1c; Future  -     Lipid panel; Future  -     Will stop combo pill and give separately. Increase ozempic to 0.5 mg once a week.     Essential hypertension, benign   -     Stable. Continue current regimen.    History of tobacco abuse (quit 5/6/19)  -     nicotine polacrilex 4 MG Lozg; Take 1 lozenge (4 mg total) by mouth every 2 (two) hours as needed.  Dispense: 216 lozenge; Refill: 3    Erectile dysfunction, unspecified erectile dysfunction type  -     sildenafil (VIAGRA) 100 MG tablet; Take 1 tablet (100 mg total) by mouth daily as needed for Erectile Dysfunction.  Dispense: 30 tablet; Refill: 1      RTC rosita Khan MD  12/02/2019 10:51 AM

## 2019-12-05 ENCOUNTER — TELEPHONE (OUTPATIENT)
Dept: OTOLARYNGOLOGY | Facility: CLINIC | Age: 59
End: 2019-12-05

## 2019-12-05 NOTE — TELEPHONE ENCOUNTER
Patient is calling to set up preop appt with the office. Scheduled for 1/20/20. Would like you to place orders in the computer for surgery on 2/11/20. Thanks Michela

## 2020-01-16 ENCOUNTER — PATIENT OUTREACH (OUTPATIENT)
Dept: ADMINISTRATIVE | Facility: OTHER | Age: 60
End: 2020-01-16

## 2020-01-20 ENCOUNTER — OFFICE VISIT (OUTPATIENT)
Dept: OTOLARYNGOLOGY | Facility: CLINIC | Age: 60
End: 2020-01-20
Payer: COMMERCIAL

## 2020-01-20 VITALS
BODY MASS INDEX: 29.14 KG/M2 | WEIGHT: 196.75 LBS | HEIGHT: 69 IN | DIASTOLIC BLOOD PRESSURE: 70 MMHG | SYSTOLIC BLOOD PRESSURE: 100 MMHG

## 2020-01-20 DIAGNOSIS — J32.4 CHRONIC PANSINUSITIS: Primary | ICD-10-CM

## 2020-01-20 DIAGNOSIS — Z98.890 STATUS POST FUNCTIONAL ENDOSCOPIC SINUS SURGERY (FESS): ICD-10-CM

## 2020-01-20 DIAGNOSIS — J34.89 NASAL OBSTRUCTION: ICD-10-CM

## 2020-01-20 DIAGNOSIS — R43.9 DISTURBANCE OF SMELL: ICD-10-CM

## 2020-01-20 PROCEDURE — 99213 PR OFFICE/OUTPT VISIT, EST, LEVL III, 20-29 MIN: ICD-10-PCS | Mod: S$GLB,,, | Performed by: OTOLARYNGOLOGY

## 2020-01-20 PROCEDURE — 99213 OFFICE O/P EST LOW 20 MIN: CPT | Mod: S$GLB,,, | Performed by: OTOLARYNGOLOGY

## 2020-01-20 RX ORDER — DEXAMETHASONE SODIUM PHOSPHATE 4 MG/ML
8 INJECTION, SOLUTION INTRA-ARTICULAR; INTRALESIONAL; INTRAMUSCULAR; INTRAVENOUS; SOFT TISSUE
Status: CANCELLED | OUTPATIENT
Start: 2020-01-20

## 2020-01-20 RX ORDER — LIDOCAINE HYDROCHLORIDE 10 MG/ML
1 INJECTION, SOLUTION EPIDURAL; INFILTRATION; INTRACAUDAL; PERINEURAL ONCE
Status: CANCELLED | OUTPATIENT
Start: 2020-01-20 | End: 2020-01-20

## 2020-01-20 RX ORDER — OXYMETAZOLINE HCL 0.05 %
2 SPRAY, NON-AEROSOL (ML) NASAL
Status: CANCELLED | OUTPATIENT
Start: 2020-01-20

## 2020-01-20 NOTE — H&P (VIEW-ONLY)
Subjective:      Stanley Faustin is a 59 y.o. male who presents in follow-up regarding chronic sinusitis. I last saw the patient 11/4/19. Overall his sinonasal symptoms are unchanged since that time. History and description of symptoms obtained at his last evaluation are as below. He has continued with medical management with no benefits appreciated from this. No acute worsening or new issues. No new treatments in the interim since last evaluation. He is interested in proceeding with surgery which we had discussed previously. He is a diabetic with hypertension, ASCVD, and coronary artery disease with stent. Past history of smoking but quit.    History from prior evaluation on November 4, 2019:  Stanley Faustin is a 59 y.o. male who presents in follow-up regarding chronic sinusitis. I last saw the patient for initial evaluation on October 23, 2019.  Overall his sinonasal symptoms are largely unchanged since that time. History and description of symptoms obtained at his last evaluation and initial consultation on October 23, 2019 are copied below.  His only reported change in symptoms is some increased pressure and occasional pain of his right ear.  He denies any ear drainage.  He has not had any ear trauma.  He has no significant change in his hearing in this time. He has continued use his sinonasal medications as previously prescribed and instructed at his last visit, using saline nasal irrigations as well as daily Flonase nasal spray.  He obtained a CT scan of his sinuses which I had ordered for him at his last appointment and this was reviewed today.     History from prior initial evaluation on October 23, 2019:  Stanley Faustin is a 59 y.o. male who was referred to me by Dr. Masood Khan in consultation for sinusitis. His main concern is a foul odor he experiences intermittently. Odor seems to occur specifically when head is in downward position and he feels like material may be draining from his  sinuses, perhaps with ongoing sinus infection present. Some associated cough is present which is nonproductive. He has had sinusitis in the past which has been managed with topical nasal steroids and saline irrigations, as well as surgery performed by Dr. Cortez 6/26/18 (see excerpt from op note below). He had some improvement in his nasal congestion and nasal breathing following surgery, and has not had frequent episodes of sinusitis since the surgery aside from an episode treated with culture directed antibiotics about a year ago. He feels that he has post-nasal drip fairly constant. No discolored nasal drainage. No seasonal or environmental triggers for symptoms. No significant facial pain/pressure. No associated symptoms of fevers/chills, nausea/vomiting, or nasal obstruction. He is a diabetic with hypertension, ASCVD, and coronary artery disease with stent. Past history of smoking but quit.     No specific sinonasal therapy directed at this currently. The last course of antibiotics was around 11/20/18. Culture directed antibiotics for Pseudomonas sinusitis treated with Cipro based on culture results and with symptomatic improvement.  He does not regularly use nasal decongestant sprays.     He does not recall previously having allergy testing.     He denies a history of asthma.     He denies history of reflux symptoms which is not currently managed with medication.  He has not previously had an EGD.     He denies a diagnosis of obstructive sleep apnea.      He has previously had sinonasal surgery as above.     He does not recall a prior history of nasal trauma other than the sinonasal surgery.     SNOT-22 score = 28, NOSE score = 5%, ETDQ-7 score = 7      Prior sinus surgery with Dr. Cortez 6/26/18:  DATE OF PROCEDURE:  06/26/2018     PROCEDURE:Bilateral maxillary antrostomy with tissue removal, bilateral total ethmoidectomy with tissue removal, nasal septal reconstruction with turbinate cautery  reduction.     PREOPERATIVE DIAGNOSIS:  Chronic bilateral maxillary sinusitis, chronic   bilateral ethmoid sinusitis, severely hypertrophic nasal turbinates, bilateral nasal   obstruction.     POSTOPERATIVE DIAGNOSIS:  Chronic bilateral maxillary sinusitis, chronic   bilateral ethmoid sinusitis, severely hypertrophic nasal turbinates, bilateral nasal   Obstruction.    Past Medical History  He has a past medical history of Cluster headaches, Coronary artery disease, Diabetes mellitus, Diabetes mellitus type II, Headache, High cholesterol, Hyperlipidemia, Hypertension, and Myocardial infarction.    Past Surgical History  He has a past surgical history that includes Coronary angioplasty with stent; Tonsillectomy; Uvulopalatopharyngoplasty; Reconstruction of nose (Bilateral, 6/26/2018); Turbinate resection (Bilateral, 6/26/2018); Functional endoscopic sinus surgery (FESS) using computer-assisted navigation (Bilateral, 6/26/2018); left shoulder; and pr cabg, artery-vein, four (04/12/2019).    Family History  His family history includes Diabetes in his father and mother; Heart disease in his father; Mental illness in his brother.    Social History  He reports that he quit smoking about 10 months ago. His smoking use included cigarettes. He smoked 0.75 packs per day. He has never used smokeless tobacco. He reports that he drank alcohol. He reports that he does not use drugs.    Allergies  He has No Known Allergies.    Medications   He has a current medication list which includes the following prescription(s): aspirin, atorvastatin, blood sugar diagnostic, blood-glucose meter, clotrimazole-betamethasone 1-0.05%, diclofenac sodium, insulin degludec, lancets, lisinopril, metformin, metoprolol tartrate, nateglinide, nicotine polacrilex, nitroglycerin, semaglutide, and sildenafil, and the following Facility-Administered Medications: cefazolin 2 g/50ml dextrose ivpb, dexamethasone, lactated ringers, lidocaine (pf) 10 mg/ml (1%),  "and sodium chloride 0.9%.    Review of Systems  Review of Systems   Constitutional: Negative.    HENT: Positive for sinus pressure. Negative for hoarse voice.    Eyes: Negative.    Respiratory: Negative.    Cardiovascular: Negative.         Heart Problems, Hypertension   Gastrointestinal: Negative.    Endocrine: Negative.         Diabetes   Genitourinary: Negative.    Musculoskeletal: Negative.    Skin: Negative.    Allergic/Immunologic: Negative.    Neurological: Negative.    Hematological: Negative.    Psychiatric/Behavioral: Positive for sleep disturbance.     Objective:     /70 (BP Location: Right arm, Patient Position: Sitting, BP Method: Small (Manual))   Ht 5' 9" (1.753 m)   Wt 89.3 kg (196 lb 12.2 oz)   BMI 29.06 kg/m²        Constitutional:   Vital signs are normal. He appears well-developed and well-nourished. He does not have a sickly appearance. He does not appear ill. No distress. Normal speech.  No hoarse voice, breathy voice and strained voice.      Head:  Normocephalic and atraumatic. Salivary glands normal.  Facial strength is normal.      Ears:    Right Ear: No drainage, swelling or tenderness. No decreased hearing is noted.   Left Ear: No drainage, swelling or tenderness. No decreased hearing is noted.     Nose:  Mucosal edema and rhinorrhea present. No septal deviation (mild) or polyps. No epistaxis. Turbinate hypertrophy.  Right sinus exhibits no maxillary sinus tenderness and no frontal sinus tenderness. Left sinus exhibits no maxillary sinus tenderness and no frontal sinus tenderness.     Mouth/Throat  Oropharynx clear and moist without lesions or asymmetry, normal uvula midline and lips, teeth, and gums normal. No uvula swelling or oral lesions. No posterior oropharyngeal edema or posterior oropharyngeal erythema.     Neck:  Neck normal without thyromegaly masses, asymmetry, normal tracheal structure, crepitus, and tenderness, trachea normal, phonation normal, full range of motion " with neck supple and no adenopathy. No edema, no erythema and no neck rigidity present.     Cardiovascular:   S1 normal, S2 normal and normal heart sounds.      Pulmonary/Chest:   Effort normal and breath sounds normal. No respiratory distress. He has no decreased breath sounds. He has no wheezes.     Psychiatric:   He has a normal mood and affect. His speech is normal and behavior is normal.     Neurological:   He has neurological normal, alert and oriented. No cranial nerve deficit or sensory deficit.     Skin:   No abrasions, lacerations, lesions, or rashes.       Procedure    None    Data Reviewed    WBC (K/uL)   Date Value   02/07/2020 7.39     Eosinophil% (%)   Date Value   02/07/2020 2.8     Eos # (K/uL)   Date Value   02/07/2020 0.2     Platelets (K/uL)   Date Value   02/07/2020 185     Glucose (mg/dL)   Date Value   02/07/2020 169 (H)     No results found for: IGE  I independently reviewed the images of the CT sinuses dated 10/30/19. Pertinent findings include Scattered opacification or mucosal thickening throughout the paranasal sinuses as well as thickening of mucosa within the nasal cavity. There is evidence of pansinusitis sparing only the frontal sinuses which do not demonstrate opacifications.  There is some surgical change from past history of sinus surgery which appears limited to maxillary antrostomies as well as limited ethmoidectomy (anterior).  The left maxillary sinus is nearly completely opacified and there is a rim of mucosal thickening in the right maxillary sinus..        Mucosal thickening throughout the ethmoid sinuses.  Mild septal deviation to the left which is decreased compared to prior preoperative scan with more severe leftward septal deviation.       Near complete opacification of the left maxillary sinus and mucosal thickening throughout the right maxillary sinus.  Appearance of retained uncinate process bilaterally with posteriorly placed maxillary antrostomy on the right and  unclear maxillary antrostomy on the left given complete ostiomeatal complex opacification and appearance of residual uncinate process.  Surgical changes associated with history of ethmoidectomy.       Diffuse mucosal thickening throughout the ethmoid sinuses as well as within the sphenoid sinus.  Frontal sinus is without opacification aside from mucosal thickening within the frontal outflow tract at the superior anterior ethmoid sinuses.     Assessment:     1. Chronic pansinusitis    2. Nasal obstruction    3. Disturbance of smell    4. Status post functional endoscopic sinus surgery (FESS)         Plan:     I had a long discussion with the patient and his wife regarding his condition and the further workup and management options. After discussion of options for management the patient wishes to proceed with sinonasal surgery to address his chronic rhinosinusitis which has been refractory to appropriate medical therapy and recalcitrant to prior surgical intervention.  I have advised revision sinonasal surgery as indicated for his refractory sinusitis that is evident both by diagnostic nasal endoscopy with persistent inflammation present as well as radiographic findings on recent CT scan consistent with chronic sinusitis sparing only the frontal sinuses, with near complete opacification of the left maxillary sinus.  We discussed the risks and benefits of revision endoscopic sinus surgery which would include bilateral total ethmoidectomy, bilateral revision maxillary antrostomy, bilateral sphenoidotomy, with possible additional procedures of revision septoplasty as well as inferior turbinate partial resection and/or outfracture.  All of the patient and his wife's questions were answered today regarding these procedures, anticipated postoperative course, postoperative follow-up, and recommendations for activity restrictions and anticipated need for time off of work following these procedures.  They understand the need  for ongoing medical management and the intent of surgery not only to promote drainage of the sinuses but to allow greater access for topical therapies and irrigations postoperatively. Surgery has been scheduled for 2/11/2020 He will continue with current medical management as previously prescribed and contact us for additional refills as needed.  I have encouraged him to call for any questions or concerns in the meantime.

## 2020-01-30 ENCOUNTER — TELEPHONE (OUTPATIENT)
Dept: OTOLARYNGOLOGY | Facility: CLINIC | Age: 60
End: 2020-01-30

## 2020-01-30 NOTE — TELEPHONE ENCOUNTER
Spoke with Patient's wife and informed her that teeth cleaning should be fine next wk.  He is not having Surgery until mid February.

## 2020-01-30 NOTE — TELEPHONE ENCOUNTER
----- Message from Marisela Mckeon sent at 1/30/2020 10:37 AM CST -----  Contact: Self/  [phone]  Type: Patient Call Back    Who called:  Patient    What is the request in detail:  Patient is having surgery next week.  He's wanting to know if he's cleared to go have his teeth clean next Wednesday or Thursday.  Thank you    Would the patient rather a call back or a response via [facility]?   Call back    Best call back number:  [phone]

## 2020-02-03 ENCOUNTER — TELEPHONE (OUTPATIENT)
Dept: OTOLARYNGOLOGY | Facility: CLINIC | Age: 60
End: 2020-02-03

## 2020-02-03 NOTE — TELEPHONE ENCOUNTER
Explained to [employer], patient short term disability company that I can not provide them with information due to the patient not coming to sign a release of information form yet.

## 2020-02-03 NOTE — TELEPHONE ENCOUNTER
----- Message from Marisela Mckeon sent at 2/3/2020  3:14 PM CST -----  Contact: Una/ Khushbu/  [phone]  ref # [id number]  Type: Patient Call Back    Who called:  Una    What is the request in detail:  Una would like to verify surgery information for patients short term disability.  Thank you    Would the patient rather a call back or a response via [facility]?   Call back    Best call back number:  [phone]  ref # [id number]

## 2020-02-05 ENCOUNTER — TELEPHONE (OUTPATIENT)
Dept: OTOLARYNGOLOGY | Facility: CLINIC | Age: 60
End: 2020-02-05

## 2020-02-05 NOTE — TELEPHONE ENCOUNTER
Mr. Faustin came into office to fill out a release of authorization for his FMLA.  Scanned in Media

## 2020-02-07 ENCOUNTER — HOSPITAL ENCOUNTER (OUTPATIENT)
Dept: PREADMISSION TESTING | Facility: HOSPITAL | Age: 60
Discharge: HOME | End: 2020-02-07
Attending: OTOLARYNGOLOGY
Payer: COMMERCIAL

## 2020-02-07 VITALS
TEMPERATURE: 99 F | RESPIRATION RATE: 16 BRPM | HEART RATE: 92 BPM | SYSTOLIC BLOOD PRESSURE: 109 MMHG | OXYGEN SATURATION: 98 % | WEIGHT: 197.56 LBS | BODY MASS INDEX: 28.28 KG/M2 | DIASTOLIC BLOOD PRESSURE: 78 MMHG | HEIGHT: 70 IN

## 2020-02-07 DIAGNOSIS — J32.4 CHRONIC PANSINUSITIS: ICD-10-CM

## 2020-02-07 DIAGNOSIS — Z01.818 PRE-OP TESTING: Primary | ICD-10-CM

## 2020-02-07 LAB
ANION GAP SERPL CALC-SCNC: 7 MMOL/L (ref 8–16)
BASOPHILS # BLD AUTO: 0.06 K/UL (ref 0–0.2)
BASOPHILS NFR BLD: 0.8 % (ref 0–1.9)
BUN SERPL-MCNC: 16 MG/DL (ref 6–20)
CALCIUM SERPL-MCNC: 10.1 MG/DL (ref 8.7–10.5)
CHLORIDE SERPL-SCNC: 105 MMOL/L (ref 95–110)
CO2 SERPL-SCNC: 27 MMOL/L (ref 23–29)
CREAT SERPL-MCNC: 1 MG/DL (ref 0.5–1.4)
DIFFERENTIAL METHOD: NORMAL
EOSINOPHIL # BLD AUTO: 0.2 K/UL (ref 0–0.5)
EOSINOPHIL NFR BLD: 2.8 % (ref 0–8)
ERYTHROCYTE [DISTWIDTH] IN BLOOD BY AUTOMATED COUNT: 13 % (ref 11.5–14.5)
EST. GFR  (AFRICAN AMERICAN): >60 ML/MIN/1.73 M^2
EST. GFR  (NON AFRICAN AMERICAN): >60 ML/MIN/1.73 M^2
GLUCOSE SERPL-MCNC: 169 MG/DL (ref 70–110)
HCT VFR BLD AUTO: 47.3 % (ref 40–54)
HGB BLD-MCNC: 16.2 G/DL (ref 14–18)
IMM GRANULOCYTES # BLD AUTO: 0.02 K/UL (ref 0–0.04)
IMM GRANULOCYTES NFR BLD AUTO: 0.3 % (ref 0–0.5)
LYMPHOCYTES # BLD AUTO: 2.2 K/UL (ref 1–4.8)
LYMPHOCYTES NFR BLD: 30.3 % (ref 18–48)
MCH RBC QN AUTO: 29.8 PG (ref 27–31)
MCHC RBC AUTO-ENTMCNC: 34.2 G/DL (ref 32–36)
MCV RBC AUTO: 87 FL (ref 82–98)
MONOCYTES # BLD AUTO: 0.5 K/UL (ref 0.3–1)
MONOCYTES NFR BLD: 7 % (ref 4–15)
NEUTROPHILS # BLD AUTO: 4.3 K/UL (ref 1.8–7.7)
NEUTROPHILS NFR BLD: 58.8 % (ref 38–73)
NRBC BLD-RTO: 0 /100 WBC
PLATELET # BLD AUTO: 185 K/UL (ref 150–350)
PMV BLD AUTO: 9.5 FL (ref 9.2–12.9)
POTASSIUM SERPL-SCNC: 4.7 MMOL/L (ref 3.5–5.1)
RBC # BLD AUTO: 5.43 M/UL (ref 4.6–6.2)
SODIUM SERPL-SCNC: 139 MMOL/L (ref 136–145)
WBC # BLD AUTO: 7.39 K/UL (ref 3.9–12.7)

## 2020-02-07 PROCEDURE — 93010 EKG 12-LEAD: ICD-10-PCS | Mod: ,,, | Performed by: INTERNAL MEDICINE

## 2020-02-07 PROCEDURE — 85025 COMPLETE CBC W/AUTO DIFF WBC: CPT

## 2020-02-07 PROCEDURE — 93005 ELECTROCARDIOGRAM TRACING: CPT

## 2020-02-07 PROCEDURE — 36415 COLL VENOUS BLD VENIPUNCTURE: CPT

## 2020-02-07 PROCEDURE — 80048 BASIC METABOLIC PNL TOTAL CA: CPT

## 2020-02-07 PROCEDURE — 93010 ELECTROCARDIOGRAM REPORT: CPT | Mod: ,,, | Performed by: INTERNAL MEDICINE

## 2020-02-07 NOTE — DISCHARGE INSTRUCTIONS
Your surgery is scheduled for __Tuesday Feb. 11, 2020___.    Call [phone] between 2 p.m. and 5 p.m. on   Monday __ to find out your arrival time for the day of your surgery.      Please report to SAME DAY SURGERY UNIT on the 2nd FLOOR at _______ a.m.  Use front door entrance. The doors open at 0530 am.          INSTRUCTIONS IMPORTANT!!!  ¨ Do not eat or drink after 12 midnight-including water. OK to brush teeth, no   gum, candy or mints!    ¨ Take only these medicines with a small swallow of water-morning of surgery.  Take Metoprolol with water morning of surgery.      _x___  Prep instructions   SHOWER     _x___  No powder, lotions or creams to your body.  _x___  You may wear only deodorant on the day of surgery.  _x___  Please remove all jewelry, including piercings and leave at home.  _x___  No money or valuables needed. Please leave at home.  You may bring your  cell phone.  ____  Please bring any documents given by your doctor.  _x___  If going home the same day, arrange for a ride home. You will not be able to   drive if Anesthesia was used.    _x___  Wear loose fitting clothing. Allow for dressings, bandages.  _x___  Stop Aspirin, Ibuprofen, Motrin and Aleve at least 3-5 days before  surgery, unless otherwise instructed by your doctor, or the nurse.              You MAY use Tylenol/acetaminophen until day of surgery.  _x___  If you take diabetic medication, do not take am of surgery   .  _x___  Call MD for temperature above 101 degrees.        _x___ Stop taking any Fish Oil supplement or any Vitamins that contain Vitamin  E at least 5 days prior to surgery.            I have read or had read and explained to me, and understand the above information.  Additional comments or instructions:Please call   [phone] if you have any questions regarding the instructions above.

## 2020-02-10 ENCOUNTER — ANESTHESIA EVENT (OUTPATIENT)
Dept: SURGERY | Facility: HOSPITAL | Age: 60
End: 2020-02-10
Payer: COMMERCIAL

## 2020-02-11 ENCOUNTER — HOSPITAL ENCOUNTER (OUTPATIENT)
Facility: HOSPITAL | Age: 60
Discharge: HOME | End: 2020-02-11
Attending: OTOLARYNGOLOGY | Admitting: OTOLARYNGOLOGY
Payer: COMMERCIAL

## 2020-02-11 ENCOUNTER — ANESTHESIA (OUTPATIENT)
Dept: SURGERY | Facility: HOSPITAL | Age: 60
End: 2020-02-11
Payer: COMMERCIAL

## 2020-02-11 ENCOUNTER — TELEPHONE (OUTPATIENT)
Dept: OTOLARYNGOLOGY | Facility: CLINIC | Age: 60
End: 2020-02-11

## 2020-02-11 VITALS
SYSTOLIC BLOOD PRESSURE: 112 MMHG | BODY MASS INDEX: 28.28 KG/M2 | RESPIRATION RATE: 18 BRPM | HEART RATE: 92 BPM | WEIGHT: 197.56 LBS | HEIGHT: 70 IN | TEMPERATURE: 98 F | DIASTOLIC BLOOD PRESSURE: 61 MMHG | OXYGEN SATURATION: 98 %

## 2020-02-11 DIAGNOSIS — J32.9 SUPPURATIVE SINUSITIS: ICD-10-CM

## 2020-02-11 DIAGNOSIS — J32.9 SINUSITIS, UNSPECIFIED CHRONICITY, UNSPECIFIED LOCATION: ICD-10-CM

## 2020-02-11 DIAGNOSIS — J34.89 NASAL OBSTRUCTION: ICD-10-CM

## 2020-02-11 DIAGNOSIS — J32.4 CHRONIC PANSINUSITIS: Primary | ICD-10-CM

## 2020-02-11 DIAGNOSIS — Z98.890 HX OF MICRODISCECTOMY: ICD-10-CM

## 2020-02-11 DIAGNOSIS — J34.2 NASAL SEPTAL DEVIATION: ICD-10-CM

## 2020-02-11 LAB
ANION GAP SERPL CALC-SCNC: 10 MMOL/L (ref 8–16)
BASOPHILS # BLD AUTO: 0.02 K/UL (ref 0–0.2)
BASOPHILS NFR BLD: 0.2 % (ref 0–1.9)
BUN SERPL-MCNC: 20 MG/DL (ref 6–20)
CALCIUM SERPL-MCNC: 8.5 MG/DL (ref 8.7–10.5)
CHLORIDE SERPL-SCNC: 107 MMOL/L (ref 95–110)
CK MB SERPL-MCNC: 0.7 NG/ML (ref 0.1–6.5)
CK MB SERPL-RTO: 2.3 % (ref 0–5)
CK SERPL-CCNC: 30 U/L (ref 20–200)
CK SERPL-CCNC: 30 U/L (ref 20–200)
CO2 SERPL-SCNC: 20 MMOL/L (ref 23–29)
CREAT SERPL-MCNC: 1.1 MG/DL (ref 0.5–1.4)
DIFFERENTIAL METHOD: ABNORMAL
EOSINOPHIL # BLD AUTO: 0 K/UL (ref 0–0.5)
EOSINOPHIL NFR BLD: 0 % (ref 0–8)
ERYTHROCYTE [DISTWIDTH] IN BLOOD BY AUTOMATED COUNT: 13.1 % (ref 11.5–14.5)
EST. GFR  (AFRICAN AMERICAN): >60 ML/MIN/1.73 M^2
EST. GFR  (NON AFRICAN AMERICAN): >60 ML/MIN/1.73 M^2
GLUCOSE SERPL-MCNC: 264 MG/DL (ref 70–110)
GRAM STN SPEC: NORMAL
GRAM STN SPEC: NORMAL
HCT VFR BLD AUTO: 39.6 % (ref 40–54)
HGB BLD-MCNC: 13.1 G/DL (ref 14–18)
IMM GRANULOCYTES # BLD AUTO: 0.03 K/UL (ref 0–0.04)
IMM GRANULOCYTES NFR BLD AUTO: 0.4 % (ref 0–0.5)
LYMPHOCYTES # BLD AUTO: 0.7 K/UL (ref 1–4.8)
LYMPHOCYTES NFR BLD: 8.4 % (ref 18–48)
MCH RBC QN AUTO: 29.1 PG (ref 27–31)
MCHC RBC AUTO-ENTMCNC: 33.1 G/DL (ref 32–36)
MCV RBC AUTO: 88 FL (ref 82–98)
MONOCYTES # BLD AUTO: 0.2 K/UL (ref 0.3–1)
MONOCYTES NFR BLD: 1.8 % (ref 4–15)
NEUTROPHILS # BLD AUTO: 7.3 K/UL (ref 1.8–7.7)
NEUTROPHILS NFR BLD: 89.2 % (ref 38–73)
NRBC BLD-RTO: 0 /100 WBC
PLATELET # BLD AUTO: 170 K/UL (ref 150–350)
PMV BLD AUTO: 9.5 FL (ref 9.2–12.9)
POCT GLUCOSE: 157 MG/DL (ref 70–110)
POCT GLUCOSE: 282 MG/DL (ref 70–110)
POTASSIUM SERPL-SCNC: 4.6 MMOL/L (ref 3.5–5.1)
RBC # BLD AUTO: 4.5 M/UL (ref 4.6–6.2)
SODIUM SERPL-SCNC: 137 MMOL/L (ref 136–145)
TROPONIN I SERPL DL<=0.01 NG/ML-MCNC: <0.006 NG/ML (ref 0–0.03)
WBC # BLD AUTO: 8.17 K/UL (ref 3.9–12.7)

## 2020-02-11 PROCEDURE — 36415 COLL VENOUS BLD VENIPUNCTURE: CPT

## 2020-02-11 PROCEDURE — 31276 PR NASAL/SINUS ENDOSCOPY,EXPLOR FRONTAL SINUS: ICD-10-PCS | Mod: 50,51,, | Performed by: OTOLARYNGOLOGY

## 2020-02-11 PROCEDURE — 25000003 PHARM REV CODE 250: Performed by: NURSE ANESTHETIST, CERTIFIED REGISTERED

## 2020-02-11 PROCEDURE — 31276 NSL/SINS NDSC FRNT TISS RMVL: CPT | Mod: 50,51,, | Performed by: OTOLARYNGOLOGY

## 2020-02-11 PROCEDURE — 88305 TISSUE EXAM BY PATHOLOGIST: CPT | Mod: 59 | Performed by: PATHOLOGY

## 2020-02-11 PROCEDURE — 27201423 OPTIME MED/SURG SUP & DEVICES STERILE SUPPLY: Performed by: OTOLARYNGOLOGY

## 2020-02-11 PROCEDURE — 93010 EKG 12-LEAD: ICD-10-PCS | Mod: ,,, | Performed by: INTERNAL MEDICINE

## 2020-02-11 PROCEDURE — D9220A PRA ANESTHESIA: ICD-10-PCS | Mod: CRNA,,, | Performed by: NURSE ANESTHETIST, CERTIFIED REGISTERED

## 2020-02-11 PROCEDURE — D9220A PRA ANESTHESIA: Mod: CRNA,,, | Performed by: NURSE ANESTHETIST, CERTIFIED REGISTERED

## 2020-02-11 PROCEDURE — 63600175 PHARM REV CODE 636 W HCPCS: Performed by: NURSE ANESTHETIST, CERTIFIED REGISTERED

## 2020-02-11 PROCEDURE — 30520 REPAIR OF NASAL SEPTUM: CPT | Mod: 51,,, | Performed by: OTOLARYNGOLOGY

## 2020-02-11 PROCEDURE — 36000710: Performed by: OTOLARYNGOLOGY

## 2020-02-11 PROCEDURE — 37000008 HC ANESTHESIA 1ST 15 MINUTES: Performed by: OTOLARYNGOLOGY

## 2020-02-11 PROCEDURE — 80048 BASIC METABOLIC PNL TOTAL CA: CPT

## 2020-02-11 PROCEDURE — 93010 ELECTROCARDIOGRAM REPORT: CPT | Mod: ,,, | Performed by: INTERNAL MEDICINE

## 2020-02-11 PROCEDURE — 63600175 PHARM REV CODE 636 W HCPCS: Performed by: ANESTHESIOLOGY

## 2020-02-11 PROCEDURE — 82553 CREATINE MB FRACTION: CPT

## 2020-02-11 PROCEDURE — D9220A PRA ANESTHESIA: ICD-10-PCS | Mod: ANES,,, | Performed by: ANESTHESIOLOGY

## 2020-02-11 PROCEDURE — 25000003 PHARM REV CODE 250: Performed by: OTOLARYNGOLOGY

## 2020-02-11 PROCEDURE — 87206 SMEAR FLUORESCENT/ACID STAI: CPT

## 2020-02-11 PROCEDURE — 82962 GLUCOSE BLOOD TEST: CPT | Performed by: OTOLARYNGOLOGY

## 2020-02-11 PROCEDURE — 71000033 HC RECOVERY, INTIAL HOUR: Performed by: OTOLARYNGOLOGY

## 2020-02-11 PROCEDURE — 87075 CULTR BACTERIA EXCEPT BLOOD: CPT

## 2020-02-11 PROCEDURE — 71000015 HC POSTOP RECOV 1ST HR: Performed by: OTOLARYNGOLOGY

## 2020-02-11 PROCEDURE — 36000711: Performed by: OTOLARYNGOLOGY

## 2020-02-11 PROCEDURE — 88305 TISSUE EXAM BY PATHOLOGIST: CPT | Mod: 26,,, | Performed by: PATHOLOGY

## 2020-02-11 PROCEDURE — 30520 PR REPAIR, NASAL SEPTUM: ICD-10-PCS | Mod: 51,,, | Performed by: OTOLARYNGOLOGY

## 2020-02-11 PROCEDURE — 87102 FUNGUS ISOLATION CULTURE: CPT

## 2020-02-11 PROCEDURE — C1894 INTRO/SHEATH, NON-LASER: HCPCS | Performed by: OTOLARYNGOLOGY

## 2020-02-11 PROCEDURE — 87205 SMEAR GRAM STAIN: CPT

## 2020-02-11 PROCEDURE — 31257 NSL/SINS NDSC TOT W/SPHENDT: CPT | Mod: 50,22,, | Performed by: OTOLARYNGOLOGY

## 2020-02-11 PROCEDURE — 88305 TISSUE EXAM BY PATHOLOGIST: ICD-10-PCS | Mod: 26,,, | Performed by: PATHOLOGY

## 2020-02-11 PROCEDURE — 93005 ELECTROCARDIOGRAM TRACING: CPT | Mod: 59

## 2020-02-11 PROCEDURE — 82550 ASSAY OF CK (CPK): CPT

## 2020-02-11 PROCEDURE — 87116 MYCOBACTERIA CULTURE: CPT

## 2020-02-11 PROCEDURE — 31267 ENDOSCOPY MAXILLARY SINUS: CPT | Mod: 50,51,, | Performed by: OTOLARYNGOLOGY

## 2020-02-11 PROCEDURE — 31257 PR ENDOSCOPY, NASAL/SINUS, W/ETHMOIDECTOMY/SPHENOIDOTOMY: ICD-10-PCS | Mod: 50,22,, | Performed by: OTOLARYNGOLOGY

## 2020-02-11 PROCEDURE — 61782 PR STEREOTACTIC COMP ASSIST PROC,CRANIAL,EXTRADURAL: ICD-10-PCS | Mod: ,,, | Performed by: OTOLARYNGOLOGY

## 2020-02-11 PROCEDURE — 87070 CULTURE OTHR SPECIMN AEROBIC: CPT

## 2020-02-11 PROCEDURE — C2625 STENT, NON-COR, TEM W/DEL SY: HCPCS | Performed by: OTOLARYNGOLOGY

## 2020-02-11 PROCEDURE — 63600175 PHARM REV CODE 636 W HCPCS: Performed by: OTOLARYNGOLOGY

## 2020-02-11 PROCEDURE — 61782 SCAN PROC CRANIAL EXTRA: CPT | Mod: ,,, | Performed by: OTOLARYNGOLOGY

## 2020-02-11 PROCEDURE — 71000016 HC POSTOP RECOV ADDL HR: Performed by: OTOLARYNGOLOGY

## 2020-02-11 PROCEDURE — 71000039 HC RECOVERY, EACH ADD'L HOUR: Performed by: OTOLARYNGOLOGY

## 2020-02-11 PROCEDURE — 31267 PR NASAL/SINUS ENDOSCOPY,RMV TISS MAXILL SINUS: ICD-10-PCS | Mod: 50,51,, | Performed by: OTOLARYNGOLOGY

## 2020-02-11 PROCEDURE — 85025 COMPLETE CBC W/AUTO DIFF WBC: CPT

## 2020-02-11 PROCEDURE — 84484 ASSAY OF TROPONIN QUANT: CPT

## 2020-02-11 PROCEDURE — 37000009 HC ANESTHESIA EA ADD 15 MINS: Performed by: OTOLARYNGOLOGY

## 2020-02-11 PROCEDURE — D9220A PRA ANESTHESIA: Mod: ANES,,, | Performed by: ANESTHESIOLOGY

## 2020-02-11 DEVICE — IMPLANT PROPEL MOMETASONE: Type: IMPLANTABLE DEVICE | Site: NOSE | Status: FUNCTIONAL

## 2020-02-11 RX ORDER — EPINEPHRINE NASAL SOLUTION 1 MG/ML
SOLUTION NASAL
Status: DISCONTINUED | OUTPATIENT
Start: 2020-02-11 | End: 2020-02-11 | Stop reason: HOSPADM

## 2020-02-11 RX ORDER — CEFAZOLIN SODIUM 2 G/50ML
2 SOLUTION INTRAVENOUS
Status: COMPLETED | OUTPATIENT
Start: 2020-02-11 | End: 2020-02-11

## 2020-02-11 RX ORDER — PHENYLEPHRINE HYDROCHLORIDE 10 MG/ML
INJECTION INTRAVENOUS
Status: DISCONTINUED | OUTPATIENT
Start: 2020-02-11 | End: 2020-02-11

## 2020-02-11 RX ORDER — SODIUM CHLORIDE 0.9 % (FLUSH) 0.9 %
10 SYRINGE (ML) INJECTION
Status: DISCONTINUED | OUTPATIENT
Start: 2020-02-11 | End: 2020-02-11 | Stop reason: HOSPADM

## 2020-02-11 RX ORDER — DEXAMETHASONE SODIUM PHOSPHATE 4 MG/ML
8 INJECTION, SOLUTION INTRA-ARTICULAR; INTRALESIONAL; INTRAMUSCULAR; INTRAVENOUS; SOFT TISSUE
Status: DISCONTINUED | OUTPATIENT
Start: 2020-02-11 | End: 2020-02-11 | Stop reason: HOSPADM

## 2020-02-11 RX ORDER — SODIUM CHLORIDE, SODIUM LACTATE, POTASSIUM CHLORIDE, CALCIUM CHLORIDE 600; 310; 30; 20 MG/100ML; MG/100ML; MG/100ML; MG/100ML
INJECTION, SOLUTION INTRAVENOUS CONTINUOUS
Status: DISCONTINUED | OUTPATIENT
Start: 2020-02-11 | End: 2020-02-11 | Stop reason: HOSPADM

## 2020-02-11 RX ORDER — ACETAMINOPHEN 325 MG/1
650 TABLET ORAL EVERY 4 HOURS PRN
Status: DISCONTINUED | OUTPATIENT
Start: 2020-02-11 | End: 2020-02-11 | Stop reason: HOSPADM

## 2020-02-11 RX ORDER — IBUPROFEN 600 MG/1
600 TABLET ORAL EVERY 6 HOURS PRN
Qty: 30 TABLET | Refills: 1 | Status: SHIPPED | OUTPATIENT
Start: 2020-02-11 | End: 2020-09-16

## 2020-02-11 RX ORDER — OXYCODONE HYDROCHLORIDE 5 MG/1
5 TABLET ORAL EVERY 4 HOURS PRN
Status: DISCONTINUED | OUTPATIENT
Start: 2020-02-11 | End: 2020-02-11 | Stop reason: HOSPADM

## 2020-02-11 RX ORDER — LIDOCAINE HCL/PF 100 MG/5ML
SYRINGE (ML) INTRAVENOUS
Status: DISCONTINUED | OUTPATIENT
Start: 2020-02-11 | End: 2020-02-11

## 2020-02-11 RX ORDER — ROCURONIUM BROMIDE 10 MG/ML
INJECTION, SOLUTION INTRAVENOUS
Status: DISCONTINUED | OUTPATIENT
Start: 2020-02-11 | End: 2020-02-11

## 2020-02-11 RX ORDER — MIDAZOLAM HYDROCHLORIDE 1 MG/ML
INJECTION, SOLUTION INTRAMUSCULAR; INTRAVENOUS
Status: DISCONTINUED | OUTPATIENT
Start: 2020-02-11 | End: 2020-02-11

## 2020-02-11 RX ORDER — DEXAMETHASONE SODIUM PHOSPHATE 4 MG/ML
INJECTION, SOLUTION INTRA-ARTICULAR; INTRALESIONAL; INTRAMUSCULAR; INTRAVENOUS; SOFT TISSUE
Status: DISCONTINUED | OUTPATIENT
Start: 2020-02-11 | End: 2020-02-11

## 2020-02-11 RX ORDER — PROPOFOL 10 MG/ML
VIAL (ML) INTRAVENOUS
Status: DISCONTINUED | OUTPATIENT
Start: 2020-02-11 | End: 2020-02-11

## 2020-02-11 RX ORDER — SUCCINYLCHOLINE CHLORIDE 20 MG/ML
INJECTION INTRAMUSCULAR; INTRAVENOUS
Status: DISCONTINUED | OUTPATIENT
Start: 2020-02-11 | End: 2020-02-11

## 2020-02-11 RX ORDER — OXYMETAZOLINE HCL 0.05 %
2 SPRAY, NON-AEROSOL (ML) NASAL
Status: COMPLETED | OUTPATIENT
Start: 2020-02-11 | End: 2020-02-11

## 2020-02-11 RX ORDER — HYDROMORPHONE HYDROCHLORIDE 2 MG/ML
0.2 INJECTION, SOLUTION INTRAMUSCULAR; INTRAVENOUS; SUBCUTANEOUS EVERY 5 MIN PRN
Status: DISCONTINUED | OUTPATIENT
Start: 2020-02-11 | End: 2020-02-11 | Stop reason: HOSPADM

## 2020-02-11 RX ORDER — ONDANSETRON 8 MG/1
8 TABLET, ORALLY DISINTEGRATING ORAL ONCE
Qty: 15 TABLET | Refills: 0 | Status: SHIPPED | OUTPATIENT
Start: 2020-02-11 | End: 2020-02-11

## 2020-02-11 RX ORDER — LIDOCAINE HYDROCHLORIDE 10 MG/ML
1 INJECTION, SOLUTION EPIDURAL; INFILTRATION; INTRACAUDAL; PERINEURAL ONCE
Status: DISCONTINUED | OUTPATIENT
Start: 2020-02-11 | End: 2020-02-11 | Stop reason: HOSPADM

## 2020-02-11 RX ORDER — SODIUM CHLORIDE, SODIUM LACTATE, POTASSIUM CHLORIDE, CALCIUM CHLORIDE 600; 310; 30; 20 MG/100ML; MG/100ML; MG/100ML; MG/100ML
INJECTION, SOLUTION INTRAVENOUS CONTINUOUS PRN
Status: DISCONTINUED | OUTPATIENT
Start: 2020-02-11 | End: 2020-02-11

## 2020-02-11 RX ORDER — ESMOLOL HYDROCHLORIDE 10 MG/ML
INJECTION INTRAVENOUS
Status: DISCONTINUED | OUTPATIENT
Start: 2020-02-11 | End: 2020-02-11

## 2020-02-11 RX ORDER — FENTANYL CITRATE 50 UG/ML
INJECTION, SOLUTION INTRAMUSCULAR; INTRAVENOUS
Status: DISCONTINUED | OUTPATIENT
Start: 2020-02-11 | End: 2020-02-11

## 2020-02-11 RX ORDER — LIDOCAINE HYDROCHLORIDE AND EPINEPHRINE 10; 10 MG/ML; UG/ML
INJECTION, SOLUTION INFILTRATION; PERINEURAL
Status: DISCONTINUED | OUTPATIENT
Start: 2020-02-11 | End: 2020-02-11 | Stop reason: HOSPADM

## 2020-02-11 RX ORDER — AMOXICILLIN AND CLAVULANATE POTASSIUM 875; 125 MG/1; MG/1
1 TABLET, FILM COATED ORAL 2 TIMES DAILY
Qty: 20 TABLET | Refills: 0 | Status: SHIPPED | OUTPATIENT
Start: 2020-02-11 | End: 2020-02-21

## 2020-02-11 RX ORDER — PREDNISONE 10 MG/1
TABLET ORAL
Qty: 24 TABLET | Refills: 0 | Status: SHIPPED | OUTPATIENT
Start: 2020-02-11 | End: 2020-06-12

## 2020-02-11 RX ORDER — OXYCODONE HYDROCHLORIDE 5 MG/1
5 TABLET ORAL EVERY 4 HOURS PRN
Qty: 10 TABLET | Refills: 0 | Status: SHIPPED | OUTPATIENT
Start: 2020-02-11 | End: 2020-06-12

## 2020-02-11 RX ORDER — ONDANSETRON 4 MG/1
8 TABLET, ORALLY DISINTEGRATING ORAL ONCE
Status: DISCONTINUED | OUTPATIENT
Start: 2020-02-11 | End: 2020-02-11 | Stop reason: HOSPADM

## 2020-02-11 RX ORDER — ONDANSETRON 2 MG/ML
INJECTION INTRAMUSCULAR; INTRAVENOUS
Status: DISCONTINUED | OUTPATIENT
Start: 2020-02-11 | End: 2020-02-11

## 2020-02-11 RX ADMIN — SUCCINYLCHOLINE CHLORIDE 160 MG: 20 INJECTION, SOLUTION INTRAMUSCULAR; INTRAVENOUS at 07:02

## 2020-02-11 RX ADMIN — PHENYLEPHRINE HYDROCHLORIDE 100 MCG: 10 INJECTION INTRAVENOUS at 10:02

## 2020-02-11 RX ADMIN — INSULIN HUMAN 5 UNITS: 100 INJECTION, SOLUTION PARENTERAL at 12:02

## 2020-02-11 RX ADMIN — PHENYLEPHRINE HYDROCHLORIDE 100 MCG: 10 INJECTION INTRAVENOUS at 08:02

## 2020-02-11 RX ADMIN — DEXAMETHASONE SODIUM PHOSPHATE 8 MG: 4 INJECTION, SOLUTION INTRAMUSCULAR; INTRAVENOUS at 07:02

## 2020-02-11 RX ADMIN — ROCURONIUM BROMIDE 10 MG: 10 INJECTION, SOLUTION INTRAVENOUS at 08:02

## 2020-02-11 RX ADMIN — Medication 2 SPRAY: at 06:02

## 2020-02-11 RX ADMIN — PHENYLEPHRINE HYDROCHLORIDE 100 MCG: 10 INJECTION INTRAVENOUS at 07:02

## 2020-02-11 RX ADMIN — PROPOFOL 50 MG: 10 INJECTION, EMULSION INTRAVENOUS at 09:02

## 2020-02-11 RX ADMIN — PHENYLEPHRINE HYDROCHLORIDE 100 MCG: 10 INJECTION INTRAVENOUS at 09:02

## 2020-02-11 RX ADMIN — ESMOLOL HYDROCHLORIDE 30 MG: 10 INJECTION, SOLUTION INTRAVENOUS at 10:02

## 2020-02-11 RX ADMIN — SUGAMMADEX 179 MG: 100 INJECTION, SOLUTION INTRAVENOUS at 10:02

## 2020-02-11 RX ADMIN — ROCURONIUM BROMIDE 5 MG: 10 INJECTION, SOLUTION INTRAVENOUS at 07:02

## 2020-02-11 RX ADMIN — FENTANYL CITRATE 50 MCG: 50 INJECTION INTRAMUSCULAR; INTRAVENOUS at 10:02

## 2020-02-11 RX ADMIN — FENTANYL CITRATE 100 MCG: 50 INJECTION INTRAMUSCULAR; INTRAVENOUS at 07:02

## 2020-02-11 RX ADMIN — PHENYLEPHRINE HYDROCHLORIDE 200 MCG: 10 INJECTION INTRAVENOUS at 08:02

## 2020-02-11 RX ADMIN — SODIUM CHLORIDE, SODIUM LACTATE, POTASSIUM CHLORIDE, AND CALCIUM CHLORIDE: .6; .31; .03; .02 INJECTION, SOLUTION INTRAVENOUS at 07:02

## 2020-02-11 RX ADMIN — MIDAZOLAM HYDROCHLORIDE 2 MG: 1 INJECTION, SOLUTION INTRAMUSCULAR; INTRAVENOUS at 07:02

## 2020-02-11 RX ADMIN — LIDOCAINE HYDROCHLORIDE 80 MG: 20 INJECTION, SOLUTION INTRAVENOUS at 07:02

## 2020-02-11 RX ADMIN — SODIUM CHLORIDE, SODIUM LACTATE, POTASSIUM CHLORIDE, AND CALCIUM CHLORIDE: .6; .31; .03; .02 INJECTION, SOLUTION INTRAVENOUS at 09:02

## 2020-02-11 RX ADMIN — ROCURONIUM BROMIDE 45 MG: 10 INJECTION, SOLUTION INTRAVENOUS at 07:02

## 2020-02-11 RX ADMIN — PROPOFOL 150 MG: 10 INJECTION, EMULSION INTRAVENOUS at 07:02

## 2020-02-11 RX ADMIN — CEFAZOLIN SODIUM 2 G: 2 SOLUTION INTRAVENOUS at 07:02

## 2020-02-11 RX ADMIN — ACETAMINOPHEN 650 MG: 325 TABLET ORAL at 12:02

## 2020-02-11 RX ADMIN — ESMOLOL HYDROCHLORIDE 40 MG: 10 INJECTION, SOLUTION INTRAVENOUS at 10:02

## 2020-02-11 RX ADMIN — PHENYLEPHRINE HYDROCHLORIDE 200 MCG: 10 INJECTION INTRAVENOUS at 07:02

## 2020-02-11 RX ADMIN — ONDANSETRON 4 MG: 2 INJECTION, SOLUTION INTRAMUSCULAR; INTRAVENOUS at 07:02

## 2020-02-11 RX ADMIN — SODIUM CHLORIDE, SODIUM LACTATE, POTASSIUM CHLORIDE, AND CALCIUM CHLORIDE 500 ML: .6; .31; .03; .02 INJECTION, SOLUTION INTRAVENOUS at 01:02

## 2020-02-11 NOTE — ANESTHESIA POSTPROCEDURE EVALUATION
Anesthesia Post Evaluation    Patient: Stanley Faustin    Procedure(s) Performed: Procedure(s) (LRB):  SEPTOPLASTY, NOSE, ENDOSCOPIC (N/A)  EXCISION, NASAL TURBINATE, SUBMUCOSAL (Bilateral)  FESS, USING COMPUTER-ASSISTED NAVIGATION (Bilateral)    Final Anesthesia Type: general    Patient location during evaluation: PACU  Patient participation: Yes- Able to Participate  Level of consciousness: awake and alert, oriented and awake  Post-procedure vital signs: reviewed and stable  Pain management: adequate  Airway patency: patent    PONV status at discharge: No PONV  Anesthetic complications: no      Cardiovascular status: blood pressure returned to baseline, hemodynamically stable and stable  Respiratory status: unassisted and spontaneous ventilation  Hydration status: euvolemic  Follow-up not needed.          Vitals Value Taken Time   BP 81/52 2/11/2020 11:56 AM   Temp 36.7 °C (98 °F) 2/11/2020 11:04 AM   Pulse 110 2/11/2020 11:56 AM   Resp 24 2/11/2020 11:56 AM   SpO2 92 % 2/11/2020 11:56 AM   Vitals shown include unvalidated device data.      No case tracking events are documented in the log.      Pain/Reginald Score: Pain Rating Prior to Med Admin: 0 (2/11/2020 11:01 AM)  Pain Rating Post Med Admin: 0 (2/11/2020 11:01 AM)  Reginald Score: 8 (2/11/2020 11:01 AM)

## 2020-02-11 NOTE — BRIEF OP NOTE
Brief Operative Note     SUMMARY     Surgery Date: 2/11/2020     Surgeon: Clifton Angel MD    Surgeon(s) and Role:     * Clifton Angel MD - Primary    Pre-op Diagnosis:    Chronic pansinusitis [J32.4]  Suppurative maxillary sinusitis  Nasal septal deviation     Post-op Diagnosis:    Chronic pansinusitis [J32.4]  Suppurative maxillary sinusitis  Nasal septal deviation     Procedures Performed:   Procedure(s) (LRB):  SEPTOPLASTY, NOSE, ENDOSCOPIC (N/A)  FESS, USING COMPUTER-ASSISTED NAVIGATION (Bilateral)    Anesthesia: General    Findings/Key Components:  Purulent material and polyps within bilateral maxillary sinuses. Evidence of prior limited sinus surgery with scar and obstruction of residual sinuses bilaterally. Mucosal edema and inflammation throughout sinonasal cavities bilaterally. Gross polyposis limited to within bilateral maxillary sinuses associated with entrapped infection and purulent debris. Residual uncinate process scarred over maxillary antrostomies. Culture obtained from left maxillary of thick purulent drainage. Directed septoplasty revision with correction of biphasic deviation and quilt suture reduction of septal swell bodies. All paranasal sinuses widely patent after surgery. Steroid eluting stents placed in bilateral ethmoid cavities extending from frontal recess to sphenoid face.     Estimated Blood Loss: <100 cc         Specimens (From admission, onward)     Start     Ordered    02/11/20 1002  Specimen to Pathology, Surgery ENT  Once     Question Answer Comment   Procedure Type: ENT    Specimen Class: Routine/Screening        02/11/20 1002                Disposition:   The patient did well with surgery and there were no complications. he was extubated and taken to PACU in anticipation of discharge to home for this planned outpatient same day procedure. Post-operative orders were placed and prescriptions provided for post-operative medications.    Additional Information and Post-op  Instructions:   For additional information and answers to common questions regarding the procedures performed, please see the patient instructions I have included in the discharge materials for today's surgery.      Clifton Angel MD    Rhinology, Allergy, and Sinus-Skull Base Surgery    Department of Otorhinolaryngology    [facility]    Phone  [phone]    Fax      [phone]

## 2020-02-11 NOTE — ANESTHESIA PREPROCEDURE EVALUATION
02/11/2020  Stanley Faustin is a 59 y.o., male.    Anesthesia Evaluation         Review of Systems  Anesthesia Hx:  No problems with previous Anesthesia   Social:  Non-Smoker    Hematology/Oncology:  Hematology Normal   Oncology Normal     EENT/Dental:EENT/Dental Normal   Cardiovascular:   Hypertension Past MI CAD   Stent 10 yrs ago..mi prior to..  4 vessel bypass 2019 no prob  Can walk mult flights of stairs no sob or cp..  4-19 echo 55perc   Pulmonary:  Pulmonary Normal    Renal/:   Chronic Renal Disease    Hepatic/GI:  Hepatic/GI Normal    Musculoskeletal:  Musculoskeletal Normal    Neurological:   Headaches    Endocrine:   Diabetes    Dermatological:  Skin Normal    Psych:  Psychiatric Normal           Physical Exam  General:  Well nourished    Airway/Jaw/Neck:  Airway Findings: Mallampati: II TM Distance: < 4 cm      Dental:  DENTAL FINDINGS: Normal   Chest/Lungs:  Chest/Lungs Clear    Heart/Vascular:  Heart Findings: Normal       Mental Status:  Mental Status Findings:  Cooperative, Alert and Oriented         Anesthesia Plan  Type of Anesthesia, risks & benefits discussed:  Anesthesia Type:  general  Patient's Preference:   Intra-op Monitoring Plan: standard ASA monitors  Intra-op Monitoring Plan Comments:   Post Op Pain Control Plan: multimodal analgesia, IV/PO Opioids PRN and per primary service following discharge from PACU  Post Op Pain Control Plan Comments:   Induction:    Beta Blocker:  Patient is not currently on a Beta-Blocker (No further documentation required).       Informed Consent: Patient understands risks and agrees with Anesthesia plan.  Questions answered. Anesthesia consent signed with patient.  ASA Score: 2     Day of Surgery Review of History & Physical:    H&P update referred to the provider.  H&P completed by Anesthesiologist.   Anesthesia Plan Notes: Npo  surgeion arrived  7:20        Ready For Surgery From Anesthesia Perspective.

## 2020-02-11 NOTE — PROGRESS NOTES
Chest x ray and EKG complete. Dr Davis at bedside to review.   500 ml fluid bolus ordered and in progress.   Hand off report to Ritika GARBER RN

## 2020-02-11 NOTE — DISCHARGE SUMMARY
Outpatient surgery discharge summary:    Admit Date (Surgery Date): 2/11/2020    Discharge Date: 2/11/2020    Hospital Course: Surgery performed as planned without complications. Procedures tolerated well and sent to recovery in stable condition with plan to discharge to home.     Final Diagnosis: Post-Op Diagnosis Codes:     * Chronic pansinusitis [J32.4]    Disposition: Home/self care    Follow Up/Patient Instructions: Provided in patient instructions     Medications:  See prescriptions  Discharge Procedure Orders   Diet general     Keep surgical extremity elevated     Other restrictions (specify):   Order Comments: Do NOT blow your nose for 2 weeks. If you have to sneeze or cough, do so with your mouth open. Allow drainage from nose to fall on a mustache dressing (gauze placed under nose) and change it as needed, or gently wipe outside of nose without blowing.  AVOID all heavy lifting, straining or bending for 2 weeks.   AVOID any sexual activity for 2 weeks after surgery.  AVOID semi-contact sports or vigorous exercising for 3-4 weeks. Dr. Angel will let you know when you are cleared to resume exercise.  AVOID flying or swimming for 2 weeks.    Do NOT operate a motor vehicle or any type of heavy machinery within 24 hours of taking pain medication.  DO NOT smoke or be around smokers.  AVOID irritating substances that might make you sneeze, such as dust, chalk, harsh chemicals, and allergic triggers.  This might also include spicy foods.     Wound care routine (specify)   Order Comments: Wound care routine: See discharge instructions for sinus and nasal surgery     Follow-up Information     Clifton Angel MD.    Specialty:  Otolaryngology  Contact information:  [address]  [address]  [place] [id number] [phone]

## 2020-02-11 NOTE — PROGRESS NOTES
Patient with perioperative hypotension..initially did not bolus due to cardiac history..patient taking lisinipril..tx with neosynephrine and fluid later in case..post op accucheck was 280 and patient gave hx of excessive urination last few days..cxr no pulmonary edema..ekg no new changes from preop..no urination in pacu..will fluid bolus

## 2020-02-11 NOTE — TELEPHONE ENCOUNTER
----- Message from Ayleen Godinez sent at 2/11/2020 11:27 AM CST -----  Contact: Shikha Laird)  Name of Who is Calling : Shikha Laird)    Shikha Laird) is requesting a call from staff in regards to the ondansetron (ZOFRAN-ODT) 8 MG TbDL states direction does not match the amount of tablets provided and needing clarity   .....Please contact to further discuss and advise.    Can the clinic reply by [facility] : No    What Number to Call Back :  [phone]

## 2020-02-11 NOTE — TRANSFER OF CARE
"Anesthesia Transfer of Care Note    Patient: Stanley Faustin    Procedure(s) Performed: Procedure(s) (LRB):  SEPTOPLASTY, NOSE, ENDOSCOPIC (N/A)  EXCISION, NASAL TURBINATE, SUBMUCOSAL (Bilateral)  FESS, USING COMPUTER-ASSISTED NAVIGATION (Bilateral)    Patient location: PACU    Anesthesia Type: general    Transport from OR: Transported from OR on 6-10 L/min O2 by face mask with adequate spontaneous ventilation    Post pain: adequate analgesia    Post assessment: no apparent anesthetic complications    Post vital signs: stable    Level of consciousness: awake and alert    Nausea/Vomiting: no nausea/vomiting    Complications: none    Transfer of care protocol was followed      Last vitals:   Visit Vitals  /62   Pulse 110   Temp 36.7 °C (98 °F)   Resp 14   Ht 5' 10" (1.778 m)   Wt 89.6 kg (197 lb 8.5 oz)   SpO2 97%   BMI 28.34 kg/m²     "

## 2020-02-12 ENCOUNTER — TELEPHONE (OUTPATIENT)
Dept: OTOLARYNGOLOGY | Facility: CLINIC | Age: 60
End: 2020-02-12

## 2020-02-12 NOTE — OP NOTE
Patient: Stanley Faustin  MRN: [id number]    DOS: 2/11/2020    Attending Surgeon: Clifton Angel MD    OPERATIVE REPORT    Preoperative Diagnoses:   1. Chronic maxillary sinusitis, bilateral.  2. Chronic ethmoid sinusitis, bilateral.  3. Chronic sphenoid sinusitis, bilateral.  4. Chronic frontal sinusitis, bilateral.  5. Nasal septal deviation  6. Suppurative maxillary sinusitis with polyposis, bilateral.    Postoperative Diagnoses:   1. Chronic maxillary sinusitis, bilateral.  2. Chronic ethmoid sinusitis, bilateral.  3. Chronic sphenoid sinusitis, bilateral.  4. Chronic frontal sinusitis, bilateral.  5. Nasal septal deviation  6. Suppurative maxillary sinusitis with polyposis, bilateral.    Procedures Performed:   1. Bilateral revision endoscopic maxillary antrostomy with removal of polyps and debris from the maxillary sinuses.  2. Bilateral revision endoscopic total ethmoidectomy.  3. Bilateral endoscopic sphenoidotomy.  4. Bilateral endoscopic frontal sinusotomy.  5. CT stereotactic navigational surgery.  7. Septoplasty.    Indications: Stanley Faustin is a 59 y.o. male with a history of chronic sinusitis. He has had symptomatic and clinically evident progression of refectory chronic sinusitis with purulent drainage from the bilateral maxillary sinuses despite previous sinonasal surgery and ongoing medical management. Findings of chronic sinonasal inflammation with polyposis have been identified on examination with sinonasal endoscopy as well as CT imaging. Revision endoscopic sinus surgery with nasal septoplasty was indicated to treat sinonasal obstruction and restore drainage of the paranasal sinuses, remove retained inflammatory disease, and permit access for administration of topical therapies during ongoing medical management of chronic sinusitis. CT stereotactic navigation was indicated given the proximity of planned surgical procedures to the orbit and the cranial base and given the thoroughness  and revision nature of the surgery planned. Risks, benefits, and alternatives were discussed with the patient and all questions were answered before he decided to proceed with these surgical procedures.    Findings: Mr. Faustin had evidence of significant edema throughout the ethmoid cavities Gross intrasinus polyps within the bilateral maxillary sinuses. Purulent material and polyps within bilateral maxillary sinuses. Evidence of prior limited sinus surgery with scar and obstruction of residual sinuses bilaterally. Mucosal edema and inflammation throughout sinonasal cavities bilaterally. Gross polyposis limited to within bilateral maxillary sinuses associated with entrapped infection and purulent debris. Residual uncinate process scarred over maxillary antrostomies. Culture obtained from left maxillary of thick purulent drainage. Directed septoplasty revision with correction of biphasic deviation and quilt suture reduction of septal swell bodies. All paranasal sinuses widely patent after surgery. Steroid eluting stents placed in bilateral ethmoid cavities extending from frontal recess to sphenoid face. No significant osteitis was present.     This is a 22 modifier procedure for the endoscopic ethmoidectomy/maxillary antrostomy bilaterally given the significant extent of disease and revision nature of the case which involved significantly greater technical challenge, work, and operative time than is usually associated with these procedures. Residual uncinate process was scarred over the maxillary antrostomies and the orbit requiring careful separation of these tissue planes in important relationship to the orbits laterally. Significant scar and thickened bone of the ethmoids led to a more challenging dissection along the skull base and greater challenge to achieving a complete ethmoidectomy and ensuring all ethmoid cells were opened completely to the nasal cavity by meticulous resection of their lamellae. The  polyposis and purulent debris, in particular, was worse during the surgery today compared to as seen on pre-operative CT scan.     Specimens: Sinus contents sent as separate specimens from the left and right sides for pathologic evaluation.    Cultures: Left maxillary sinus (purulence from within left maxillary sinus)    Estimated Blood Loss: < 100 mL.    Complications: None apparent.    Description of Procedure:   The patient was identified in the holding area and taken to the operating room, where he was placed in the supine position, and induced under general anesthesia per Anesthesiology. A formal surgical pause was held, and all aspects were addressed. The CT stereotactic navigational surgical system was placed about the patient's head, registered to appropriate level of accuracy, and verified using known anatomic landmarks. It was used throughout the case to help identify critical structures, including the orbit and the cranial base, given the thoroughness and revision nature of the surgery planned.     The nose was topically decongested with 1:1,000 epinephrine (dyed with fluorescein) on neuropledgets. Patient was draped in the normal fashion. Zero and 30-degree endoscopes were used as indicated throughout the procedures. Injections of local anesthetic using 1% lidocaine with 1:100,000 epinephrine were performed in the nasal cavity bilaterally.     Starting the revision endoscopic sinus surgery on the left side. The left middle turbinate was carefully medialized and residual uncinate process was taken down carefully. Scarred uncinate tissue and bone was carefully  from the lamina and overlying mucosa. The natural ostium of the maxillary sinus was identified, enlarged inferiorly-posteriorly, and polyps and purulence were removed through this revision maxillary antrostomy. Purulence from the left maxillary was obtained and sent as a culture specimen. Residual ethmoid bulla was taken down to the lamina  papyracea and the basal lamella of the middle turbinate. The basal lamella was penetrated, revealing the superior turbinate, sphenoethmoid recess, and posterior ethmoids. The posterior ethmoids were entered and the posterior ethmoid skull base was identified. The inferior 2 mm of the superior turbinate was taken down. The sphenoid ostium was cannulated  and sphenoidotomy performed with a through-cutting punch. Total ethmoidectomy was completed working from posterior to anterior, taking down bony lamellae of the ethmoid sinuses, skeletonizing the skull base and lamina papyracea in a mucosal-sparing fashion to the frontal recess. There was significant osteitis, scar, and edema throughout the ethmoid sinuses requiring meticulous dissection with through-cutting instruments. In the frontal recess the agger nasi cell was taken down. The frontal sinus ostium was cannulated, and extended anteriorly using through cutting instruments to take down portions of the frontal beak, creating the frontal sinusotomy. The left middle turbinate was also partially resected given its scar and tissue adjoining it to and obstructing ethmoid sinuses. The posterior aspect was cauterized with a suction electrosurgical device. The left sinonasal cavities were irrigated with warm normal saline solution, and a curved suction was used to thoroughly irrigate the frontal sinus in a similar fashion to remove all additional debris and retained contents.    Attention was turned to the right-sided sinus surgery which was performed in similar fashion to the left side. The right middle turbinate was carefully medialized and residual uncinate process was taken down carefully. Scarred uncinate tissue and bone was carefully  from the lamina and overlying mucosa. The natural ostium of the maxillary sinus was identified, enlarged inferiorly-posteriorly, and polyps and purulence were removed through this revision maxillary antrostomy. Residual ethmoid  bulla was taken down to the lamina papyracea and the basal lamella of the middle turbinate. The basal lamella was penetrated, revealing the superior turbinate, sphenoethmoid recess, and posterior ethmoids. The posterior ethmoids were entered and the posterior ethmoid skull base was identified. The inferior 2 mm of the superior turbinate was taken down. The sphenoid ostium was cannulated  and sphenoidotomy performed with a through-cutting punch. Total ethmoidectomy was completed working from posterior to anterior, taking down bony lamellae of the ethmoid sinuses, skeletonizing the skull base and lamina papyracea in a mucosal-sparing fashion to the frontal recess. There was significant osteitis, scar, and edema throughout the ethmoid sinuses requiring meticulous dissection with through-cutting instruments. In the frontal recess the agger nasi cell was taken down. The frontal sinus ostium was cannulated, and extended anteriorly using through cutting instruments to take down portions of the frontal beak, creating the frontal sinusotomy. The right middle turbinate was partially resected on this side as well, again given its scar and tissue adjoining it to and obstructing ethmoid sinuses. The posterior aspect was cauterized with a suction electrosurgical device. The right sinonasal cavities were irrigated with warm normal saline solution. A curved suction was placed into the frontal sinus and used to thoroughly irrigated the sinus with warm normal saline in a similar fashion to remove all additional debris and retained contents.    Septoplasty was then performed to address sources of obstructing from nasal septal deflections. Injections of local anesthetic with 1:100,000 epinephrine were performed at the nasal septal mucosa anteriorly on the left side. Septoplasty was addressed via a directed incision created in the left anterior septal mucosa. A left mucoperichondrial flap was elevated and a Douglas elevator used to  incise the septal cartilage anteriorly and raise a mucoperichondrial flap on the contralateral side. The bony-cartilaginous junction was disarticulated and offending portions of cartilage and bone were resected taking great care to spare dorsal and caudal struts. The septum was quilt sutured using 4-0 plain gut suture, taking care to quilt through the area of widened septal body tissue to narrow this region in particular. Excellent improvement in the airway was noted bilaterally.     All debris was removed from the sinonasal cavities. Hemostasis was verified bilaterally. A steroid eluting bioabsorbable stent was placed extending from the the frontal recess into the ethmoid cavity bilaterally. Chitin dressings were then placed in the ethmoid cavities bilaterally, maintaining the surgically created space within the ethmoid cavity and osteomeatal complex, and covering mucosal edges with excellent hemostasis. Patient was returned to the care of Anesthesia for awakening.    Disposition and Postoperative Care:  Plan is for discharge to home as the procedures were tolerated well with no complications or adverse events. Postoperative care instructions have been provided as well as prescriptions for postoperative medications. Written instructions were provided, which were also reviewed with the patient pre-operatively, and they were encouraged to call for additional questions or concerns.    Postoperative endoscopic debridement is planned at one week and two weeks following date of surgery, with a possible third debridement depending on post-operative recovery and findings. This is necessary to remove crusting/necrotic tissue, blood clots and retained secretions that may lead to adverse scarring, infection, and prolonged healing. Failure to perform adequate postop debridement following endoscopic sinus surgery is known to result in suboptimal healing, scarring and poor surgical outcomes. Determination of additional or  continued debridements will be determined during the initial postoperative evaluations, or earlier in situation of a complication arising before those appointments.    22-modifier statement: See above statement under 'findings' section.      Clifton Angel MD    Rhinology, Allergy, and Sinus-Skull Base Surgery    Department of Otorhinolaryngology    [facility]    Phone  [phone]    Fax      [phone]

## 2020-02-12 NOTE — TELEPHONE ENCOUNTER
Patient calling today regarding his FMLA paper work, does not want it to state: can return to full activity/ work two weeks post-op. May return sooner with restrictions of no heavy lifting or strenuous activity.    Patient states that if he turns this in they can put him on desk work as soon as tomorrow. He would prefer it to say:  can return to full activity/ work two weeks post-op.

## 2020-02-13 LAB — BACTERIA SPEC AEROBE CULT: NORMAL

## 2020-02-14 ENCOUNTER — PATIENT MESSAGE (OUTPATIENT)
Dept: OTOLARYNGOLOGY | Facility: CLINIC | Age: 60
End: 2020-02-14

## 2020-02-14 ENCOUNTER — TELEPHONE (OUTPATIENT)
Dept: OTOLARYNGOLOGY | Facility: CLINIC | Age: 60
End: 2020-02-14

## 2020-02-14 LAB
FINAL PATHOLOGIC DIAGNOSIS: NORMAL
GROSS: NORMAL

## 2020-02-14 NOTE — TELEPHONE ENCOUNTER
----- Message from Jannet Flores sent at 2/14/2020 10:25 AM CST -----  Contact: Self   Type: Patient Call Back    Who called: Self   What is the request in detail:  Patient is asking to speak with the office   Can the clinic reply by [facility]?  Call   Wuld the patient rather a call back or a response via [facility]? Call   Best call back number: [phone]      Additional Information:

## 2020-02-14 NOTE — TELEPHONE ENCOUNTER
Spoke with Patient and he said he did not receive the letter that was sent yesterday by Cindy.  Copied the letter and Patient will  if he does not receive the letter again that was sent to his portal today.

## 2020-02-19 LAB — BACTERIA SPEC ANAEROBE CULT: ABNORMAL

## 2020-02-20 ENCOUNTER — OFFICE VISIT (OUTPATIENT)
Dept: OTOLARYNGOLOGY | Facility: CLINIC | Age: 60
End: 2020-02-20
Payer: COMMERCIAL

## 2020-02-20 VITALS
HEIGHT: 70 IN | BODY MASS INDEX: 27.4 KG/M2 | SYSTOLIC BLOOD PRESSURE: 100 MMHG | WEIGHT: 191.38 LBS | DIASTOLIC BLOOD PRESSURE: 70 MMHG

## 2020-02-20 DIAGNOSIS — J32.4 CHRONIC PANSINUSITIS: Primary | ICD-10-CM

## 2020-02-20 DIAGNOSIS — Z98.890 STATUS POST FUNCTIONAL ENDOSCOPIC SINUS SURGERY (FESS): ICD-10-CM

## 2020-02-20 DIAGNOSIS — J34.89 NASAL OBSTRUCTION: ICD-10-CM

## 2020-02-20 DIAGNOSIS — R43.9 DISTURBANCE OF SMELL: ICD-10-CM

## 2020-02-20 PROCEDURE — 99213 OFFICE O/P EST LOW 20 MIN: CPT | Mod: 25,24,S$GLB, | Performed by: OTOLARYNGOLOGY

## 2020-02-20 PROCEDURE — 99213 PR OFFICE/OUTPT VISIT, EST, LEVL III, 20-29 MIN: ICD-10-PCS | Mod: 25,24,S$GLB, | Performed by: OTOLARYNGOLOGY

## 2020-02-20 PROCEDURE — 31237 PR NASAL/SINUS ENDOSCOPY,BX/RMV POLYP/DEBRID: ICD-10-PCS | Mod: 50,79,S$GLB, | Performed by: OTOLARYNGOLOGY

## 2020-02-20 PROCEDURE — 31237 NSL/SINS NDSC SURG BX POLYPC: CPT | Mod: 50,79,S$GLB, | Performed by: OTOLARYNGOLOGY

## 2020-02-20 NOTE — PROGRESS NOTES
"  Subjective:      Stanley Faustin is a 59 y.o. male who comes for follow-up 1 week status-post endoscopic sinus surgery. His last clinic visit with me was on 1/20/2020.  He has been doing well overall without significant issues. He denies abnormal discharge, pain, or unusual bleeding at this point. Some anticipated level of severity of post-operative pain with location of operative site of nose and paranasal sinuses has been present, with duration since time of surgery, and modifying factors include response to minimal analgesic use. He has taken post-operative medications as prescribed, adhered to post-operative instructions including activity restrictions, and has performed recommended post-operative cares including nasal saline irrigations. Recovery has overall followed an expected course for the procedures performed and no other new or unusual associated symptoms are present.     Global QOL assessment ("overall, how do you feel today?" from 1 "very bad" to 10 "very good"): 5    Surgical procedures performed on 2/11/20:  1. Bilateral revision endoscopic maxillary antrostomy with removal of polyps and debris from the maxillary sinuses.  2. Bilateral revision endoscopic total ethmoidectomy.  3. Bilateral endoscopic sphenoidotomy.  4. Bilateral endoscopic frontal sinusotomy.  5. CT stereotactic navigational surgery.  7. Septoplasty.    Findings at time of surgery:  Mr. Faustin had evidence of significant edema throughout the ethmoid cavities Gross intrasinus polyps within the bilateral maxillary sinuses. Purulent material and polyps within bilateral maxillary sinuses. Evidence of prior limited sinus surgery with scar and obstruction of residual sinuses bilaterally. Mucosal edema and inflammation throughout sinonasal cavities bilaterally. Gross polyposis limited to within bilateral maxillary sinuses associated with entrapped infection and purulent debris. Residual uncinate process scarred over maxillary " antrostomies. Culture obtained from left maxillary of thick purulent drainage. Directed septoplasty revision with correction of biphasic deviation and quilt suture reduction of septal swell bodies. All paranasal sinuses widely patent after surgery. Steroid eluting stents placed in bilateral ethmoid cavities extending from frontal recess to sphenoid face. No significant osteitis was present.     Past Medical History  He has a past medical history of Cluster headaches, Coronary artery disease, Diabetes mellitus, Diabetes mellitus type II, Headache, High cholesterol, Hyperlipidemia, Hypertension, and Myocardial infarction.    Past Surgical History  He has a past surgical history that includes Coronary angioplasty with stent; Tonsillectomy; Uvulopalatopharyngoplasty; Reconstruction of nose (Bilateral, 6/26/2018); Turbinate resection (Bilateral, 6/26/2018); Functional endoscopic sinus surgery (FESS) using computer-assisted navigation (Bilateral, 6/26/2018); left shoulder; pr cabg, artery-vein, four (04/12/2019); Endoscopic nasal septoplasty (N/A, 2/11/2020); and Functional endoscopic sinus surgery (FESS) using computer-assisted navigation (Bilateral, 2/11/2020).    Allergies  He has No Known Allergies.    Medications   He has a current medication list which includes the following prescription(s): amoxicillin-clavulanate 875-125mg, aspirin, atorvastatin, blood sugar diagnostic, blood-glucose meter, clotrimazole-betamethasone 1-0.05%, diclofenac sodium, ibuprofen, insulin degludec, lancets, lisinopril, metformin, metoprolol tartrate, nateglinide, nicotine polacrilex, nitroglycerin, oxycodone, prednisone, semaglutide, and sildenafil.    Review of Systems  Previous ROS reviewed and updated as per HPI with pertinent positives and negatives including:  Eyes: No change in vision, including no new double vision  Nose: Post-operative changes as per HPI, no other new symptoms present  Hematologic: No unusual bleeding  Respiratory:  "No respiratory distress, nasal breathing changes as per HPI  Neuro: Post-operative pain as per HPI, no other acute neurologic changes    New complete ROS performed today:  Review of Systems   Constitutional: Negative.    HENT: Positive for sinus pressure. Negative for hoarse voice.    Eyes: Negative.    Respiratory: Negative.    Cardiovascular: Negative.         Heart Problems, Hypertension   Gastrointestinal: Negative.    Endocrine: Negative.         Diabetes   Genitourinary: Negative.    Musculoskeletal: Negative.    Skin: Negative.    Allergic/Immunologic: Negative.    Neurological: Negative.    Hematological: Negative.    Psychiatric/Behavioral: Positive for sleep disturbance.       Objective:     /70 (BP Location: Right arm, Patient Position: Sitting, BP Method: Small (Manual))   Ht 5' 10" (1.778 m)   Wt 86.8 kg (191 lb 5.8 oz)   BMI 27.46 kg/m²        Constitutional:   He appears well-developed and well-nourished. He is active. He does not appear ill. No distress. Normal speech.  No hoarse voice and breathy voice.      Head:  Normocephalic and atraumatic. No skin lesions. Facial strength is normal.      Ears:    Right Ear: No drainage, swelling or tenderness. No decreased hearing is noted.   Left Ear: No drainage, swelling or tenderness. No decreased hearing is noted.     Nose:  Mucosal edema (mucosal edema consistent with recent sinonasal procedures and appropriate for post-operative course) and sinus tenderness (tenderness appropriate for post-operative recovery from recent sinonasal surgery) present. No rhinorrhea, septal deviation, nasal septal hematoma or polyps. No epistaxis (no active epistaxis is present, some old blood/crusting related to recent surgery). No turbinate hypertrophy.      Mouth/Throat  Oropharynx clear and moist without lesions or asymmetry and normal uvula midline. No oral lesions. No oropharyngeal exudate, posterior oropharyngeal edema or posterior oropharyngeal erythema. "     Neck:  Neck normal without thyromegaly masses, asymmetry, normal tracheal structure, crepitus, and tenderness, phonation normal and full range of motion with neck supple. No edema, no erythema and no neck rigidity present.     Pulmonary/Chest:   Effort normal.     Psychiatric:   He has a normal mood and affect. His speech is normal and behavior is normal.     Neurological:   He has neurological normal, alert and oriented. No cranial nerve deficit or sensory deficit.     Skin:   No abrasions, lacerations, lesions, or rashes.       Procedure    Endoscopic debridement performed.  See procedure note for details. Crusting, post-operative debris, and retained secretions were removed as planned and indicated for recent endoscopic sinonasal surgery.                         Data Reviewed    Pathology report indicated chronic inflammation with rare eosinophils.      Part 1   Fragments of respiratory mucosa and cancellous bone (submitted as left sinus   contents):   -Chronic inflammation with lymphoid aggregate formation and fibrosis within   respiratory mucosa.  Eosinophils focally number greater than 30 per   high-power field.   -No intrinsic histopathologic abnormalities of bone   -Clinical correlation is necessary     Part 2   Fragments of respiratory mucosa and cancellous bone (submitted as right sinus   contents):   -Moderate chronic inflammation with early lymphoid aggregate formation   present within respiratory mucosa.   -No eosinophils are identified   -No intrinsic histopathologic abnormalities of bone     Operative report reviewed and procedures performed as well as key operative findings are described in history above.      Assessment:     Doing well following bilateral endoscopic sinus surgery.  He also underwent septum/turbinate surgery which is unrelated to the reason for today's visit.    1. Chronic pansinusitis    2. Nasal obstruction    3. Disturbance of smell    4. Status post functional endoscopic  sinus surgery (FESS)         Plan:     We discussed post-operative cares following surgery and ongoing medical management. Importance of scheduled debridements to remove crusting, debris, and potential scar tissue was reviewed again today, as this is associated with optimal healing and overall improved surgical outcome. Importance of medical therapies following surgery were also reviewed including saline irrigations. All questions were answered. He will continue with medical management consisting of nasal saline irrigations and topical nasal steroid spray. We will continue to follow his response to surgery and this ongoing medical therapy. Follow-up is scheduled. I encouraged him to call with any questions or concerns in the meantime.          Clifton Angel MD    Rhinology, Allergy, and Sinus-Skull Base Surgery    Department of Otorhinolaryngology    [facility]    Phone  [phone]    Fax      [phone]

## 2020-02-21 NOTE — PROCEDURES
"Nasal/sinus endoscopy  Date/Time: 2/20/2020 10:00 AM    Time out: Immediately prior to procedure a "time out" was called to verify the correct patient, procedure, equipment, support staff and site/side marked as required.  Performed by: Clifton Angel MD  Authorized by: Clifton Angel MD     Consent Done?:  Yes (Verbal)  Anesthesia:     Local anesthetic:  4% Xylocaine spray with Harjit-Synephrine    Location: Bilateral.    Patient tolerance:  Patient tolerated the procedure well with no immediate complications  Nose:     Nasal Endoscopy Performed: Endoscopic sinonasal debridement.  External:      No external nasal deformity  Intranasal:      Mucosa no polyps     Mucosa ulcers not present     Mucosa lesions present (crusting from recent surgery)     Turbinates not enlarged     No septum gross deformity  Nasopharynx:      No mucosa lesions     Adenoids not present     Posterior choanae patent     Eustachian tube patent     Procedure Description: The rigid nasal endoscopy was used to assist with debridement of the sinonasal cavities as part of necessary postoperative care. Failure to perform adequate postoperative debridement following endoscopic sinus surgery is known to result in poor healing, scarring and worse surgical outcomes, and thus indicated to promote optimal results from endoscopic sinus surgery. Informed consent was obtained prior to proceeding.     The nasal cavity was decongested and anesthetized.  A rigid nasal endoscope was introduced into the nasal cavity and used to evaluate the sinonasal cavities, mucosa, sinus ostia and turbinates bilaterally. Crusting/necrotic tissue was removed with forceps from the osteomeatal complex bilaterally. Additional debris/necrotic material,blood clots and retained secretions were removed from the nasal cavities and paranasal sinuses bilaterally. The patient tolerated the procedure well and there were no complications/adverse events.     Summary of Findings:   " Inferior turbinates and septum intact, with no adverse synechiae present.  Ethmoid cavities debrided of crusted debris and widely patent following debridement. Steroid eluting stent removed on right side and left in place on the left.   Frontal sinus outflow tract crusting debrided and patent bilaterally with no polyps or obstruction.    Overall Assessment: Successful debridement and evaluation with endoscopy. Recovering well following endoscopic sinus surgery with expected post-operative findings.

## 2020-02-27 ENCOUNTER — OFFICE VISIT (OUTPATIENT)
Dept: OTOLARYNGOLOGY | Facility: CLINIC | Age: 60
End: 2020-02-27
Payer: COMMERCIAL

## 2020-02-27 VITALS
SYSTOLIC BLOOD PRESSURE: 110 MMHG | WEIGHT: 195.19 LBS | HEIGHT: 70 IN | DIASTOLIC BLOOD PRESSURE: 60 MMHG | BODY MASS INDEX: 27.94 KG/M2

## 2020-02-27 DIAGNOSIS — Z98.890 STATUS POST FUNCTIONAL ENDOSCOPIC SINUS SURGERY (FESS): ICD-10-CM

## 2020-02-27 DIAGNOSIS — J34.89 NASAL OBSTRUCTION: ICD-10-CM

## 2020-02-27 DIAGNOSIS — R43.9 DISTURBANCE OF SMELL: ICD-10-CM

## 2020-02-27 DIAGNOSIS — J34.89 NASAL CRUSTING: ICD-10-CM

## 2020-02-27 DIAGNOSIS — J32.4 CHRONIC PANSINUSITIS: Primary | ICD-10-CM

## 2020-02-27 PROCEDURE — 99213 PR OFFICE/OUTPT VISIT, EST, LEVL III, 20-29 MIN: ICD-10-PCS | Mod: 25,24,S$GLB, | Performed by: OTOLARYNGOLOGY

## 2020-02-27 PROCEDURE — 31237 NSL/SINS NDSC SURG BX POLYPC: CPT | Mod: 50,79,S$GLB, | Performed by: OTOLARYNGOLOGY

## 2020-02-27 PROCEDURE — 31237 PR NASAL/SINUS ENDOSCOPY,BX/RMV POLYP/DEBRID: ICD-10-PCS | Mod: 50,79,S$GLB, | Performed by: OTOLARYNGOLOGY

## 2020-02-27 PROCEDURE — 99213 OFFICE O/P EST LOW 20 MIN: CPT | Mod: 25,24,S$GLB, | Performed by: OTOLARYNGOLOGY

## 2020-02-27 NOTE — PROGRESS NOTES
"  Subjective:      Stanley Faustin is a 59 y.o. male who comes for follow-up 2 weeks status-post endoscopic sinus surgery. His last clinic visit with me was on 2/20/2020.  He has been doing well overall without significant issues. He denies abnormal discharge, pain, or unusual bleeding at this point.  He has taken post-operative medications as prescribed, adhered to post-operative instructions including activity restrictions, and has performed recommended post-operative cares including nasal saline irrigations. In general feels that he has had much improvement and good results from surgery and post-operative medical therapy. Recovery has overall followed an expected course for the procedures performed and no other new or unusual associated symptoms are present.     SNOT-22 score = 20  NOSE score = 4    Global QOL assessment ("overall, how do you feel today?" from 1 "very bad" to 10 "very good"): 8    Surgical procedures performed on 2/11/20:  1. Bilateral revision endoscopic maxillary antrostomy with removal of polyps and debris from the maxillary sinuses.  2. Bilateral revision endoscopic total ethmoidectomy.  3. Bilateral endoscopic sphenoidotomy.  4. Bilateral endoscopic frontal sinusotomy.  5. CT stereotactic navigational surgery.  7. Septoplasty.     Findings at time of surgery:  Mr. Faustin had evidence of significant edema throughout the ethmoid cavities Gross intrasinus polyps within the bilateral maxillary sinuses. Purulent material and polyps within bilateral maxillary sinuses. Evidence of prior limited sinus surgery with scar and obstruction of residual sinuses bilaterally. Mucosal edema and inflammation throughout sinonasal cavities bilaterally. Gross polyposis limited to within bilateral maxillary sinuses associated with entrapped infection and purulent debris. Residual uncinate process scarred over maxillary antrostomies. Culture obtained from left maxillary of thick purulent drainage. Directed " septoplasty revision with correction of biphasic deviation and quilt suture reduction of septal swell bodies. All paranasal sinuses widely patent after surgery. Steroid eluting stents placed in bilateral ethmoid cavities extending from frontal recess to sphenoid face. No significant osteitis was present.     Past Medical History  He has a past medical history of Cluster headaches, Coronary artery disease, Diabetes mellitus, Diabetes mellitus type II, Headache, High cholesterol, Hyperlipidemia, Hypertension, and Myocardial infarction.    Past Surgical History  He has a past surgical history that includes Coronary angioplasty with stent; Tonsillectomy; Uvulopalatopharyngoplasty; Reconstruction of nose (Bilateral, 6/26/2018); Turbinate resection (Bilateral, 6/26/2018); Functional endoscopic sinus surgery (FESS) using computer-assisted navigation (Bilateral, 6/26/2018); left shoulder; pr cabg, artery-vein, four (04/12/2019); Endoscopic nasal septoplasty (N/A, 2/11/2020); and Functional endoscopic sinus surgery (FESS) using computer-assisted navigation (Bilateral, 2/11/2020).    Allergies  He has No Known Allergies.    Medications   He has a current medication list which includes the following prescription(s): aspirin, atorvastatin, blood sugar diagnostic, blood-glucose meter, clotrimazole-betamethasone 1-0.05%, diclofenac sodium, ibuprofen, insulin degludec, lancets, lisinopril, metformin, metoprolol tartrate, nateglinide, nicotine polacrilex, nitroglycerin, oxycodone, prednisone, semaglutide, and sildenafil.    Review of Systems  Previous ROS reviewed and updated as per HPI with pertinent positives and negatives including:  Eyes: No change in vision, including no new double vision  Nose: Post-operative changes as per HPI, no other new symptoms present  Hematologic: No unusual bleeding  Respiratory: No respiratory distress, nasal breathing changes as per HPI  Neuro: Post-operative pain as per HPI, no other acute  "neurologic changes    New complete ROS performed today:  Review of Systems   Constitutional: Negative.    HENT: Positive for sinus pressure. Negative for hoarse voice.    Eyes: Negative.    Respiratory: Positive for apnea.         Snoring   Cardiovascular: Negative.         Heartburn, Heart Problems, Hypertension   Gastrointestinal: Negative.    Endocrine: Negative.         Diabetes   Genitourinary: Negative.    Musculoskeletal: Negative.    Skin: Negative.    Allergic/Immunologic: Negative.    Neurological: Positive for headaches.   Hematological: Negative.    Psychiatric/Behavioral: Positive for sleep disturbance.       Objective:     /60 (BP Location: Left arm, Patient Position: Sitting, BP Method: Small (Manual))   Ht 5' 10" (1.778 m)   Wt 88.6 kg (195 lb 3.5 oz)   BMI 28.01 kg/m²        Constitutional:   He appears well-developed and well-nourished. He is active. He does not appear ill. No distress. Normal speech.  No hoarse voice and breathy voice.      Head:  Normocephalic and atraumatic. No skin lesions. Facial strength is normal.      Ears:    Right Ear: No drainage, swelling or tenderness. No decreased hearing is noted.   Left Ear: No drainage, swelling or tenderness. No decreased hearing is noted.     Nose:  Mucosal edema (mucosal edema consistent with recent sinonasal procedures and appropriate for post-operative course) present. No rhinorrhea, sinus tenderness, septal deviation, nasal septal hematoma or polyps. No epistaxis (no active epistaxis is present, some old blood/crusting related to recent surgery). No turbinate hypertrophy.      Mouth/Throat  Oropharynx clear and moist without lesions or asymmetry and normal uvula midline. No oral lesions. No oropharyngeal exudate, posterior oropharyngeal edema or posterior oropharyngeal erythema.     Neck:  Neck normal without thyromegaly masses, asymmetry, normal tracheal structure, crepitus, and tenderness, phonation normal and full range of motion " with neck supple. No edema, no erythema and no neck rigidity present.     Pulmonary/Chest:   Effort normal.     Psychiatric:   He has a normal mood and affect. His speech is normal and behavior is normal.     Neurological:   He has neurological normal, alert and oriented. No cranial nerve deficit or sensory deficit.     Skin:   No abrasions, lacerations, lesions, or rashes.       Procedure    Endoscopic debridement performed.  See procedure note for details. Crusting, post-operative debris, and retained secretions were removed as planned and indicated for recent endoscopic sinonasal surgery.     Before debridement:                After debridement:                              Data Reviewed    Pathology report indicated chronic inflammation with rare eosinophils.       Part 1   Fragments of respiratory mucosa and cancellous bone (submitted as left sinus   contents):   -Chronic inflammation with lymphoid aggregate formation and fibrosis within   respiratory mucosa.  Eosinophils focally number greater than 30 per   high-power field.   -No intrinsic histopathologic abnormalities of bone   -Clinical correlation is necessary     Part 2   Fragments of respiratory mucosa and cancellous bone (submitted as right sinus   contents):   -Moderate chronic inflammation with early lymphoid aggregate formation   present within respiratory mucosa.   -No eosinophils are identified   -No intrinsic histopathologic abnormalities of bone      Operative report reviewed and procedures performed as well as key operative findings are described in history above.    Assessment:     Doing well following bilateral endoscopic sinus surgery.  He also underwent septum/turbinate surgery which is unrelated to the reason for today's visit.    1. Chronic pansinusitis    2. Nasal obstruction    3. Disturbance of smell    4. Status post functional endoscopic sinus surgery (FESS)    5. Nasal crusting      Plan:     We discussed post-operative cares  following surgery and ongoing medical management. Importance of scheduled debridements to remove crusting, debris, and potential scar tissue was reviewed again today, as this is associated with optimal healing and overall improved surgical outcome. Importance of medical therapies following surgery were also reviewed including saline irrigations. All questions were answered. He will continue with medical management consisting of nasal saline irrigations and topical nasal steroid spray. We will continue to follow his response to surgery and this ongoing medical therapy. Follow-up is scheduled. I encouraged him to call with any questions or concerns in the meantime.          Clifton Angel MD    Rhinology, Allergy, and Sinus-Skull Base Surgery    Department of Otorhinolaryngology    [facility]    Phone  [phone]    Fax      [phone]

## 2020-02-28 NOTE — PROCEDURES
"Nasal/sinus endoscopy  Date/Time: 2/27/2020 10:00 AM    Time out: Immediately prior to procedure a "time out" was called to verify the correct patient, procedure, equipment, support staff and site/side marked as required.  Performed by: Clifton Angel MD  Authorized by: Clifton Angel MD     Consent Done?:  Yes (Verbal)  Anesthesia:     Local anesthetic:  4% Xylocaine spray with Harjit-Synephrine    Location: Bilateral.    Patient tolerance:  Patient tolerated the procedure well with no immediate complications  Nose:     Nasal Endoscopy Performed: Endoscopic sinonasal debridement.  External:      No external nasal deformity  Intranasal:      Mucosa no polyps     Mucosa ulcers not present     Mucosa lesions present (edema and crusting related to recent surgery)     Turbinates not enlarged     No septum gross deformity  Nasopharynx:      No mucosa lesions     Adenoids not present     Posterior choanae patent     Eustachian tube patent       Procedure Description: The rigid nasal endoscopy was used to assist with debridement of the sinonasal cavities as part of necessary postoperative care. Failure to perform adequate postoperative debridement following endoscopic sinus surgery is known to result in poor healing, scarring and worse surgical outcomes, and thus indicated to promote optimal results from endoscopic sinus surgery. Informed consent was obtained prior to proceeding.     The nasal cavity was decongested and anesthetized.  A rigid nasal endoscope was introduced into the nasal cavity and used to evaluate the sinonasal cavities, mucosa, sinus ostia and turbinates bilaterally. Crusting/necrotic tissue was removed with forceps from the osteomeatal complex bilaterally. Additional debris/necrotic material,blood clots and retained secretions were removed from the nasal cavities and paranasal sinuses bilaterally. The patient tolerated the procedure well and there were no complications/adverse events.     Overall " Assessment: Successful debridement and evaluation with endoscopy. Remainder of steroid eluting stents removed along with crusting, aside from small fragments at the left posterior ethmoid cavity adherent to some crusting over the SPA, which was left to avoid removing this small crust from the area of the vessel and causing bleeding. Recovering well following endoscopic sinus surgery with expected post-operative findings.     Descriptions and representative images of key findings from this endoscopy procedure can be found in the associated clinic note of the same date of service. All uploaded images obtained during this exam may also be found in the media section of the patient's chart.        Clifton Angel MD    Rhinology, Allergy, and Sinus-Skull Base Surgery    Department of Otorhinolaryngology    [facility]    Phone  [phone]    Fax      [phone]

## 2020-03-04 ENCOUNTER — OFFICE VISIT (OUTPATIENT)
Dept: OTOLARYNGOLOGY | Facility: CLINIC | Age: 60
End: 2020-03-04
Payer: COMMERCIAL

## 2020-03-04 VITALS
HEIGHT: 70 IN | SYSTOLIC BLOOD PRESSURE: 90 MMHG | DIASTOLIC BLOOD PRESSURE: 70 MMHG | BODY MASS INDEX: 27.21 KG/M2 | WEIGHT: 190.06 LBS

## 2020-03-04 DIAGNOSIS — Z98.890 STATUS POST FUNCTIONAL ENDOSCOPIC SINUS SURGERY (FESS): ICD-10-CM

## 2020-03-04 DIAGNOSIS — J34.89 NASAL OBSTRUCTION: ICD-10-CM

## 2020-03-04 DIAGNOSIS — R09.81 NASAL CONGESTION: ICD-10-CM

## 2020-03-04 DIAGNOSIS — J32.4 CHRONIC PANSINUSITIS: Primary | ICD-10-CM

## 2020-03-04 DIAGNOSIS — J34.89 NASAL CRUSTING: ICD-10-CM

## 2020-03-04 PROCEDURE — 31237 NSL/SINS NDSC SURG BX POLYPC: CPT | Mod: 50,79,S$GLB, | Performed by: OTOLARYNGOLOGY

## 2020-03-04 PROCEDURE — 99213 OFFICE O/P EST LOW 20 MIN: CPT | Mod: 25,24,S$GLB, | Performed by: OTOLARYNGOLOGY

## 2020-03-04 PROCEDURE — 31237 PR NASAL/SINUS ENDOSCOPY,BX/RMV POLYP/DEBRID: ICD-10-PCS | Mod: 50,79,S$GLB, | Performed by: OTOLARYNGOLOGY

## 2020-03-04 PROCEDURE — 99213 PR OFFICE/OUTPT VISIT, EST, LEVL III, 20-29 MIN: ICD-10-PCS | Mod: 25,24,S$GLB, | Performed by: OTOLARYNGOLOGY

## 2020-03-04 NOTE — PROGRESS NOTES
"  Subjective:      Stanley Faustin is a 59 y.o. male who comes for follow-up 3 weeks status-post endoscopic sinus surgery. His last clinic visit with me was on 2/27/2020.  He denies abnormal discharge, pain, or unusual bleeding at this point.  He has taken post-operative medications as prescribed, adhered to post-operative instructions including activity restrictions, and has performed recommended post-operative cares including nasal saline irrigations. In general feels that he has had much improvement and good results from surgery and post-operative medical therapy. Some congestion on the right more than on the left. Has returned to work with no issues. Recovery has overall followed an expected course for the procedures performed and no other new or unusual associated symptoms are present.     Global QOL assessment ("overall, how do you feel today?" from 1 "very bad" to 10 "very good"): 8    Surgical procedures performed on 2/11/20:  1. Bilateral revision endoscopic maxillary antrostomy with removal of polyps and debris from the maxillary sinuses.  2. Bilateral revision endoscopic total ethmoidectomy.  3. Bilateral endoscopic sphenoidotomy.  4. Bilateral endoscopic frontal sinusotomy.  5. CT stereotactic navigational surgery.  7. Septoplasty.     Findings at time of surgery:  Mr. Faustin had evidence of significant edema throughout the ethmoid cavities Gross intrasinus polyps within the bilateral maxillary sinuses. Purulent material and polyps within bilateral maxillary sinuses. Evidence of prior limited sinus surgery with scar and obstruction of residual sinuses bilaterally. Mucosal edema and inflammation throughout sinonasal cavities bilaterally. Gross polyposis limited to within bilateral maxillary sinuses associated with entrapped infection and purulent debris. Residual uncinate process scarred over maxillary antrostomies. Culture obtained from left maxillary of thick purulent drainage. Directed " septoplasty revision with correction of biphasic deviation and quilt suture reduction of septal swell bodies. All paranasal sinuses widely patent after surgery. Steroid eluting stents placed in bilateral ethmoid cavities extending from frontal recess to sphenoid face. No significant osteitis was present.     Past Medical History  He has a past medical history of Cluster headaches, Coronary artery disease, Diabetes mellitus, Diabetes mellitus type II, Headache, High cholesterol, Hyperlipidemia, Hypertension, and Myocardial infarction.    Past Surgical History  He has a past surgical history that includes Coronary angioplasty with stent; Tonsillectomy; Uvulopalatopharyngoplasty; Reconstruction of nose (Bilateral, 6/26/2018); Turbinate resection (Bilateral, 6/26/2018); Functional endoscopic sinus surgery (FESS) using computer-assisted navigation (Bilateral, 6/26/2018); left shoulder; pr cabg, artery-vein, four (04/12/2019); Endoscopic nasal septoplasty (N/A, 2/11/2020); and Functional endoscopic sinus surgery (FESS) using computer-assisted navigation (Bilateral, 2/11/2020).    Allergies  He has No Known Allergies.    Medications   He has a current medication list which includes the following prescription(s): aspirin, atorvastatin, blood sugar diagnostic, blood-glucose meter, clotrimazole-betamethasone 1-0.05%, diclofenac sodium, ibuprofen, insulin degludec, lancets, lisinopril, metformin, metoprolol tartrate, nateglinide, nicotine polacrilex, nitroglycerin, oxycodone, prednisone, semaglutide, and sildenafil.    Review of Systems  Previous ROS reviewed and updated as per HPI with pertinent positives and negatives including:  Eyes: No change in vision, including no new double vision  Nose: Post-operative changes as per HPI, no other new symptoms present  Hematologic: No unusual bleeding  Respiratory: No respiratory distress, nasal breathing changes as per HPI  Neuro: Post-operative pain as per HPI, no other acute  "neurologic changes    New complete ROS performed today:  Review of Systems   Constitutional: Negative.    HENT: Positive for sinus pressure. Negative for hoarse voice.    Eyes: Negative.    Respiratory: Positive for apnea.         Snoring   Cardiovascular: Positive for chest pain.        Heart Burn, Heart Problems, Hypertension   Gastrointestinal: Negative.    Endocrine: Negative.         Diabetes   Genitourinary: Negative.    Musculoskeletal: Negative.    Skin: Negative.    Allergic/Immunologic: Negative.    Neurological: Negative.    Hematological: Negative.    Psychiatric/Behavioral: Positive for sleep disturbance.       Objective:     BP 90/70 (BP Location: Right arm, Patient Position: Sitting, BP Method: Medium (Manual))   Ht 5' 10" (1.778 m)   Wt 86.2 kg (190 lb 0.6 oz)   BMI 27.27 kg/m²        Constitutional:   He appears well-developed and well-nourished. He is active. He does not appear ill. No distress. Normal speech.  No hoarse voice and breathy voice.      Head:  Normocephalic and atraumatic. No skin lesions. Facial strength is normal.      Ears:    Right Ear: No drainage, swelling or tenderness. No decreased hearing is noted.   Left Ear: No drainage, swelling or tenderness. No decreased hearing is noted.     Nose:  Mucosal edema (mucosal edema consistent with recent sinonasal procedures and appropriate for post-operative course) present. No rhinorrhea, sinus tenderness, septal deviation, nasal septal hematoma or polyps. No epistaxis (no active epistaxis is present, some old blood/crusting related to recent surgery). No turbinate hypertrophy.      Mouth/Throat  Oropharynx clear and moist without lesions or asymmetry and normal uvula midline. No oral lesions. No oropharyngeal exudate, posterior oropharyngeal edema or posterior oropharyngeal erythema.     Neck:  Neck normal without thyromegaly masses, asymmetry, normal tracheal structure, crepitus, and tenderness, phonation normal and full range of " motion with neck supple. No edema, no erythema and no neck rigidity present.     Pulmonary/Chest:   Effort normal.     Psychiatric:   He has a normal mood and affect. His speech is normal and behavior is normal.     Neurological:   He has neurological normal, alert and oriented. No cranial nerve deficit or sensory deficit.     Skin:   No abrasions, lacerations, lesions, or rashes.       Procedure    Endoscopic debridement performed.  See procedure note for details. Crusting, post-operative debris, and retained secretions were removed as planned and indicated for recent endoscopic sinonasal surgery.               Residual stent material and associated crusting was removed.                    Data Reviewed    Pathology report indicated chronic inflammation with rare eosinophils.       Part 1   Fragments of respiratory mucosa and cancellous bone (submitted as left sinus   contents):   -Chronic inflammation with lymphoid aggregate formation and fibrosis within   respiratory mucosa.  Eosinophils focally number greater than 30 per   high-power field.   -No intrinsic histopathologic abnormalities of bone   -Clinical correlation is necessary     Part 2   Fragments of respiratory mucosa and cancellous bone (submitted as right sinus   contents):   -Moderate chronic inflammation with early lymphoid aggregate formation   present within respiratory mucosa.   -No eosinophils are identified   -No intrinsic histopathologic abnormalities of bone      Operative report reviewed and procedures performed as well as key operative findings are described in history above.      Assessment:     Doing well following bilateral endoscopic sinus surgery.  He also underwent septum/turbinate surgery which is unrelated to the reason for today's visit.    1. Chronic pansinusitis    2. Nasal obstruction    3. Nasal crusting    4. Status post functional endoscopic sinus surgery (FESS)    5. Nasal congestion         Plan:     We discussed  post-operative cares following surgery and ongoing medical management. Importance of medical therapies following surgery were reviewed including saline irrigations. All questions were answered. He will continue with medical management consisting of nasal saline irrigations and topical nasal steroid spray. We will continue to follow his response to surgery and this ongoing medical therapy. Follow-up is scheduled in 1 month. I encouraged him to call with any questions or concerns in the meantime.           Clifton Angel MD    Rhinology, Allergy, and Sinus-Skull Base Surgery    Department of Otorhinolaryngology    [facility]    Phone  [phone]    Fax      [phone]

## 2020-03-05 NOTE — PROCEDURES
"Nasal/sinus endoscopy  Date/Time: 3/4/2020 9:30 AM    Time out: Immediately prior to procedure a "time out" was called to verify the correct patient, procedure, equipment, support staff and site/side marked as required.  Performed by: Clifton Angel MD  Authorized by: Clifton Angel MD     Consent Done?:  Yes (Verbal)  Anesthesia:     Local anesthetic:  4% Xylocaine spray with Harjit-Synephrine    Location: Bilateral.    Patient tolerance:  Patient tolerated the procedure well with no immediate complications  Nose:     Nasal Endoscopy Performed: Endoscopic sinonasal debridement.  External:      No external nasal deformity  Intranasal:      Mucosa no polyps     Mucosa ulcers not present     Mucosa lesions present (mild crusting, mucosal edema on right greater than left, changes as expected from recent surgery)     Turbinates not enlarged     No septum gross deformity  Nasopharynx:      No mucosa lesions     Adenoids not present     Posterior choanae patent     Eustachian tube patent     Procedure Description: The rigid nasal endoscopy was used to assist with debridement of the sinonasal cavities as part of necessary postoperative care. Failure to perform adequate postoperative debridement following endoscopic sinus surgery is known to result in poor healing, scarring and worse surgical outcomes, and thus indicated to promote optimal results from endoscopic sinus surgery. Informed consent was obtained prior to proceeding.      The nasal cavity was decongested and anesthetized.  A rigid nasal endoscope was introduced into the nasal cavity and used to evaluate the sinonasal cavities, mucosa, sinus ostia and turbinates bilaterally. Crusting/necrotic tissue was removed with forceps from the osteomeatal complex bilaterally. Remainder of steroid eluting stents removed along with crusting, specifically the fragments at the left posterior ethmoid cavity adherent to some crusting over the SPA which had been left to avoid " removing this small crust from the area of the vessel when more adherent at last evaluation. The patient tolerated the procedure well and there were no complications/adverse events.      Overall Assessment: Successful debridement and evaluation with endoscopy. Recovering well following endoscopic sinus surgery with expected post-operative findings.      Descriptions and representative images of key findings from this endoscopy procedure can be found in the associated clinic note of the same date of service. All uploaded images obtained during this exam may also be found in the media section of the patient's chart.         Clifton Angel MD    Rhinology, Allergy, and Sinus-Skull Base Surgery    Department of Otorhinolaryngology    [facility]    Phone  [phone]    Fax      [phone]

## 2020-03-09 LAB — FUNGUS SPEC CULT: NORMAL

## 2020-03-20 DIAGNOSIS — E11.9 TYPE 2 DIABETES MELLITUS WITHOUT COMPLICATION: ICD-10-CM

## 2020-04-10 DIAGNOSIS — Z95.1 HX OF CABG: ICD-10-CM

## 2020-04-14 RX ORDER — ATORVASTATIN CALCIUM 40 MG/1
TABLET, FILM COATED ORAL
Qty: 90 TABLET | Refills: 2 | Status: SHIPPED | OUTPATIENT
Start: 2020-04-14 | End: 2021-01-22

## 2020-04-14 NOTE — PROGRESS NOTES
Refill Authorization Note     is requesting a refill authorization.    Brief assessment and rationale for refill: APPROVE: PRR               Medication reconciliation completed: No                         Comments:   Refill Center Care Gap Closure protocols temporarily suspended.   Requested Prescriptions   Pending Prescriptions Disp Refills    atorvastatin (LIPITOR) 40 MG tablet [Pharmacy Med Name: ATORVASTATIN 40MG TABLETS] 90 tablet 2     Sig: TAKE 1 TABLET BY MOUTH EVERY DAY       Cardiovascular:  Antilipid - Statins Passed - 4/14/2020 12:36 PM        Passed - Patient is at least 18 years old        Passed - Office visit in past 12 months or future 90 days.     Recent Outpatient Visits            1 month ago Chronic pansinusitis    [facility] Clifton Angel MD    1 month ago Chronic pansinusitis    [facility] Clifton Angel MD    1 month ago Chronic pansinusitis    [facility] Clifton Angel MD    2 months ago Chronic pansinusitis    [facility]    4 months ago Uncontrolled type 2 diabetes mellitus with stage 3 chronic kidney disease, without long-term current use of insulin    [facility] Masood Khan MD          Future Appointments              In 1 month Clifton Angel MD [facility]                Passed - Lipid Panel completed in last 360 days     Lab Results   Component Value Date    CHOL 155 12/02/2019    HDL 50 12/02/2019    LDLCALC 76.4 12/02/2019    TRIG 143 12/02/2019             Passed - ALT is 94 or below and within 360 days     ALT   Date Value Ref Range Status   12/02/2019 41 10 - 44 U/L Final   11/20/2018 42 10 - 44 U/L Final   06/11/2018 39 10 - 44 U/L Final              Passed - AST is 54 or below and within 360 days     AST   Date Value Ref Range Status   12/02/2019 22 10 - 40 U/L Final   11/20/2018 32 10 - 40 U/L Final   06/11/2018 31 10 -  40 U/L Final               Appointments  past 12m or future 3m with PCP    Date Provider   Last Visit   12/2/2019 Masood Khan MD   Next Visit   Visit date not found Masood Khan MD   .  ED visits in past 90 days: 0       Note composed:12:40 PM 04/14/2020

## 2020-04-15 LAB
ACID FAST MOD KINY STN SPEC: NORMAL
MYCOBACTERIUM SPEC QL CULT: NORMAL

## 2020-05-04 ENCOUNTER — TELEPHONE (OUTPATIENT)
Dept: OTOLARYNGOLOGY | Facility: CLINIC | Age: 60
End: 2020-05-04

## 2020-05-04 NOTE — TELEPHONE ENCOUNTER
Called Patient and offered a Virtual Visit for May 14, Patient said that he will call back either June or July due to having another Surgery in June and he said he will have to see Dr. Angel in office.

## 2020-05-12 DIAGNOSIS — Z95.1 HX OF CABG: ICD-10-CM

## 2020-05-14 RX ORDER — NITROGLYCERIN 0.4 MG/1
TABLET SUBLINGUAL
Qty: 25 TABLET | Refills: 2 | Status: SHIPPED | OUTPATIENT
Start: 2020-05-14

## 2020-05-14 NOTE — PROGRESS NOTES
Refill Routing Note    Medication(s) are not appropriate for processing by [facility]:       Drug-Drug Interaction (sildenafiL and nitroGLYCERIN)     Medication-related problems identified: Drug-drug interaction  Medication Therapy Plan: Drug-Drug: sildenafiL and nitroGLYCERIN -The concurrent use of CGMP specific PDE type-5 inhibitors and nitrates potentiates the hypotensive effects of nitrates(1-7) which may result in dizziness, syncope, heart attack, or stroke.(4) The concurrent use of sildenafil and sodium nitroprusside may potentiate the antiaggregatory effect of sodium nitroprusside in addition to increased hypotensive effects.(  Medication reconciliation completed: No      Automatic Epic Protocol Generated Data:    Requested Prescriptions   Pending Prescriptions Disp Refills    nitroGLYCERIN (NITROSTAT) 0.4 MG SL tablet [Pharmacy Med Name: NITROGLYCERIN 0.4MG SUB TAB 25] 25 tablet 2     Sig: PLACE ONE TABLET UNDER THE TONGUE EVERY 5 MINUTES AS NEEDED       Cardiovascular: Nitrates - nitroglycerin Passed - 5/12/2020  6:59 AM        Passed - Patient is at least 18 years old        Passed - Last BP in normal range within 360 days.     BP Readings from Last 3 Encounters:   03/04/20 90/70   02/27/20 110/60   02/20/20 100/70              Passed - Last Heart Rate in normal range within 360 days.     Pulse Readings from Last 3 Encounters:   02/11/20 92   02/07/20 92   12/02/19 89             Passed - Office visit in past 12 months or future 90 days.     Recent Outpatient Visits            2 months ago Chronic pansinusitis    [facility] Clifton Angel MD    2 months ago Chronic pansinusitis    [facility] Clifton Angel MD    2 months ago Chronic pansinusitis    [facility] Clifton Angel MD    3 months ago Chronic pansinusitis    [facility] Clifton Angel MD    5 months ago Uncontrolled type 2 diabetes mellitus with stage 3 chronic  kidney disease, without long-term current use of insulin    [facility] Masood Khan MD                       Appointments  past 12m or future 3m with PCP    Date Provider   Last Visit   12/2/2019 Masood Khan MD   Next Visit   Visit date not found Masood Khan MD   ED visits in past 90 days: 0     Note composed:7:29 PM 05/13/2020

## 2020-05-15 ENCOUNTER — PATIENT MESSAGE (OUTPATIENT)
Dept: FAMILY MEDICINE | Facility: CLINIC | Age: 60
End: 2020-05-15

## 2020-05-18 ENCOUNTER — PATIENT MESSAGE (OUTPATIENT)
Dept: FAMILY MEDICINE | Facility: CLINIC | Age: 60
End: 2020-05-18

## 2020-05-18 RX ORDER — DICLOFENAC SODIUM 10 MG/G
GEL TOPICAL
Qty: 100 G | Refills: 1 | OUTPATIENT
Start: 2020-05-18

## 2020-05-19 ENCOUNTER — PATIENT MESSAGE (OUTPATIENT)
Dept: FAMILY MEDICINE | Facility: CLINIC | Age: 60
End: 2020-05-19

## 2020-05-19 RX ORDER — METOPROLOL TARTRATE 25 MG/1
12.5 TABLET, FILM COATED ORAL 2 TIMES DAILY
Qty: 180 TABLET | Refills: 3 | Status: SHIPPED | OUTPATIENT
Start: 2020-05-19 | End: 2021-08-16

## 2020-05-19 NOTE — TELEPHONE ENCOUNTER
No new care gaps identified.  Powered by [employer]. Reference number: [id number]. 5/19/2020 10:59:14 AM   WILLEMT

## 2020-05-19 NOTE — TELEPHONE ENCOUNTER
Care Due:                  Date            Visit Type   Department     Provider  --------------------------------------------------------------------------------                                [facility]                              FOLLOWUP/OF  MED/ INTERNAL  Last Visit: 12-      FICE VISIT   MED/ PEDS      DOTTIE DUKES  Next Visit: None Scheduled  None         None Found                                                            Last  Test          Frequency    Reason                     Performed    Due Date  --------------------------------------------------------------------------------    Office Visit  3 months...  oxyCODONE................  12- 03-    eGFR........  12 months..  insulin, lisinopril,       Not Found    Overdue                             metFORMIN................    HBA1C.......  6 months...  insulin, metFORMIN,        12- 05-                             semaglutide..............    Powered by [employer]. Reference number: [id number]. 5/19/2020 10:40:36 AM   CDT

## 2020-06-12 ENCOUNTER — PATIENT MESSAGE (OUTPATIENT)
Dept: FAMILY MEDICINE | Facility: CLINIC | Age: 60
End: 2020-06-12

## 2020-06-15 ENCOUNTER — TELEPHONE (OUTPATIENT)
Dept: FAMILY MEDICINE | Facility: CLINIC | Age: 60
End: 2020-06-15

## 2020-06-15 NOTE — TELEPHONE ENCOUNTER
----- Message from Orville Dwyer sent at 6/15/2020 10:46 AM CDT -----  Regarding: rishabh / david / [phone]   Name of Who is Calling:     What is the request in detail:  request call back in reference to  medication directions //semaglutide (OZEMPIC) 1 mg/dose (2 mg/1.5 mL)    Please contact to further discuss and advise      Can the clinic reply by [facility]: no     What Number to Call Back if not in [facility]:  rishabh / david / [phone]

## 2020-06-16 ENCOUNTER — TELEPHONE (OUTPATIENT)
Dept: FAMILY MEDICINE | Facility: CLINIC | Age: 60
End: 2020-06-16

## 2020-06-16 RX ORDER — SEMAGLUTIDE 1.34 MG/ML
1 INJECTION, SOLUTION SUBCUTANEOUS
Qty: 12 SYRINGE | Refills: 0 | Status: SHIPPED | OUTPATIENT
Start: 2020-06-16 | End: 2020-06-16 | Stop reason: SDUPTHER

## 2020-06-16 RX ORDER — SEMAGLUTIDE 1.34 MG/ML
INJECTION, SOLUTION SUBCUTANEOUS
Qty: 12 SYRINGE | Refills: 0 | Status: SHIPPED | OUTPATIENT
Start: 2020-06-16 | End: 2021-01-05

## 2020-06-19 ENCOUNTER — TELEPHONE (OUTPATIENT)
Dept: OTOLARYNGOLOGY | Facility: CLINIC | Age: 60
End: 2020-06-19

## 2020-06-19 DIAGNOSIS — Z98.890 STATUS POST FUNCTIONAL ENDOSCOPIC SINUS SURGERY (FESS): Primary | ICD-10-CM

## 2020-06-19 NOTE — TELEPHONE ENCOUNTER
Patient requested a follow up appointment with you. Had surgery in Feb 2020, scheduled patient to be seen June 24th but patient canceled the appointment due to not wanting to be Coivd tested.

## 2020-07-23 ENCOUNTER — PATIENT MESSAGE (OUTPATIENT)
Dept: FAMILY MEDICINE | Facility: CLINIC | Age: 60
End: 2020-07-23

## 2020-07-23 RX ORDER — PEN NEEDLE, DIABETIC 30 GX3/16"
1 NEEDLE, DISPOSABLE MISCELLANEOUS 3 TIMES DAILY
Qty: 200 EACH | Refills: 5 | Status: SHIPPED | OUTPATIENT
Start: 2020-07-23 | End: 2021-09-16 | Stop reason: SDUPTHER

## 2020-09-02 ENCOUNTER — LAB VISIT (OUTPATIENT)
Dept: LAB | Facility: HOSPITAL | Age: 60
End: 2020-09-02
Attending: FAMILY MEDICINE
Payer: COMMERCIAL

## 2020-09-02 DIAGNOSIS — Z01.818 PRE-OP EVALUATION: ICD-10-CM

## 2020-09-02 LAB
ALBUMIN SERPL BCP-MCNC: 4.3 G/DL (ref 3.5–5.2)
ALP SERPL-CCNC: 77 U/L (ref 55–135)
ALT SERPL W/O P-5'-P-CCNC: 38 U/L (ref 10–44)
ANION GAP SERPL CALC-SCNC: 12 MMOL/L (ref 8–16)
AST SERPL-CCNC: 23 U/L (ref 10–40)
BASOPHILS # BLD AUTO: 0.04 K/UL (ref 0–0.2)
BASOPHILS NFR BLD: 0.5 % (ref 0–1.9)
BILIRUB SERPL-MCNC: 1.6 MG/DL (ref 0.1–1)
BUN SERPL-MCNC: 22 MG/DL (ref 6–20)
CALCIUM SERPL-MCNC: 9.8 MG/DL (ref 8.7–10.5)
CHLORIDE SERPL-SCNC: 104 MMOL/L (ref 95–110)
CHOLEST SERPL-MCNC: 141 MG/DL (ref 120–199)
CHOLEST/HDLC SERPL: 3.4 {RATIO} (ref 2–5)
CO2 SERPL-SCNC: 24 MMOL/L (ref 23–29)
COMPLEXED PSA SERPL-MCNC: 0.35 NG/ML (ref 0–4)
CREAT SERPL-MCNC: 1 MG/DL (ref 0.5–1.4)
DIFFERENTIAL METHOD: NORMAL
EOSINOPHIL # BLD AUTO: 0.2 K/UL (ref 0–0.5)
EOSINOPHIL NFR BLD: 2.3 % (ref 0–8)
ERYTHROCYTE [DISTWIDTH] IN BLOOD BY AUTOMATED COUNT: 13.2 % (ref 11.5–14.5)
EST. GFR  (AFRICAN AMERICAN): >60 ML/MIN/1.73 M^2
EST. GFR  (NON AFRICAN AMERICAN): >60 ML/MIN/1.73 M^2
GLUCOSE SERPL-MCNC: 140 MG/DL (ref 70–110)
HCT VFR BLD AUTO: 50.1 % (ref 40–54)
HDLC SERPL-MCNC: 41 MG/DL (ref 40–75)
HDLC SERPL: 29.1 % (ref 20–50)
HGB BLD-MCNC: 16.2 G/DL (ref 14–18)
IMM GRANULOCYTES # BLD AUTO: 0.02 K/UL (ref 0–0.04)
IMM GRANULOCYTES NFR BLD AUTO: 0.3 % (ref 0–0.5)
INR PPP: 1 (ref 0.8–1.2)
LDLC SERPL CALC-MCNC: 68.6 MG/DL (ref 63–159)
LYMPHOCYTES # BLD AUTO: 2.9 K/UL (ref 1–4.8)
LYMPHOCYTES NFR BLD: 37.6 % (ref 18–48)
MCH RBC QN AUTO: 29.3 PG (ref 27–31)
MCHC RBC AUTO-ENTMCNC: 32.3 G/DL (ref 32–36)
MCV RBC AUTO: 91 FL (ref 82–98)
MONOCYTES # BLD AUTO: 0.6 K/UL (ref 0.3–1)
MONOCYTES NFR BLD: 7.5 % (ref 4–15)
NEUTROPHILS # BLD AUTO: 4 K/UL (ref 1.8–7.7)
NEUTROPHILS NFR BLD: 51.8 % (ref 38–73)
NONHDLC SERPL-MCNC: 100 MG/DL
NRBC BLD-RTO: 0 /100 WBC
PLATELET # BLD AUTO: 210 K/UL (ref 150–350)
PMV BLD AUTO: 9.9 FL (ref 9.2–12.9)
POTASSIUM SERPL-SCNC: 4.7 MMOL/L (ref 3.5–5.1)
PROT SERPL-MCNC: 7.8 G/DL (ref 6–8.4)
PROTHROMBIN TIME: 10.6 SEC (ref 9–12.5)
RBC # BLD AUTO: 5.53 M/UL (ref 4.6–6.2)
SODIUM SERPL-SCNC: 140 MMOL/L (ref 136–145)
T4 FREE SERPL-MCNC: 0.92 NG/DL (ref 0.71–1.51)
TRIGL SERPL-MCNC: 157 MG/DL (ref 30–150)
TSH SERPL DL<=0.005 MIU/L-ACNC: 1.22 UIU/ML (ref 0.4–4)
WBC # BLD AUTO: 7.73 K/UL (ref 3.9–12.7)

## 2020-09-02 PROCEDURE — 85025 COMPLETE CBC W/AUTO DIFF WBC: CPT

## 2020-09-02 PROCEDURE — 83036 HEMOGLOBIN GLYCOSYLATED A1C: CPT

## 2020-09-02 PROCEDURE — 80053 COMPREHEN METABOLIC PANEL: CPT

## 2020-09-02 PROCEDURE — 80061 LIPID PANEL: CPT

## 2020-09-02 PROCEDURE — 36415 COLL VENOUS BLD VENIPUNCTURE: CPT | Mod: PO

## 2020-09-02 PROCEDURE — 85610 PROTHROMBIN TIME: CPT

## 2020-09-02 PROCEDURE — 84439 ASSAY OF FREE THYROXINE: CPT

## 2020-09-02 PROCEDURE — 84443 ASSAY THYROID STIM HORMONE: CPT

## 2020-09-02 PROCEDURE — 84153 ASSAY OF PSA TOTAL: CPT

## 2020-09-03 LAB
ESTIMATED AVG GLUCOSE: 194 MG/DL (ref 68–131)
HBA1C MFR BLD HPLC: 8.4 % (ref 4–5.6)

## 2020-09-04 ENCOUNTER — PATIENT MESSAGE (OUTPATIENT)
Dept: FAMILY MEDICINE | Facility: CLINIC | Age: 60
End: 2020-09-04

## 2020-09-04 DIAGNOSIS — E11.3299 TYPE 2 DIABETES MELLITUS WITH MILD NONPROLIFERATIVE RETINOPATHY, WITH LONG-TERM CURRENT USE OF INSULIN, MACULAR EDEMA PRESENCE UNSPECIFIED, UNSPECIFIED LATERALITY: Primary | Chronic | ICD-10-CM

## 2020-09-04 DIAGNOSIS — Z79.4 TYPE 2 DIABETES MELLITUS WITH MILD NONPROLIFERATIVE RETINOPATHY, WITH LONG-TERM CURRENT USE OF INSULIN, MACULAR EDEMA PRESENCE UNSPECIFIED, UNSPECIFIED LATERALITY: Primary | Chronic | ICD-10-CM

## 2020-09-08 RX ORDER — NATEGLINIDE 120 MG/1
TABLET ORAL
Qty: 90 TABLET | Refills: 0 | Status: SHIPPED | OUTPATIENT
Start: 2020-09-08 | End: 2020-09-09 | Stop reason: SDUPTHER

## 2020-09-09 ENCOUNTER — PATIENT MESSAGE (OUTPATIENT)
Dept: FAMILY MEDICINE | Facility: CLINIC | Age: 60
End: 2020-09-09

## 2020-09-09 DIAGNOSIS — E11.3299 TYPE 2 DIABETES MELLITUS WITH MILD NONPROLIFERATIVE RETINOPATHY, WITH LONG-TERM CURRENT USE OF INSULIN, MACULAR EDEMA PRESENCE UNSPECIFIED, UNSPECIFIED LATERALITY: Chronic | ICD-10-CM

## 2020-09-09 DIAGNOSIS — Z79.4 TYPE 2 DIABETES MELLITUS WITH MILD NONPROLIFERATIVE RETINOPATHY, WITH LONG-TERM CURRENT USE OF INSULIN, MACULAR EDEMA PRESENCE UNSPECIFIED, UNSPECIFIED LATERALITY: Chronic | ICD-10-CM

## 2020-09-09 RX ORDER — NATEGLINIDE 120 MG/1
TABLET ORAL
Qty: 90 TABLET | Refills: 2 | Status: SHIPPED | OUTPATIENT
Start: 2020-09-09 | End: 2021-02-12 | Stop reason: SDUPTHER

## 2020-09-09 NOTE — TELEPHONE ENCOUNTER
No new care gaps identified.  Powered by [employer]. Reference number: [id number]. 9/09/2020 1:54:25 PM CDT

## 2020-09-10 ENCOUNTER — PATIENT OUTREACH (OUTPATIENT)
Dept: ADMINISTRATIVE | Facility: HOSPITAL | Age: 60
End: 2020-09-10

## 2020-09-16 ENCOUNTER — OFFICE VISIT (OUTPATIENT)
Dept: FAMILY MEDICINE | Facility: CLINIC | Age: 60
End: 2020-09-16
Payer: COMMERCIAL

## 2020-09-16 VITALS
WEIGHT: 194.75 LBS | BODY MASS INDEX: 27.88 KG/M2 | TEMPERATURE: 98 F | OXYGEN SATURATION: 97 % | HEART RATE: 98 BPM | HEIGHT: 70 IN | SYSTOLIC BLOOD PRESSURE: 100 MMHG | DIASTOLIC BLOOD PRESSURE: 62 MMHG

## 2020-09-16 DIAGNOSIS — Z79.4 TYPE 2 DIABETES MELLITUS WITH MILD NONPROLIFERATIVE RETINOPATHY, WITH LONG-TERM CURRENT USE OF INSULIN, MACULAR EDEMA PRESENCE UNSPECIFIED, UNSPECIFIED LATERALITY: Chronic | ICD-10-CM

## 2020-09-16 DIAGNOSIS — E11.3299 TYPE 2 DIABETES MELLITUS WITH MILD NONPROLIFERATIVE RETINOPATHY, WITH LONG-TERM CURRENT USE OF INSULIN, MACULAR EDEMA PRESENCE UNSPECIFIED, UNSPECIFIED LATERALITY: Chronic | ICD-10-CM

## 2020-09-16 DIAGNOSIS — G47.00 INSOMNIA, UNSPECIFIED TYPE: ICD-10-CM

## 2020-09-16 DIAGNOSIS — E78.00 HYPERCHOLESTEROLEMIA: Chronic | ICD-10-CM

## 2020-09-16 DIAGNOSIS — I10 ESSENTIAL HYPERTENSION, BENIGN: Chronic | ICD-10-CM

## 2020-09-16 DIAGNOSIS — Z23 NEED FOR IMMUNIZATION AGAINST INFLUENZA: ICD-10-CM

## 2020-09-16 DIAGNOSIS — Z00.00 ROUTINE PHYSICAL EXAMINATION: Primary | ICD-10-CM

## 2020-09-16 PROBLEM — J32.9: Status: RESOLVED | Noted: 2020-02-11 | Resolved: 2020-09-16

## 2020-09-16 PROBLEM — J34.89 NASAL OBSTRUCTION: Status: RESOLVED | Noted: 2020-02-11 | Resolved: 2020-09-16

## 2020-09-16 PROBLEM — J34.89 NOSE OBSTRUCTION: Status: RESOLVED | Noted: 2018-06-26 | Resolved: 2020-09-16

## 2020-09-16 PROBLEM — J32.4 CHRONIC PANSINUSITIS: Status: RESOLVED | Noted: 2020-02-11 | Resolved: 2020-09-16

## 2020-09-16 PROBLEM — Z87.891 HISTORY OF TOBACCO ABUSE: Chronic | Status: ACTIVE | Noted: 2019-04-22

## 2020-09-16 PROCEDURE — 90471 FLU VACCINE (QUAD) GREATER THAN OR EQUAL TO 3YO PRESERVATIVE FREE IM: ICD-10-PCS | Mod: S$GLB,,, | Performed by: FAMILY MEDICINE

## 2020-09-16 PROCEDURE — 90686 IIV4 VACC NO PRSV 0.5 ML IM: CPT | Mod: S$GLB,,, | Performed by: FAMILY MEDICINE

## 2020-09-16 PROCEDURE — 99396 PR PREVENTIVE VISIT,EST,40-64: ICD-10-PCS | Mod: 25,S$GLB,, | Performed by: FAMILY MEDICINE

## 2020-09-16 PROCEDURE — 99396 PREV VISIT EST AGE 40-64: CPT | Mod: 25,S$GLB,, | Performed by: FAMILY MEDICINE

## 2020-09-16 PROCEDURE — 90471 IMMUNIZATION ADMIN: CPT | Mod: S$GLB,,, | Performed by: FAMILY MEDICINE

## 2020-09-16 PROCEDURE — 99999 PR PBB SHADOW E&M-EST. PATIENT-LVL IV: CPT | Mod: PBBFAC,,, | Performed by: FAMILY MEDICINE

## 2020-09-16 PROCEDURE — 90686 FLU VACCINE (QUAD) GREATER THAN OR EQUAL TO 3YO PRESERVATIVE FREE IM: ICD-10-PCS | Mod: S$GLB,,, | Performed by: FAMILY MEDICINE

## 2020-09-16 PROCEDURE — 99999 PR PBB SHADOW E&M-EST. PATIENT-LVL IV: ICD-10-PCS | Mod: PBBFAC,,, | Performed by: FAMILY MEDICINE

## 2020-09-16 RX ORDER — INSULIN DEGLUDEC 100 U/ML
INJECTION, SOLUTION SUBCUTANEOUS
COMMUNITY
Start: 2020-08-04 | End: 2020-09-16 | Stop reason: SDUPTHER

## 2020-09-16 RX ORDER — INSULIN DEGLUDEC 100 U/ML
16 INJECTION, SOLUTION SUBCUTANEOUS NIGHTLY
Qty: 15 ML | Refills: 3
Start: 2020-09-16 | End: 2020-10-23 | Stop reason: SDUPTHER

## 2020-09-16 RX ORDER — OXYCODONE AND ACETAMINOPHEN 7.5; 325 MG/1; MG/1
TABLET ORAL
COMMUNITY
Start: 2020-08-20 | End: 2021-11-30

## 2020-09-16 RX ORDER — EMPAGLIFLOZIN 25 MG/1
TABLET, FILM COATED ORAL
COMMUNITY
Start: 2020-06-12 | End: 2020-12-22

## 2020-09-16 RX ORDER — TRAZODONE HYDROCHLORIDE 50 MG/1
50 TABLET ORAL NIGHTLY PRN
Qty: 30 TABLET | Refills: 11 | Status: SHIPPED | OUTPATIENT
Start: 2020-09-16 | End: 2021-11-30

## 2020-09-16 NOTE — PROGRESS NOTES
[facility]  Progress Note    SUBJECTIVE:     Chief Complaint   Patient presents with    Annual Exam       HPI   Stanley Faustin  is a 60 y.o. male here for routine physical exam. Patient has no other new complaints/problems at this time.      Review of patient's allergies indicates:  No Known Allergies    Past Medical History:   Diagnosis Date    Cluster headaches     Coronary artery disease     s/p stent    Diabetes mellitus     Diabetes mellitus type II     Headache     High cholesterol     Hyperlipidemia     Hypertension     Myocardial infarction      Past Surgical History:   Procedure Laterality Date    CORONARY ANGIOPLASTY WITH STENT PLACEMENT      ENDOSCOPIC NASAL SEPTOPLASTY N/A 2/11/2020    Procedure: SEPTOPLASTY, NOSE, ENDOSCOPIC;  Surgeon: Clifton Angel MD;  Location: [facility] OR;  Service: ENT;  Laterality: N/A;  DISC LOADED BY VICENTE ON 2-5-2020  RN PRE OP 2-7-2020 [facility]  NEED H/P    FUNCTIONAL ENDOSCOPIC SINUS SURGERY (FESS) USING COMPUTER-ASSISTED NAVIGATION Bilateral 6/26/2018    Procedure: SINUS SURGERY FUNCTIONAL ENDOSCOPIC WITH NAVIGATION;  Surgeon: Sina Cortez MD;  Location: [facility] OR;  Service: ENT;  Laterality: Bilateral;    FUNCTIONAL ENDOSCOPIC SINUS SURGERY (FESS) USING COMPUTER-ASSISTED NAVIGATION Bilateral 2/11/2020    Procedure: FESS, USING COMPUTER-ASSISTED NAVIGATION;  Surgeon: Clifton Angel MD;  Location: [facility] OR;  Service: ENT;  Laterality: Bilateral;    left shoulder      IA CABG, ARTERY-VEIN, FOUR  04/12/2019    Coronary Artery Bypass, 4    RECONSTRUCTION OF NOSE Bilateral 6/26/2018    Procedure: RECONSTRUCTION-NASAL;  Surgeon: Sina Cortez MD;  Location: [facility] OR;  Service: ENT;  Laterality: Bilateral;  [employer]  RN PREOP 6/20/2018    TONSILLECTOMY      TURBINATE RESECTION Bilateral 6/26/2018    Procedure: TURBINATE CAUTERY RESECTION WITH DEBRIDER (CRISTINA. MAXILLARY & CRISTINA. ETHMOID);  Surgeon: Sina Cortez MD;  Location: [facility] OR;  Service: ENT;   Laterality: Bilateral;    UVULOPALATOPHARYNGOPLASTY      for REJI     Family History   Problem Relation Age of Onset    Diabetes Mother     Diabetes Father     Heart disease Father     Mental illness Brother         suicide    Cancer Neg Hx         prostate or colon     Social History     Tobacco Use    Smoking status: Former Smoker     Packs/day: 0.75     Types: Cigarettes     Quit date: 2019     Years since quittin.4    Smokeless tobacco: Never Used    Tobacco comment:  x 34 yr.  Works at [employer].  Three kids.  Walks a lot at work.     Substance Use Topics    Alcohol use: Not Currently     Comment: only on occasion    Drug use: Never        Review of Systems   Constitutional: Negative for chills, diaphoresis and fever.   HENT: Negative for congestion, ear pain and sore throat.    Eyes: Negative for photophobia and discharge.   Respiratory: Negative for cough, shortness of breath and wheezing.    Cardiovascular: Negative for chest pain and palpitations.   Gastrointestinal: Negative for abdominal pain, constipation, diarrhea, nausea and vomiting.   Genitourinary: Negative for dysuria and hematuria.   Musculoskeletal: Negative for back pain and myalgias.   Skin: Negative for itching and rash.   Neurological: Negative for dizziness, sensory change, focal weakness, weakness and headaches.   Psychiatric/Behavioral: The patient has insomnia.    All other systems reviewed and are negative.    OBJECTIVE:     Vitals:    20 1429   BP: 100/62   Pulse: 98   Temp: 98.2 °F (36.8 °C)     Body mass index is 27.95 kg/m².    Physical Exam   Constitutional: He is oriented to person, place, and time and well-developed, well-nourished, and in no distress. No distress.   HENT:   Head: Normocephalic and atraumatic.   Right Ear: Tympanic membrane is not perforated, not erythematous and not bulging. No hemotympanum.   Left Ear: Tympanic membrane is not perforated, not erythematous and not  bulging. No hemotympanum.   Mouth/Throat: Oropharynx is clear and moist. No oropharyngeal exudate.   Eyes: Pupils are equal, round, and reactive to light. Conjunctivae and EOM are normal.   Neck: No thyromegaly present.   Cardiovascular: Normal rate, regular rhythm and normal heart sounds. Exam reveals no gallop and no friction rub.   No murmur heard.  Pulmonary/Chest: Effort normal and breath sounds normal. No respiratory distress. He has no wheezes. He has no rales.   Abdominal: Soft. Bowel sounds are normal. He exhibits no distension. There is no abdominal tenderness. There is no rebound and no guarding.   Musculoskeletal: Normal range of motion.         General: No tenderness or edema.   Lymphadenopathy:     He has no cervical adenopathy.   Neurological: He is alert and oriented to person, place, and time.   Skin: Skin is warm. No rash noted. He is not diaphoretic. No erythema.       Old records were reviewed. Labs and/or images were independently reviewed.    ASSESSMENT     1. Routine physical examination    2. Essential hypertension, benign    3. Hypercholesterolemia    4. Type 2 diabetes mellitus with mild nonproliferative retinopathy, with long-term current use of insulin, macular edema presence unspecified, unspecified laterality    5. Need for immunization against influenza    6. Insomnia, unspecified type        PLAN:     Routine physical examination   -     We briefly discussed diet, exercise, and routine preventive exams. All questions and comments addressed.    Essential hypertension, benign   -     Stable. Continue current regimen.    Hypercholesterolemia   -     Counseled patient about healthy diet, exercise habits, and to increase physical activity.    Type 2 diabetes mellitus with mild nonproliferative retinopathy, with long-term current use of insulin, macular edema presence unspecified, unspecified laterality  -     Increase TRESIBA FLEXTOUCH U-100 100 unit/mL (3 mL) InPn; Inject 16 Units into  the skin every evening.  Dispense: 15 mL; Refill: 3  -     Comprehensive metabolic panel; Future  -     Hemoglobin A1C; Future  -     Instructed patient to take daily glucose AM logs and to write them down to bring with on next visit. Advised patient to decrease intake of carbohydrates/simple sugars.         Need for immunization against influenza  -     Influenza - Quadrivalent (PF)    Insomnia, unspecified type  -     Start traZODone (DESYREL) 50 MG tablet; Take 1 tablet (50 mg total) by mouth nightly as needed for Insomnia.  Dispense: 30 tablet; Refill: 11  -     Educated patient about good sleep hygiene habits. No cell phone or TV around bedtime and turn off all lights in room when going to sleep. Take medications as prescribed.    RTC PRJOSE E Khan MD  09/16/2020 2:45 PM

## 2020-10-05 ENCOUNTER — PATIENT MESSAGE (OUTPATIENT)
Dept: ADMINISTRATIVE | Facility: HOSPITAL | Age: 60
End: 2020-10-05

## 2020-10-14 LAB
LEFT EYE DM RETINOPATHY: NEGATIVE
RIGHT EYE DM RETINOPATHY: NEGATIVE

## 2020-10-15 ENCOUNTER — PATIENT OUTREACH (OUTPATIENT)
Dept: ADMINISTRATIVE | Facility: HOSPITAL | Age: 60
End: 2020-10-15

## 2020-10-23 DIAGNOSIS — Z79.4 TYPE 2 DIABETES MELLITUS WITH MILD NONPROLIFERATIVE RETINOPATHY, WITH LONG-TERM CURRENT USE OF INSULIN, MACULAR EDEMA PRESENCE UNSPECIFIED, UNSPECIFIED LATERALITY: Chronic | ICD-10-CM

## 2020-10-23 DIAGNOSIS — E11.3299 TYPE 2 DIABETES MELLITUS WITH MILD NONPROLIFERATIVE RETINOPATHY, WITH LONG-TERM CURRENT USE OF INSULIN, MACULAR EDEMA PRESENCE UNSPECIFIED, UNSPECIFIED LATERALITY: Chronic | ICD-10-CM

## 2020-10-23 RX ORDER — INSULIN DEGLUDEC 100 U/ML
16 INJECTION, SOLUTION SUBCUTANEOUS NIGHTLY
Qty: 15 ML | Refills: 3
Start: 2020-10-23 | End: 2020-10-26 | Stop reason: SDUPTHER

## 2020-10-26 ENCOUNTER — PATIENT MESSAGE (OUTPATIENT)
Dept: FAMILY MEDICINE | Facility: CLINIC | Age: 60
End: 2020-10-26

## 2020-10-26 DIAGNOSIS — Z79.4 TYPE 2 DIABETES MELLITUS WITH MILD NONPROLIFERATIVE RETINOPATHY, WITH LONG-TERM CURRENT USE OF INSULIN, MACULAR EDEMA PRESENCE UNSPECIFIED, UNSPECIFIED LATERALITY: Chronic | ICD-10-CM

## 2020-10-26 DIAGNOSIS — E11.3299 TYPE 2 DIABETES MELLITUS WITH MILD NONPROLIFERATIVE RETINOPATHY, WITH LONG-TERM CURRENT USE OF INSULIN, MACULAR EDEMA PRESENCE UNSPECIFIED, UNSPECIFIED LATERALITY: Chronic | ICD-10-CM

## 2020-10-26 RX ORDER — INSULIN DEGLUDEC 100 U/ML
16 INJECTION, SOLUTION SUBCUTANEOUS NIGHTLY
Qty: 12 ML | Refills: 3 | Status: SHIPPED | OUTPATIENT
Start: 2020-10-26 | End: 2020-11-25 | Stop reason: SDUPTHER

## 2020-11-02 ENCOUNTER — PATIENT MESSAGE (OUTPATIENT)
Dept: FAMILY MEDICINE | Facility: CLINIC | Age: 60
End: 2020-11-02

## 2020-11-04 ENCOUNTER — PATIENT MESSAGE (OUTPATIENT)
Dept: FAMILY MEDICINE | Facility: CLINIC | Age: 60
End: 2020-11-04

## 2020-11-10 RX ORDER — METFORMIN HYDROCHLORIDE 1000 MG/1
TABLET ORAL
Qty: 180 TABLET | Refills: 0 | Status: SHIPPED | OUTPATIENT
Start: 2020-11-10 | End: 2021-02-18 | Stop reason: SDUPTHER

## 2020-11-10 NOTE — TELEPHONE ENCOUNTER
No new care gaps identified.  Powered by [employer]. Reference number: [id number]. 11/10/2020 8:31:55 AM   CST

## 2020-11-10 NOTE — PROGRESS NOTES
Refill Authorization Note   Stanley Faustin is requesting a refill authorization.  Brief assessment and rationale for refill: Approve     Medication Therapy Plan: TYRELL. approve     Medication reconciliation completed: No   Comments:       Requested Prescriptions   Pending Prescriptions Disp Refills    metFORMIN (GLUCOPHAGE) 1000 MG tablet [Pharmacy Med Name: METFORMIN 1000MG TABLETS] 180 tablet 00     Sig: TAKE 1 TABLET BY MOUTH TWICE DAILY WITH MEALS       Endocrinology:  Diabetes - Biguanides Failed - 11/10/2020  5:39 PM        Failed - HBA1C is 8 or below and within 180 days     Hemoglobin A1C   Date Value Ref Range Status   09/02/2020 8.4 (H) 4.0 - 5.6 % Final     Comment:     ADA Screening Guidelines:  5.7-6.4%  Consistent with prediabetes  >or=6.5%  Consistent with diabetes  High levels of fetal hemoglobin interfere with the HbA1C  assay. Heterozygous hemoglobin variants (HbS, HgC, etc)do  not significantly interfere with this assay.   However, presence of multiple variants may affect accuracy.     12/02/2019 8.9 (H) 4.0 - 5.6 % Final     Comment:     ADA Screening Guidelines:  5.7-6.4%  Consistent with prediabetes  >or=6.5%  Consistent with diabetes  High levels of fetal hemoglobin interfere with the HbA1C  assay. Heterozygous hemoglobin variants (HbS, HgC, etc)do  not significantly interfere with this assay.   However, presence of multiple variants may affect accuracy.     04/07/2019 8.8 (H) % Final   11/20/2018 9.1 (H) 4.0 - 5.6 % Final     Comment:     ADA Screening Guidelines:  5.7-6.4%  Consistent with prediabetes  >or=6.5%  Consistent with diabetes  High levels of fetal hemoglobin interfere with the HbA1C  assay. Heterozygous hemoglobin variants (HbS, HgC, etc)do  not significantly interfere with this assay.   However, presence of multiple variants may affect accuracy.                Passed - Patient is at least 18 years old        Passed - Office visit in past 12 months or future 90 days     Recent  Outpatient Visits            1 month ago Routine physical examination    [facility] Masood Khan MD    8 months ago Chronic pansinusitis    [facility] Clifton Angel MD    8 months ago Chronic pansinusitis    [facility] Clifton Angel MD    8 months ago Chronic pansinusitis    [facility]    9 months ago Chronic pansinusitis    [facility]                    Passed - Cr is 1.4 or below and within 360 days     Creatinine   Date Value Ref Range Status   09/02/2020 1.0 0.5 - 1.4 mg/dL Final   02/11/2020 1.1 0.5 - 1.4 mg/dL Final   02/07/2020 1.0 0.5 - 1.4 mg/dL Final              Passed - eGFR is 30 or above and within 360 days     eGFR if non    Date Value Ref Range Status   09/02/2020 >60.0 >60 mL/min/1.73 m^2 Final     Comment:     Calculation used to obtain the estimated glomerular filtration  rate (eGFR) is the CKD-EPI equation.      02/11/2020 >60 >60 mL/min/1.73 m^2 Final     Comment:     Calculation used to obtain the estimated glomerular filtration  rate (eGFR) is the CKD-EPI equation.      02/07/2020 >60 >60 mL/min/1.73 m^2 Final     Comment:     Calculation used to obtain the estimated glomerular filtration  rate (eGFR) is the CKD-EPI equation.        eGFR if    Date Value Ref Range Status   09/02/2020 >60.0 >60 mL/min/1.73 m^2 Final   02/11/2020 >60 >60 mL/min/1.73 m^2 Final   02/07/2020 >60 >60 mL/min/1.73 m^2 Final                  Appointments  past 12m or future 3m with PCP    Date Provider   Last Visit   9/16/2020 Masood Khan MD   Next Visit   Visit date not found Masood Khan MD   ED visits in past 90 days: 0     Note composed:5:39 PM 11/10/2020

## 2020-11-18 DIAGNOSIS — E11.3299 TYPE 2 DIABETES MELLITUS WITH MILD NONPROLIFERATIVE RETINOPATHY, WITH LONG-TERM CURRENT USE OF INSULIN, MACULAR EDEMA PRESENCE UNSPECIFIED, UNSPECIFIED LATERALITY: Primary | ICD-10-CM

## 2020-11-18 DIAGNOSIS — Z79.4 TYPE 2 DIABETES MELLITUS WITH MILD NONPROLIFERATIVE RETINOPATHY, WITH LONG-TERM CURRENT USE OF INSULIN, MACULAR EDEMA PRESENCE UNSPECIFIED, UNSPECIFIED LATERALITY: Primary | ICD-10-CM

## 2020-11-25 ENCOUNTER — TELEPHONE (OUTPATIENT)
Dept: FAMILY MEDICINE | Facility: CLINIC | Age: 60
End: 2020-11-25

## 2020-11-25 ENCOUNTER — LAB VISIT (OUTPATIENT)
Dept: LAB | Facility: HOSPITAL | Age: 60
End: 2020-11-25
Attending: FAMILY MEDICINE
Payer: COMMERCIAL

## 2020-11-25 DIAGNOSIS — Z79.4 TYPE 2 DIABETES MELLITUS WITH MILD NONPROLIFERATIVE RETINOPATHY, WITH LONG-TERM CURRENT USE OF INSULIN, MACULAR EDEMA PRESENCE UNSPECIFIED, UNSPECIFIED LATERALITY: Chronic | ICD-10-CM

## 2020-11-25 DIAGNOSIS — E11.3299 TYPE 2 DIABETES MELLITUS WITH MILD NONPROLIFERATIVE RETINOPATHY, WITH LONG-TERM CURRENT USE OF INSULIN, MACULAR EDEMA PRESENCE UNSPECIFIED, UNSPECIFIED LATERALITY: Chronic | ICD-10-CM

## 2020-11-25 DIAGNOSIS — Z79.4 TYPE 2 DIABETES MELLITUS WITH MILD NONPROLIFERATIVE RETINOPATHY, WITH LONG-TERM CURRENT USE OF INSULIN, MACULAR EDEMA PRESENCE UNSPECIFIED, UNSPECIFIED LATERALITY: ICD-10-CM

## 2020-11-25 DIAGNOSIS — E11.3299 TYPE 2 DIABETES MELLITUS WITH MILD NONPROLIFERATIVE RETINOPATHY, WITH LONG-TERM CURRENT USE OF INSULIN, MACULAR EDEMA PRESENCE UNSPECIFIED, UNSPECIFIED LATERALITY: ICD-10-CM

## 2020-11-25 LAB
ALBUMIN SERPL BCP-MCNC: 3.9 G/DL (ref 3.5–5.2)
ALP SERPL-CCNC: 76 U/L (ref 55–135)
ALT SERPL W/O P-5'-P-CCNC: 40 U/L (ref 10–44)
ANION GAP SERPL CALC-SCNC: 11 MMOL/L (ref 8–16)
AST SERPL-CCNC: 30 U/L (ref 10–40)
BILIRUB SERPL-MCNC: 0.9 MG/DL (ref 0.1–1)
BUN SERPL-MCNC: 15 MG/DL (ref 6–20)
CALCIUM SERPL-MCNC: 9.7 MG/DL (ref 8.7–10.5)
CHLORIDE SERPL-SCNC: 104 MMOL/L (ref 95–110)
CO2 SERPL-SCNC: 26 MMOL/L (ref 23–29)
CREAT SERPL-MCNC: 1 MG/DL (ref 0.5–1.4)
EST. GFR  (AFRICAN AMERICAN): >60 ML/MIN/1.73 M^2
EST. GFR  (NON AFRICAN AMERICAN): >60 ML/MIN/1.73 M^2
ESTIMATED AVG GLUCOSE: 183 MG/DL (ref 68–131)
GLUCOSE SERPL-MCNC: 123 MG/DL (ref 70–110)
HBA1C MFR BLD HPLC: 8 % (ref 4–5.6)
POTASSIUM SERPL-SCNC: 4.9 MMOL/L (ref 3.5–5.1)
PROT SERPL-MCNC: 7.1 G/DL (ref 6–8.4)
SODIUM SERPL-SCNC: 141 MMOL/L (ref 136–145)

## 2020-11-25 PROCEDURE — 36415 COLL VENOUS BLD VENIPUNCTURE: CPT | Mod: PO

## 2020-11-25 PROCEDURE — 80053 COMPREHEN METABOLIC PANEL: CPT

## 2020-11-25 PROCEDURE — 83036 HEMOGLOBIN GLYCOSYLATED A1C: CPT

## 2020-11-25 RX ORDER — INSULIN DEGLUDEC 100 U/ML
19 INJECTION, SOLUTION SUBCUTANEOUS NIGHTLY
Qty: 12 ML | Refills: 3
Start: 2020-11-25 | End: 2021-03-01 | Stop reason: SDUPTHER

## 2020-11-27 ENCOUNTER — TELEPHONE (OUTPATIENT)
Dept: FAMILY MEDICINE | Facility: CLINIC | Age: 60
End: 2020-11-27

## 2020-12-06 NOTE — TELEPHONE ENCOUNTER
No new care gaps identified.  Powered by [employer]. Reference number: [id number]. 12/06/2020 4:00:36 PM   CST

## 2020-12-07 RX ORDER — LISINOPRIL 5 MG/1
TABLET ORAL
Qty: 90 TABLET | Refills: 3 | Status: SHIPPED | OUTPATIENT
Start: 2020-12-07 | End: 2021-12-15

## 2020-12-07 NOTE — PROGRESS NOTES
Refill Authorization Note   Stanley Faustin  is requesting a refill authorization.  Brief Assessment and Rationale for Refill:  Approve     Medication Therapy Plan:  CDMR. approve     Medication Reconciliation Completed: No   Comments:       Requested Prescriptions   Pending Prescriptions Disp Refills    lisinopriL (PRINIVIL,ZESTRIL) 5 MG tablet [Pharmacy Med Name: LISINOPRIL 5MG TABLETS] 90 tablet 3     Sig: TAKE 1 TABLET BY MOUTH ONCE DAILY       Cardiovascular:  ACE Inhibitors Passed - 12/7/2020  4:08 PM        Passed - Patient is at least 18 years old        Passed - Last BP in normal range within 360 days.     BP Readings from Last 3 Encounters:   09/16/20 100/62   03/04/20 90/70   02/27/20 110/60              Passed - Office visit in past 12 months or future 90 days     Recent Outpatient Visits            2 months ago Routine physical examination    [facility] Masood Khan MD    9 months ago Chronic pansinusitis    [facility] Clifton Angel MD    9 months ago Chronic pansinusitis    [facility] Clifton Angel MD    9 months ago Chronic pansinusitis    [facility] Clifton Angel MD    10 months ago Chronic pansinusitis    [facility]          Future Appointments              [facility] 3 weeks [facility]                Passed - Cr is 1.4 or below and within 360 days     Creatinine   Date Value Ref Range Status   11/25/2020 1.0 0.5 - 1.4 mg/dL Final   09/02/2020 1.0 0.5 - 1.4 mg/dL Final   02/11/2020 1.1 0.5 - 1.4 mg/dL Final              Passed - K in normal range and within 360 days     Potassium   Date Value Ref Range Status   11/25/2020 4.9 3.5 - 5.1 mmol/L Final   09/02/2020 4.7 3.5 - 5.1 mmol/L Final   02/11/2020 4.6 3.5 - 5.1 mmol/L Final              Passed - eGFR within 360 days     eGFR if non    Date Value Ref Range Status   11/25/2020 >60.0  >60 mL/min/1.73 m^2 Final     Comment:     Calculation used to obtain the estimated glomerular filtration  rate (eGFR) is the CKD-EPI equation.      09/02/2020 >60.0 >60 mL/min/1.73 m^2 Final     Comment:     Calculation used to obtain the estimated glomerular filtration  rate (eGFR) is the CKD-EPI equation.      02/11/2020 >60 >60 mL/min/1.73 m^2 Final     Comment:     Calculation used to obtain the estimated glomerular filtration  rate (eGFR) is the CKD-EPI equation.        eGFR if    Date Value Ref Range Status   11/25/2020 >60.0 >60 mL/min/1.73 m^2 Final   09/02/2020 >60.0 >60 mL/min/1.73 m^2 Final   02/11/2020 >60 >60 mL/min/1.73 m^2 Final                  Appointments  past 12m or future 3m with PCP    Date Provider   Last Visit   9/16/2020 Masood Khan MD   Next Visit   Visit date not found Masood Khan MD   ED visits in past 90 days: 0     Note composed:4:09 PM 12/07/2020

## 2020-12-20 NOTE — TELEPHONE ENCOUNTER
No new care gaps identified.  Powered by [employer]. Reference number: [id number]. 12/20/2020 11:09:01 AM   CST

## 2020-12-22 RX ORDER — EMPAGLIFLOZIN 25 MG/1
TABLET, FILM COATED ORAL
Qty: 90 TABLET | Refills: 1 | Status: SHIPPED | OUTPATIENT
Start: 2020-12-22 | End: 2021-06-29

## 2020-12-23 NOTE — PROGRESS NOTES
Refill Routing Note   Medication(s) are not appropriate for processing by [facility] for the following reason(s):     - Medication not previously prescribed by PCP  [facility] action(s):  Defer     Medication Therapy Plan: CDMR. last script is prescribed by historical provider; please advise; defer   Medication reconciliation completed: No   Automatic Epic Generated Protocol Data:        Requested Prescriptions   Pending Prescriptions Disp Refills    JARDIANCE 25 mg tablet [Pharmacy Med Name: JARDIANCE 25MG TABLETS] 90 tablet 0     Sig: TAKE 1 TABLET BY MOUTH ONCE DAILY       Endocrinology:  Diabetes - SGLT2 Inhibitors Passed - 12/22/2020  6:15 PM        Passed - Patient is at least 18 years old        Passed - Last BP in normal range within 360 days.     BP Readings from Last 3 Encounters:   09/16/20 100/62   03/04/20 90/70   02/27/20 110/60              Passed - Office visit in past 12 months or future 90 days     Recent Outpatient Visits            3 months ago Routine physical examination    [facility] Masood Khan MD    9 months ago Chronic pansinusitis    [facility] [place] [facility] Clifton Angel MD    9 months ago Chronic pansinusitis    [facility] [place] [facility] Clifton Angel MD    10 months ago Chronic pansinusitis    [facility] [place] [facility] Clifton Angel MD    11 months ago Chronic pansinusitis    [facility] [place] [facility] Clifton Angel MD          Future Appointments              In 6 days LAB, [facility] [place]                Passed - Cr is 1.4 or below and within 360 days     Creatinine   Date Value Ref Range Status   11/25/2020 1.0 0.5 - 1.4 mg/dL Final   09/02/2020 1.0 0.5 - 1.4 mg/dL Final   02/11/2020 1.1 0.5 - 1.4 mg/dL Final              Passed - HBA1C is 8 or below and within 180 days     Hemoglobin A1C   Date Value Ref Range Status   11/25/2020 8.0 (H) 4.0 - 5.6 % Final     Comment:     ADA Screening Guidelines:  5.7-6.4%   Consistent with prediabetes  >or=6.5%  Consistent with diabetes  High levels of fetal hemoglobin interfere with the HbA1C  assay. Heterozygous hemoglobin variants (HbS, HgC, etc)do  not significantly interfere with this assay.   However, presence of multiple variants may affect accuracy.     09/02/2020 8.4 (H) 4.0 - 5.6 % Final     Comment:     ADA Screening Guidelines:  5.7-6.4%  Consistent with prediabetes  >or=6.5%  Consistent with diabetes  High levels of fetal hemoglobin interfere with the HbA1C  assay. Heterozygous hemoglobin variants (HbS, HgC, etc)do  not significantly interfere with this assay.   However, presence of multiple variants may affect accuracy.     12/02/2019 8.9 (H) 4.0 - 5.6 % Final     Comment:     ADA Screening Guidelines:  5.7-6.4%  Consistent with prediabetes  >or=6.5%  Consistent with diabetes  High levels of fetal hemoglobin interfere with the HbA1C  assay. Heterozygous hemoglobin variants (HbS, HgC, etc)do  not significantly interfere with this assay.   However, presence of multiple variants may affect accuracy.     04/07/2019 8.8 (H) % Final              Passed - eGFR is 45 or above and within 360 days     eGFR if non    Date Value Ref Range Status   11/25/2020 >60.0 >60 mL/min/1.73 m^2 Final     Comment:     Calculation used to obtain the estimated glomerular filtration  rate (eGFR) is the CKD-EPI equation.      09/02/2020 >60.0 >60 mL/min/1.73 m^2 Final     Comment:     Calculation used to obtain the estimated glomerular filtration  rate (eGFR) is the CKD-EPI equation.      02/11/2020 >60 >60 mL/min/1.73 m^2 Final     Comment:     Calculation used to obtain the estimated glomerular filtration  rate (eGFR) is the CKD-EPI equation.        eGFR if    Date Value Ref Range Status   11/25/2020 >60.0 >60 mL/min/1.73 m^2 Final   09/02/2020 >60.0 >60 mL/min/1.73 m^2 Final   02/11/2020 >60 >60 mL/min/1.73 m^2 Final                    Appointments  past 12m or  future 3m with PCP    Date Provider   Last Visit   9/16/2020 Masood Khan MD   Next Visit   Visit date not found Masood Khan MD   ED visits in past 90 days: 0        Note composed:6:17 PM 12/22/2020

## 2020-12-28 ENCOUNTER — LAB VISIT (OUTPATIENT)
Dept: LAB | Facility: HOSPITAL | Age: 60
End: 2020-12-28
Attending: FAMILY MEDICINE
Payer: COMMERCIAL

## 2020-12-28 DIAGNOSIS — Z79.4 TYPE 2 DIABETES MELLITUS WITH MILD NONPROLIFERATIVE RETINOPATHY, WITH LONG-TERM CURRENT USE OF INSULIN, MACULAR EDEMA PRESENCE UNSPECIFIED, UNSPECIFIED LATERALITY: Chronic | ICD-10-CM

## 2020-12-28 DIAGNOSIS — E11.3299 TYPE 2 DIABETES MELLITUS WITH MILD NONPROLIFERATIVE RETINOPATHY, WITH LONG-TERM CURRENT USE OF INSULIN, MACULAR EDEMA PRESENCE UNSPECIFIED, UNSPECIFIED LATERALITY: Chronic | ICD-10-CM

## 2020-12-28 LAB
ESTIMATED AVG GLUCOSE: 169 MG/DL (ref 68–131)
HBA1C MFR BLD HPLC: 7.5 % (ref 4–5.6)

## 2020-12-28 PROCEDURE — 83036 HEMOGLOBIN GLYCOSYLATED A1C: CPT

## 2020-12-28 PROCEDURE — 36415 COLL VENOUS BLD VENIPUNCTURE: CPT | Mod: PO

## 2021-01-21 DIAGNOSIS — Z95.1 HX OF CABG: ICD-10-CM

## 2021-01-22 RX ORDER — ATORVASTATIN CALCIUM 40 MG/1
TABLET, FILM COATED ORAL
Qty: 90 TABLET | Refills: 2 | Status: SHIPPED | OUTPATIENT
Start: 2021-01-22 | End: 2021-10-07 | Stop reason: SDUPTHER

## 2021-02-12 DIAGNOSIS — E11.3299 TYPE 2 DIABETES MELLITUS WITH MILD NONPROLIFERATIVE RETINOPATHY, WITH LONG-TERM CURRENT USE OF INSULIN, MACULAR EDEMA PRESENCE UNSPECIFIED, UNSPECIFIED LATERALITY: Chronic | ICD-10-CM

## 2021-02-12 DIAGNOSIS — Z79.4 TYPE 2 DIABETES MELLITUS WITH MILD NONPROLIFERATIVE RETINOPATHY, WITH LONG-TERM CURRENT USE OF INSULIN, MACULAR EDEMA PRESENCE UNSPECIFIED, UNSPECIFIED LATERALITY: Chronic | ICD-10-CM

## 2021-02-15 RX ORDER — NATEGLINIDE 120 MG/1
120 TABLET ORAL
Qty: 90 TABLET | Refills: 1 | Status: SHIPPED | OUTPATIENT
Start: 2021-02-15 | End: 2021-05-19 | Stop reason: SDUPTHER

## 2021-02-17 ENCOUNTER — PATIENT MESSAGE (OUTPATIENT)
Dept: FAMILY MEDICINE | Facility: CLINIC | Age: 61
End: 2021-02-17

## 2021-02-18 RX ORDER — METFORMIN HYDROCHLORIDE 1000 MG/1
1000 TABLET ORAL 2 TIMES DAILY WITH MEALS
Qty: 180 TABLET | Refills: 1 | Status: SHIPPED | OUTPATIENT
Start: 2021-02-18 | End: 2021-08-10

## 2021-02-24 ENCOUNTER — IMMUNIZATION (OUTPATIENT)
Dept: OBSTETRICS AND GYNECOLOGY | Facility: CLINIC | Age: 61
End: 2021-02-24
Payer: COMMERCIAL

## 2021-02-24 DIAGNOSIS — Z23 NEED FOR VACCINATION: Primary | ICD-10-CM

## 2021-02-24 PROCEDURE — 91300 COVID-19, MRNA, LNP-S, PF, 30 MCG/0.3 ML DOSE VACCINE: CPT | Mod: PBBFAC | Performed by: FAMILY MEDICINE

## 2021-02-28 ENCOUNTER — PATIENT MESSAGE (OUTPATIENT)
Dept: FAMILY MEDICINE | Facility: CLINIC | Age: 61
End: 2021-02-28

## 2021-02-28 DIAGNOSIS — Z79.4 TYPE 2 DIABETES MELLITUS WITH MILD NONPROLIFERATIVE RETINOPATHY, WITH LONG-TERM CURRENT USE OF INSULIN, MACULAR EDEMA PRESENCE UNSPECIFIED, UNSPECIFIED LATERALITY: Chronic | ICD-10-CM

## 2021-02-28 DIAGNOSIS — E11.3299 TYPE 2 DIABETES MELLITUS WITH MILD NONPROLIFERATIVE RETINOPATHY, WITH LONG-TERM CURRENT USE OF INSULIN, MACULAR EDEMA PRESENCE UNSPECIFIED, UNSPECIFIED LATERALITY: Chronic | ICD-10-CM

## 2021-03-01 RX ORDER — INSULIN DEGLUDEC 100 U/ML
20 INJECTION, SOLUTION SUBCUTANEOUS NIGHTLY
Qty: 15 ML | Refills: 3 | Status: SHIPPED | OUTPATIENT
Start: 2021-03-01 | End: 2021-04-12 | Stop reason: SDUPTHER

## 2021-03-05 ENCOUNTER — PATIENT MESSAGE (OUTPATIENT)
Dept: FAMILY MEDICINE | Facility: CLINIC | Age: 61
End: 2021-03-05

## 2021-03-05 DIAGNOSIS — Z12.11 ENCOUNTER FOR SCREENING FOR MALIGNANT NEOPLASM OF COLON: Primary | ICD-10-CM

## 2021-03-09 ENCOUNTER — PATIENT MESSAGE (OUTPATIENT)
Dept: FAMILY MEDICINE | Facility: CLINIC | Age: 61
End: 2021-03-09

## 2021-03-17 ENCOUNTER — IMMUNIZATION (OUTPATIENT)
Dept: OBSTETRICS AND GYNECOLOGY | Facility: CLINIC | Age: 61
End: 2021-03-17
Payer: COMMERCIAL

## 2021-03-17 DIAGNOSIS — Z23 NEED FOR VACCINATION: Primary | ICD-10-CM

## 2021-03-17 PROCEDURE — 0002A COVID-19, MRNA, LNP-S, PF, 30 MCG/0.3 ML DOSE VACCINE: CPT | Mod: PBBFAC | Performed by: FAMILY MEDICINE

## 2021-03-17 PROCEDURE — 91300 COVID-19, MRNA, LNP-S, PF, 30 MCG/0.3 ML DOSE VACCINE: CPT | Mod: PBBFAC | Performed by: FAMILY MEDICINE

## 2021-03-23 ENCOUNTER — PATIENT MESSAGE (OUTPATIENT)
Dept: FAMILY MEDICINE | Facility: CLINIC | Age: 61
End: 2021-03-23

## 2021-03-24 ENCOUNTER — PATIENT MESSAGE (OUTPATIENT)
Dept: FAMILY MEDICINE | Facility: CLINIC | Age: 61
End: 2021-03-24

## 2021-03-29 ENCOUNTER — LAB VISIT (OUTPATIENT)
Dept: FAMILY MEDICINE | Facility: CLINIC | Age: 61
End: 2021-03-29
Payer: COMMERCIAL

## 2021-03-29 DIAGNOSIS — Z01.818 PRE-OP TESTING: ICD-10-CM

## 2021-03-29 DIAGNOSIS — Z12.11 SPECIAL SCREENING FOR MALIGNANT NEOPLASMS, COLON: Primary | ICD-10-CM

## 2021-03-29 PROCEDURE — U0003 INFECTIOUS AGENT DETECTION BY NUCLEIC ACID (DNA OR RNA); SEVERE ACUTE RESPIRATORY SYNDROME CORONAVIRUS 2 (SARS-COV-2) (CORONAVIRUS DISEASE [COVID-19]), AMPLIFIED PROBE TECHNIQUE, MAKING USE OF HIGH THROUGHPUT TECHNOLOGIES AS DESCRIBED BY CMS-2020-01-R: HCPCS | Performed by: INTERNAL MEDICINE

## 2021-03-29 PROCEDURE — U0005 INFEC AGEN DETEC AMPLI PROBE: HCPCS | Performed by: INTERNAL MEDICINE

## 2021-03-29 RX ORDER — SODIUM, POTASSIUM,MAG SULFATES 17.5-3.13G
1 SOLUTION, RECONSTITUTED, ORAL ORAL DAILY
Qty: 1 KIT | Refills: 0 | Status: SHIPPED | OUTPATIENT
Start: 2021-03-29 | End: 2021-11-30

## 2021-03-30 LAB — SARS-COV-2 RNA RESP QL NAA+PROBE: NOT DETECTED

## 2021-03-31 ENCOUNTER — ANESTHESIA EVENT (OUTPATIENT)
Dept: ENDOSCOPY | Facility: HOSPITAL | Age: 61
End: 2021-03-31
Payer: COMMERCIAL

## 2021-04-01 ENCOUNTER — HOSPITAL ENCOUNTER (OUTPATIENT)
Facility: HOSPITAL | Age: 61
Discharge: HOME | End: 2021-04-01
Attending: INTERNAL MEDICINE | Admitting: INTERNAL MEDICINE
Payer: COMMERCIAL

## 2021-04-01 ENCOUNTER — ANESTHESIA (OUTPATIENT)
Dept: ENDOSCOPY | Facility: HOSPITAL | Age: 61
End: 2021-04-01
Payer: COMMERCIAL

## 2021-04-01 VITALS
HEART RATE: 90 BPM | DIASTOLIC BLOOD PRESSURE: 68 MMHG | TEMPERATURE: 98 F | SYSTOLIC BLOOD PRESSURE: 106 MMHG | OXYGEN SATURATION: 98 % | RESPIRATION RATE: 18 BRPM

## 2021-04-01 DIAGNOSIS — Z86.010 HISTORY OF COLON POLYPS: Primary | ICD-10-CM

## 2021-04-01 LAB — POCT GLUCOSE: 127 MG/DL (ref 70–110)

## 2021-04-01 PROCEDURE — D9220A PRA ANESTHESIA: ICD-10-PCS | Mod: 33,CRNA,, | Performed by: NURSE ANESTHETIST, CERTIFIED REGISTERED

## 2021-04-01 PROCEDURE — D9220A PRA ANESTHESIA: Mod: 33,ANES,, | Performed by: ANESTHESIOLOGY

## 2021-04-01 PROCEDURE — 25000003 PHARM REV CODE 250: Performed by: ANESTHESIOLOGY

## 2021-04-01 PROCEDURE — 88305 TISSUE EXAM BY PATHOLOGIST: ICD-10-PCS | Mod: 26,,, | Performed by: PATHOLOGY

## 2021-04-01 PROCEDURE — 27201012 HC FORCEPS, HOT/COLD, DISP: Performed by: INTERNAL MEDICINE

## 2021-04-01 PROCEDURE — 37000008 HC ANESTHESIA 1ST 15 MINUTES: Performed by: INTERNAL MEDICINE

## 2021-04-01 PROCEDURE — 45380 COLONOSCOPY AND BIOPSY: CPT | Mod: 33,,, | Performed by: INTERNAL MEDICINE

## 2021-04-01 PROCEDURE — 88305 TISSUE EXAM BY PATHOLOGIST: CPT | Performed by: PATHOLOGY

## 2021-04-01 PROCEDURE — 25000003 PHARM REV CODE 250: Performed by: NURSE ANESTHETIST, CERTIFIED REGISTERED

## 2021-04-01 PROCEDURE — 45380 COLONOSCOPY AND BIOPSY: CPT | Performed by: INTERNAL MEDICINE

## 2021-04-01 PROCEDURE — 37000009 HC ANESTHESIA EA ADD 15 MINS: Performed by: INTERNAL MEDICINE

## 2021-04-01 PROCEDURE — D9220A PRA ANESTHESIA: Mod: 33,CRNA,, | Performed by: NURSE ANESTHETIST, CERTIFIED REGISTERED

## 2021-04-01 PROCEDURE — 45380 PR COLONOSCOPY,BIOPSY: ICD-10-PCS | Mod: 33,,, | Performed by: INTERNAL MEDICINE

## 2021-04-01 PROCEDURE — 88305 TISSUE EXAM BY PATHOLOGIST: CPT | Mod: 26,,, | Performed by: PATHOLOGY

## 2021-04-01 PROCEDURE — D9220A PRA ANESTHESIA: ICD-10-PCS | Mod: 33,ANES,, | Performed by: ANESTHESIOLOGY

## 2021-04-01 PROCEDURE — 63600175 PHARM REV CODE 636 W HCPCS: Performed by: NURSE ANESTHETIST, CERTIFIED REGISTERED

## 2021-04-01 RX ORDER — SODIUM CHLORIDE 9 MG/ML
INJECTION, SOLUTION INTRAVENOUS CONTINUOUS
Status: DISCONTINUED | OUTPATIENT
Start: 2021-04-01 | End: 2021-04-01 | Stop reason: HOSPADM

## 2021-04-01 RX ORDER — LIDOCAINE HYDROCHLORIDE 20 MG/ML
INJECTION INTRAVENOUS
Status: DISCONTINUED | OUTPATIENT
Start: 2021-04-01 | End: 2021-04-01

## 2021-04-01 RX ORDER — LIDOCAINE HYDROCHLORIDE 20 MG/ML
INJECTION, SOLUTION EPIDURAL; INFILTRATION; INTRACAUDAL; PERINEURAL
Status: DISCONTINUED
Start: 2021-04-01 | End: 2021-04-01 | Stop reason: HOSPADM

## 2021-04-01 RX ORDER — PHENYLEPHRINE HYDROCHLORIDE 10 MG/ML
INJECTION INTRAVENOUS
Status: DISCONTINUED | OUTPATIENT
Start: 2021-04-01 | End: 2021-04-01

## 2021-04-01 RX ORDER — PROPOFOL 10 MG/ML
INJECTION, EMULSION INTRAVENOUS
Status: DISCONTINUED
Start: 2021-04-01 | End: 2021-04-01 | Stop reason: HOSPADM

## 2021-04-01 RX ORDER — PHENYLEPHRINE HCL IN 0.9% NACL 1 MG/10 ML
SYRINGE (ML) INTRAVENOUS
Status: DISCONTINUED
Start: 2021-04-01 | End: 2021-04-01 | Stop reason: HOSPADM

## 2021-04-01 RX ORDER — LIDOCAINE HYDROCHLORIDE 10 MG/ML
1 INJECTION, SOLUTION EPIDURAL; INFILTRATION; INTRACAUDAL; PERINEURAL ONCE
Status: DISCONTINUED | OUTPATIENT
Start: 2021-04-01 | End: 2021-04-01 | Stop reason: HOSPADM

## 2021-04-01 RX ORDER — PROPOFOL 10 MG/ML
VIAL (ML) INTRAVENOUS
Status: DISCONTINUED | OUTPATIENT
Start: 2021-04-01 | End: 2021-04-01

## 2021-04-01 RX ADMIN — PROPOFOL 100 MG: 10 INJECTION, EMULSION INTRAVENOUS at 11:04

## 2021-04-01 RX ADMIN — SODIUM CHLORIDE: 0.9 INJECTION, SOLUTION INTRAVENOUS at 10:04

## 2021-04-01 RX ADMIN — PROPOFOL 30 MG: 10 INJECTION, EMULSION INTRAVENOUS at 11:04

## 2021-04-01 RX ADMIN — PROPOFOL 20 MG: 10 INJECTION, EMULSION INTRAVENOUS at 11:04

## 2021-04-01 RX ADMIN — PHENYLEPHRINE HYDROCHLORIDE 100 MCG: 10 INJECTION INTRAVENOUS at 11:04

## 2021-04-01 RX ADMIN — Medication 60 MG: at 11:04

## 2021-04-04 ENCOUNTER — PATIENT MESSAGE (OUTPATIENT)
Dept: FAMILY MEDICINE | Facility: CLINIC | Age: 61
End: 2021-04-04

## 2021-04-05 ENCOUNTER — TELEPHONE (OUTPATIENT)
Dept: FAMILY MEDICINE | Facility: CLINIC | Age: 61
End: 2021-04-05

## 2021-04-05 DIAGNOSIS — E11.3299 TYPE 2 DIABETES MELLITUS WITH MILD NONPROLIFERATIVE RETINOPATHY, WITH LONG-TERM CURRENT USE OF INSULIN, MACULAR EDEMA PRESENCE UNSPECIFIED, UNSPECIFIED LATERALITY: Primary | ICD-10-CM

## 2021-04-05 DIAGNOSIS — Z79.4 TYPE 2 DIABETES MELLITUS WITH MILD NONPROLIFERATIVE RETINOPATHY, WITH LONG-TERM CURRENT USE OF INSULIN, MACULAR EDEMA PRESENCE UNSPECIFIED, UNSPECIFIED LATERALITY: Primary | ICD-10-CM

## 2021-04-06 ENCOUNTER — PATIENT MESSAGE (OUTPATIENT)
Dept: ADMINISTRATIVE | Facility: HOSPITAL | Age: 61
End: 2021-04-06

## 2021-04-06 LAB
FINAL PATHOLOGIC DIAGNOSIS: NORMAL
GROSS: NORMAL
Lab: NORMAL

## 2021-04-07 ENCOUNTER — HOSPITAL ENCOUNTER (OUTPATIENT)
Dept: RADIOLOGY | Facility: HOSPITAL | Age: 61
Discharge: HOME | End: 2021-04-07
Attending: FAMILY MEDICINE
Payer: COMMERCIAL

## 2021-04-07 ENCOUNTER — PATIENT MESSAGE (OUTPATIENT)
Dept: GASTROENTEROLOGY | Facility: CLINIC | Age: 61
End: 2021-04-07

## 2021-04-07 DIAGNOSIS — Z01.818 PRE-OP EVALUATION: ICD-10-CM

## 2021-04-07 PROCEDURE — 71046 X-RAY EXAM CHEST 2 VIEWS: CPT | Mod: TC,FY,PO

## 2021-04-07 PROCEDURE — 71046 X-RAY EXAM CHEST 2 VIEWS: CPT | Mod: 26,,, | Performed by: RADIOLOGY

## 2021-04-07 PROCEDURE — 71046 XR CHEST PA AND LATERAL: ICD-10-PCS | Mod: 26,,, | Performed by: RADIOLOGY

## 2021-04-10 ENCOUNTER — PATIENT MESSAGE (OUTPATIENT)
Dept: FAMILY MEDICINE | Facility: CLINIC | Age: 61
End: 2021-04-10

## 2021-04-10 DIAGNOSIS — E11.3299 TYPE 2 DIABETES MELLITUS WITH MILD NONPROLIFERATIVE RETINOPATHY, WITH LONG-TERM CURRENT USE OF INSULIN, MACULAR EDEMA PRESENCE UNSPECIFIED, UNSPECIFIED LATERALITY: Chronic | ICD-10-CM

## 2021-04-10 DIAGNOSIS — Z79.4 TYPE 2 DIABETES MELLITUS WITH MILD NONPROLIFERATIVE RETINOPATHY, WITH LONG-TERM CURRENT USE OF INSULIN, MACULAR EDEMA PRESENCE UNSPECIFIED, UNSPECIFIED LATERALITY: Chronic | ICD-10-CM

## 2021-04-11 DIAGNOSIS — N52.9 ERECTILE DYSFUNCTION, UNSPECIFIED ERECTILE DYSFUNCTION TYPE: ICD-10-CM

## 2021-04-12 RX ORDER — INSULIN DEGLUDEC 100 U/ML
25 INJECTION, SOLUTION SUBCUTANEOUS NIGHTLY
Qty: 15 ML | Refills: 3
Start: 2021-04-12 | End: 2021-06-25 | Stop reason: SDUPTHER

## 2021-04-15 RX ORDER — SILDENAFIL 100 MG/1
TABLET, FILM COATED ORAL
Qty: 30 TABLET | Refills: 5 | Status: SHIPPED | OUTPATIENT
Start: 2021-04-15 | End: 2021-11-30

## 2021-05-12 DIAGNOSIS — E11.9 TYPE 2 DIABETES MELLITUS WITHOUT COMPLICATION: ICD-10-CM

## 2021-05-19 DIAGNOSIS — Z79.4 TYPE 2 DIABETES MELLITUS WITH MILD NONPROLIFERATIVE RETINOPATHY, WITH LONG-TERM CURRENT USE OF INSULIN, MACULAR EDEMA PRESENCE UNSPECIFIED, UNSPECIFIED LATERALITY: Chronic | ICD-10-CM

## 2021-05-19 DIAGNOSIS — E11.3299 TYPE 2 DIABETES MELLITUS WITH MILD NONPROLIFERATIVE RETINOPATHY, WITH LONG-TERM CURRENT USE OF INSULIN, MACULAR EDEMA PRESENCE UNSPECIFIED, UNSPECIFIED LATERALITY: Chronic | ICD-10-CM

## 2021-05-21 RX ORDER — NATEGLINIDE 120 MG/1
120 TABLET ORAL
Qty: 270 TABLET | Refills: 0 | Status: SHIPPED | OUTPATIENT
Start: 2021-05-21 | End: 2021-08-13

## 2021-06-24 ENCOUNTER — PATIENT MESSAGE (OUTPATIENT)
Dept: FAMILY MEDICINE | Facility: CLINIC | Age: 61
End: 2021-06-24

## 2021-06-24 DIAGNOSIS — E11.3299 TYPE 2 DIABETES MELLITUS WITH MILD NONPROLIFERATIVE RETINOPATHY, WITH LONG-TERM CURRENT USE OF INSULIN, MACULAR EDEMA PRESENCE UNSPECIFIED, UNSPECIFIED LATERALITY: Chronic | ICD-10-CM

## 2021-06-24 DIAGNOSIS — Z79.4 TYPE 2 DIABETES MELLITUS WITH MILD NONPROLIFERATIVE RETINOPATHY, WITH LONG-TERM CURRENT USE OF INSULIN, MACULAR EDEMA PRESENCE UNSPECIFIED, UNSPECIFIED LATERALITY: Chronic | ICD-10-CM

## 2021-06-25 RX ORDER — INSULIN DEGLUDEC 100 U/ML
25 INJECTION, SOLUTION SUBCUTANEOUS NIGHTLY
Qty: 15 PEN | Refills: 3 | Status: SHIPPED | OUTPATIENT
Start: 2021-06-25 | End: 2021-09-20 | Stop reason: SDUPTHER

## 2021-06-26 DIAGNOSIS — E78.00 HYPERCHOLESTEROLEMIA: Primary | ICD-10-CM

## 2021-06-29 RX ORDER — EMPAGLIFLOZIN 25 MG/1
TABLET, FILM COATED ORAL
Qty: 90 TABLET | Refills: 3 | Status: SHIPPED | OUTPATIENT
Start: 2021-06-29 | End: 2022-04-13 | Stop reason: SDUPTHER

## 2021-07-06 ENCOUNTER — PATIENT MESSAGE (OUTPATIENT)
Dept: ADMINISTRATIVE | Facility: HOSPITAL | Age: 61
End: 2021-07-06

## 2021-07-21 DIAGNOSIS — E11.9 TYPE 2 DIABETES MELLITUS WITHOUT COMPLICATION: ICD-10-CM

## 2021-08-04 ENCOUNTER — PATIENT MESSAGE (OUTPATIENT)
Dept: ADMINISTRATIVE | Facility: HOSPITAL | Age: 61
End: 2021-08-04

## 2021-08-12 DIAGNOSIS — E11.3299 TYPE 2 DIABETES MELLITUS WITH MILD NONPROLIFERATIVE RETINOPATHY, WITH LONG-TERM CURRENT USE OF INSULIN, MACULAR EDEMA PRESENCE UNSPECIFIED, UNSPECIFIED LATERALITY: Chronic | ICD-10-CM

## 2021-08-12 DIAGNOSIS — Z79.4 TYPE 2 DIABETES MELLITUS WITH MILD NONPROLIFERATIVE RETINOPATHY, WITH LONG-TERM CURRENT USE OF INSULIN, MACULAR EDEMA PRESENCE UNSPECIFIED, UNSPECIFIED LATERALITY: Chronic | ICD-10-CM

## 2021-08-16 RX ORDER — METOPROLOL TARTRATE 25 MG/1
12.5 TABLET, FILM COATED ORAL 2 TIMES DAILY
Qty: 90 TABLET | Refills: 0 | Status: SHIPPED | OUTPATIENT
Start: 2021-08-16 | End: 2021-11-30 | Stop reason: SDUPTHER

## 2021-08-16 RX ORDER — NATEGLINIDE 120 MG/1
TABLET ORAL
Qty: 270 TABLET | Refills: 0 | Status: SHIPPED | OUTPATIENT
Start: 2021-08-16 | End: 2021-09-15

## 2021-08-22 NOTE — DISCHARGE INSTRUCTIONS
INSTRUCTIONS TO FOLLOW AFTER SINUS AND NASAL SURGERY  DR. ANGEL - [facility]        No driving, alcoholic beverages or signing legal documents for next 24 hours or while taking pain medication.       Office hours:  Weekdays 8:00 am to 5:00 pm.  Please call [phone] and ask to speak with one of his medical assistants, Cindy or aDniel, at the [facility].    After-hours & weekends:  Please call [phone] and ask to speak with the ENT resident doctor.    Your first office visit with Dr. Angel after surgery should have been already scheduled.  If you don't know when it is, call our office at [phone] and we can help you schedule   the appointment or notify you of the scheduled time. Normally there will be a follow-up visit each week for two to three weeks after surgery.     Please call IMMMEDIATELY if you have:   Temperature of 101° F or greater   Any unusual, painful swelling   Any active bleeding that saturates more than a 4x4 gauze   Any thick drainage green or yellow drainage   Changes in vision or swelling around the eye   Pain not relieved by your prescribed pain medication    ACTIVITY:  Sleep on your back with the head of the bead elevated, up on 2-3 pillows, or in a recliner for the first 3 to 5 days. This will help with swelling.     After surgery you may have a lot of nasal drainage. This is normal. You may breathe through your nose after surgery, though may notice you feel congested because of swelling and absorbable sponges placed in the nose. Nasal breathing usually improves significantly after your first visit after surgery when this material is removed.     You may wake up after surgery with thick white stockings on. Wear them until you are walking around more. It is important to walk around often while at home to keep your blood circulating and prevent blood clots.    If you use CPAP or BiPAP to sleep at night, you should wait at least 48 hours before resuming use.   Pt says he wears a CPAP of 17+ HS at home but currently his home machine is on recall  Pt set up with hospital CPAP 17+ 21%  Agrees to wear tonight while sleeping  Pt on room air, appears comfortable  Will follow  Dr. Angel will advise you when it is safe to do this.  You may shower after surgery.    RESTRICTED ACTIVITIES AFTER SURGERY:   Do NOT blow your nose for 2 weeks. If you have to sneeze or cough, do so with your mouth open. Allow drainage from nose to fall on a mustache dressing (gauze placed under nose) and change it as needed, or gently wipe outside of nose without blowing.   AVOID all heavy lifting, straining or bending for 2 weeks.    AVOID any sexual activity for 2 weeks after surgery.   AVOID semi-contact sports or vigorous exercising for 3-4 weeks. Dr. Angel will let you know when you are cleared to resume exercise.   AVOID flying or swimming for 2 weeks.     Do NOT operate a motor vehicle or any type of heavy machinery within 24 hours of taking pain medication.   DO NOT smoke or be around smokers.   AVOID irritating substances that might make you sneeze, such as dust, chalk, harsh chemicals, and allergic triggers.  This might also include spicy foods.    DRESSINGS:  Change the gauze mustache dressing under your nose as needed. (If unsure what this dressing is or how to do this, ask your doctor or nurse before you leave the hospital.) You may have pinkish-red drainage for 2-3 days, small amounts of bleeding may occur until the nose heals; you should notify us if there is significant bleeding and if there is ongoing bleeding which is not quickly stopping.    A dissolvable sponge will often be placed into the sinuses during surgery. These absorbable dressings are easily and gently removed at your first visit after surgery (often about one week after). These must be kept moist with nasal saline irrigations to allow them to help the nose heal and prevent them from becoming hard and less easily removed - another reason for the importance of your nasal irrigations. Absorbable stents may also be placed. You may notice small fragments of these items come out of your nose in the weeks following surgery (the  stents may look like small fish bones if pieces come out); this is not problematic if parts of these materials come out. Usually there is no gauze packing placed inside the nose -- If packing is necessary, you will be informed of this and instructions given.    MEDICATIONS:  After surgery, you will be sent home with prescriptions for:    Pain medication - take as needed for more severe pain, but Tylenol and ibuprofen is usually sufficient to control most of the pain related to surgery    Anti-nausea medication - for use as needed after surgery    Antibiotics - take all of this medication as instructed until completed.     Steroid - take according to specific instructions until completed:  Prednisone, 10mg tablets: 3 tablets per day for the first 4 days, 2 tablets per day for the next 4 days, then 1 tablet per day for the next 4 days -- a total of 24 tablets and 12 days to complete the taper of this medication.   Steroids can cause difficulty sleeping, so I recommend you take this medication in the morning. If you still have trouble sleeping, taking melatonin (3-5mg) at night may help.   For individuals with diabetes: Steroids increase blood sugars. It is critical to monitor blood glucose carefully and adjust insulin as needed to control blood sugars while taking this medication.     Most people need pain medication for the first few days after surgery, although a narcotic is rarely necessary.  The best pain control comes from a combination of ACETAMINOPHEN (Tylenol) and IBUPROFEN (Motrin).  You will be given prescriptions for these so you have the recommended dose and usage instructions, but can use any over-the-counter versions of acetaminophen and ibuprofen.  These can be alternated so that you are taking something about every 3 hours while awake.     Some people have problems with bowel movements after surgery. If you have NOT had a bowel movement 3-5 days after surgery, go to your local pharmacy and purchase  an over the counter stool softener such as COLACE. You can also ask the pharmacist for his or her recommendation. If you still do not have a bowel movement after starting the softener, please call the office. Antibiotics can also upset your stomach and cause nausea, diarrhea, and/or constipation by reducing the good bacteria in your gut. Replenish the good bacteria by consuming PROBIOTICS either as supplement pills, eating probiotic yogurt, or drinking kefir yogurt drink. Look for terms like live and active cultures or live probiotics on the product - a common brand of probiotic yogurt is [employer].    You may be given an ointment called MUPIROCIN to use after surgery.  If you are given this, use a cotton swab to apply gently inside the nostrils.  Do this 2 times a day for 1 week.    You will need these over-the-counter medications after surgery:  SALINE SINUS RINSE ([employer] brand): You will use this saline sinus irrigation to rinse out your nose and sinuses after surgery.  Begin doing this the day after surgery, unless instructed otherwise by Dr. Angel.  You should do a full bottle, half on one side of your nose and half on the other, 2 times per day (or more if able to, you cannot do this too much). These are very important to allow the nose to heal well, remove debris, and reduce risk of unwanted scar tissue from forming. Follow the instructions on the box: mix the salt packet with clean water (bottle, previously boiled, distilled, etc -- not tap water) to the line on the bottle to make the irrigation.    AFRIN (regular strength): Only use if you have nasal congestion or bleeding. Use 2 times per day for 3 days, stop for 1 day, continue 2 times per day for 3 days, then stop completely. This is very successful at resolving minor nose bleeds following surgery. The generic drug name for Afrin is oxymetazoline, and it is sold as a nasal decongestant.  NOTE:  You may not need to do this at all.    DIET:   Avoid  hot and spicy foods for 1 week after surgery.   Begin with bland foods the evening after surgery and advance to your regular diet as tolerated.  It is not necessary to take only soft food unless you are recovering from tonsil surgery.   Drink plenty of fluids (water is best).    Avoid alcoholic and caffeinated beverages for 1 week after surgery because they can cause you to become dehydrated.      Following these instructions, remembering to do the sinus irrigations, and returning for your follow-up visits after surgery will make sure you have the best possible result. Wishing you a quick and easy recovery from surgery, and looking forward to your improvements.     Clifton Angel MD  Rhinology, Sinus, and Skull Base Surgery  Department of Otorhinolaryngology              Fall Prevention  Millions of people fall every year and injure themselves. You may have had anesthesia or sedation which may increase your risk of falling. You may have health issues that put you at an increased risk of falling.     Here are ways to reduce your risk of falling.  ·   · Make your home safe by keeping walkways clear of objects you may trip over.  · Use non-slip pads under rugs. Do not use area rugs or small throw rugs.  · Use non-slip mats in bathtubs and showers.  · Install handrails and lights on staircases.  · Do not walk in poorly lit areas.  · Do not stand on chairs or wobbly ladders.  · Use caution when reaching overhead or looking upward. This position can cause a loss of balance.  · Be sure your shoes fit properly, have non-slip bottoms and are in good condition.   · Wear shoes both inside and out. Avoid going barefoot or wearing slippers.  · Be cautious when going up and down stairs, curbs, and when walking on uneven sidewalks.  · If your balance is poor, consider using a cane or walker.  · If your fall was related to alcohol use, stop or limit alcohol intake.   · If your fall was related to use of sleeping medicines, talk  to your doctor about this. You may need to reduce your dosage at bedtime if you awaken during the night to go to the bathroom.    · To reduce the need for nighttime bathroom trips:  ¨ Avoid drinking fluids for several hours before going to bed  ¨ Empty your bladder before going to bed  ¨ Men can keep a urinal at the bedside  · Stay as active as you can. Balance, flexibility, strength, and endurance all come from exercise. They all play a role in preventing falls. Ask your healthcare provider which types of activity are right for you.  · Get your vision checked on a regular basis.  · If you have pets, know where they are before you stand up or walk so you don't trip over them.  Use night lights.

## 2021-09-16 ENCOUNTER — PATIENT MESSAGE (OUTPATIENT)
Dept: ADMINISTRATIVE | Facility: HOSPITAL | Age: 61
End: 2021-09-16

## 2021-09-16 RX ORDER — PEN NEEDLE, DIABETIC 30 GX3/16"
1 NEEDLE, DISPOSABLE MISCELLANEOUS 3 TIMES DAILY
Qty: 200 EACH | Refills: 5 | Status: SHIPPED | OUTPATIENT
Start: 2021-09-16 | End: 2021-09-20 | Stop reason: SDUPTHER

## 2021-09-16 RX ORDER — PEN NEEDLE, DIABETIC 30 GX3/16"
1 NEEDLE, DISPOSABLE MISCELLANEOUS 3 TIMES DAILY
Qty: 200 EACH | Refills: 5 | Status: SHIPPED | OUTPATIENT
Start: 2021-09-16 | End: 2021-09-16 | Stop reason: SDUPTHER

## 2021-09-20 DIAGNOSIS — Z79.4 TYPE 2 DIABETES MELLITUS WITH MILD NONPROLIFERATIVE RETINOPATHY, WITH LONG-TERM CURRENT USE OF INSULIN, MACULAR EDEMA PRESENCE UNSPECIFIED, UNSPECIFIED LATERALITY: Chronic | ICD-10-CM

## 2021-09-20 DIAGNOSIS — E11.3299 TYPE 2 DIABETES MELLITUS WITH MILD NONPROLIFERATIVE RETINOPATHY, WITH LONG-TERM CURRENT USE OF INSULIN, MACULAR EDEMA PRESENCE UNSPECIFIED, UNSPECIFIED LATERALITY: Chronic | ICD-10-CM

## 2021-09-20 RX ORDER — INSULIN DEGLUDEC 100 U/ML
25 INJECTION, SOLUTION SUBCUTANEOUS NIGHTLY
Qty: 15 PEN | Refills: 0 | Status: SHIPPED | OUTPATIENT
Start: 2021-09-20 | End: 2021-12-06 | Stop reason: SDUPTHER

## 2021-09-20 RX ORDER — PEN NEEDLE, DIABETIC 30 GX3/16"
1 NEEDLE, DISPOSABLE MISCELLANEOUS 3 TIMES DAILY
Qty: 200 EACH | Refills: 0 | Status: SHIPPED | OUTPATIENT
Start: 2021-09-20 | End: 2021-09-29 | Stop reason: SDUPTHER

## 2021-09-29 RX ORDER — PEN NEEDLE, DIABETIC 30 GX3/16"
1 NEEDLE, DISPOSABLE MISCELLANEOUS 3 TIMES DAILY
Qty: 200 EACH | Refills: 0 | Status: SHIPPED | OUTPATIENT
Start: 2021-09-29 | End: 2022-07-05 | Stop reason: SDUPTHER

## 2021-10-06 DIAGNOSIS — E11.9 TYPE 2 DIABETES MELLITUS WITHOUT COMPLICATION, UNSPECIFIED WHETHER LONG TERM INSULIN USE: ICD-10-CM

## 2021-10-07 DIAGNOSIS — Z95.1 HX OF CABG: ICD-10-CM

## 2021-10-07 RX ORDER — ATORVASTATIN CALCIUM 40 MG/1
40 TABLET, FILM COATED ORAL DAILY
Qty: 90 TABLET | Refills: 0 | Status: SHIPPED | OUTPATIENT
Start: 2021-10-07 | End: 2022-01-06

## 2021-10-29 ENCOUNTER — PATIENT MESSAGE (OUTPATIENT)
Dept: FAMILY MEDICINE | Facility: CLINIC | Age: 61
End: 2021-10-29
Payer: COMMERCIAL

## 2021-10-29 DIAGNOSIS — E11.3299 TYPE 2 DIABETES MELLITUS WITH MILD NONPROLIFERATIVE RETINOPATHY, WITH LONG-TERM CURRENT USE OF INSULIN, MACULAR EDEMA PRESENCE UNSPECIFIED, UNSPECIFIED LATERALITY: Chronic | ICD-10-CM

## 2021-10-29 DIAGNOSIS — Z79.4 TYPE 2 DIABETES MELLITUS WITH MILD NONPROLIFERATIVE RETINOPATHY, WITH LONG-TERM CURRENT USE OF INSULIN, MACULAR EDEMA PRESENCE UNSPECIFIED, UNSPECIFIED LATERALITY: Chronic | ICD-10-CM

## 2021-10-29 RX ORDER — NATEGLINIDE 120 MG/1
120 TABLET ORAL
Qty: 90 TABLET | Refills: 0 | OUTPATIENT
Start: 2021-10-29

## 2021-11-26 DIAGNOSIS — E11.3299 TYPE 2 DIABETES MELLITUS WITH MILD NONPROLIFERATIVE RETINOPATHY, WITH LONG-TERM CURRENT USE OF INSULIN, MACULAR EDEMA PRESENCE UNSPECIFIED, UNSPECIFIED LATERALITY: Chronic | ICD-10-CM

## 2021-11-26 DIAGNOSIS — Z79.4 TYPE 2 DIABETES MELLITUS WITH MILD NONPROLIFERATIVE RETINOPATHY, WITH LONG-TERM CURRENT USE OF INSULIN, MACULAR EDEMA PRESENCE UNSPECIFIED, UNSPECIFIED LATERALITY: Chronic | ICD-10-CM

## 2021-11-27 RX ORDER — NATEGLINIDE 120 MG/1
TABLET ORAL
Qty: 270 TABLET | OUTPATIENT
Start: 2021-11-27

## 2021-11-30 ENCOUNTER — OFFICE VISIT (OUTPATIENT)
Dept: FAMILY MEDICINE | Facility: CLINIC | Age: 61
End: 2021-11-30
Payer: COMMERCIAL

## 2021-11-30 VITALS
DIASTOLIC BLOOD PRESSURE: 68 MMHG | WEIGHT: 197.44 LBS | HEIGHT: 70 IN | SYSTOLIC BLOOD PRESSURE: 116 MMHG | HEART RATE: 106 BPM | BODY MASS INDEX: 28.27 KG/M2 | TEMPERATURE: 98 F | OXYGEN SATURATION: 98 %

## 2021-11-30 DIAGNOSIS — E11.3299 TYPE 2 DIABETES MELLITUS WITH MILD NONPROLIFERATIVE RETINOPATHY, WITH LONG-TERM CURRENT USE OF INSULIN, MACULAR EDEMA PRESENCE UNSPECIFIED, UNSPECIFIED LATERALITY: Chronic | ICD-10-CM

## 2021-11-30 DIAGNOSIS — Z23 NEED FOR SHINGLES VACCINE: ICD-10-CM

## 2021-11-30 DIAGNOSIS — N52.9 ERECTILE DYSFUNCTION, UNSPECIFIED ERECTILE DYSFUNCTION TYPE: ICD-10-CM

## 2021-11-30 DIAGNOSIS — I10 ESSENTIAL HYPERTENSION, BENIGN: Chronic | ICD-10-CM

## 2021-11-30 DIAGNOSIS — Z00.00 ROUTINE PHYSICAL EXAMINATION: Primary | ICD-10-CM

## 2021-11-30 DIAGNOSIS — Z79.4 TYPE 2 DIABETES MELLITUS WITH MILD NONPROLIFERATIVE RETINOPATHY, WITH LONG-TERM CURRENT USE OF INSULIN, MACULAR EDEMA PRESENCE UNSPECIFIED, UNSPECIFIED LATERALITY: Chronic | ICD-10-CM

## 2021-11-30 DIAGNOSIS — Z72.0 TOBACCO ABUSE: ICD-10-CM

## 2021-11-30 PROBLEM — E11.22 TYPE 2 DIABETES MELLITUS WITH STAGE 3A CHRONIC KIDNEY DISEASE, WITHOUT LONG-TERM CURRENT USE OF INSULIN: Status: ACTIVE | Noted: 2019-04-22

## 2021-11-30 PROBLEM — E11.22 TYPE 2 DIABETES MELLITUS WITH STAGE 3A CHRONIC KIDNEY DISEASE, WITHOUT LONG-TERM CURRENT USE OF INSULIN: Chronic | Status: ACTIVE | Noted: 2019-04-22

## 2021-11-30 PROBLEM — N18.31 TYPE 2 DIABETES MELLITUS WITH STAGE 3A CHRONIC KIDNEY DISEASE, WITHOUT LONG-TERM CURRENT USE OF INSULIN: Chronic | Status: ACTIVE | Noted: 2019-04-22

## 2021-11-30 PROBLEM — N18.31 TYPE 2 DIABETES MELLITUS WITH STAGE 3A CHRONIC KIDNEY DISEASE, WITHOUT LONG-TERM CURRENT USE OF INSULIN: Status: ACTIVE | Noted: 2019-04-22

## 2021-11-30 PROCEDURE — 3066F PR DOCUMENTATION OF TREATMENT FOR NEPHROPATHY: ICD-10-PCS | Mod: CPTII,S$GLB,, | Performed by: FAMILY MEDICINE

## 2021-11-30 PROCEDURE — 4010F PR ACE/ARB THEARPY RXD/TAKEN: ICD-10-PCS | Mod: CPTII,S$GLB,, | Performed by: FAMILY MEDICINE

## 2021-11-30 PROCEDURE — 3060F PR POS MICROALBUMINURIA RESULT DOCUMENTED/REVIEW: ICD-10-PCS | Mod: CPTII,S$GLB,, | Performed by: FAMILY MEDICINE

## 2021-11-30 PROCEDURE — 4010F ACE/ARB THERAPY RXD/TAKEN: CPT | Mod: CPTII,S$GLB,, | Performed by: FAMILY MEDICINE

## 2021-11-30 PROCEDURE — 99396 PREV VISIT EST AGE 40-64: CPT | Mod: 25,S$GLB,, | Performed by: FAMILY MEDICINE

## 2021-11-30 PROCEDURE — 99999 PR PBB SHADOW E&M-EST. PATIENT-LVL IV: ICD-10-PCS | Mod: PBBFAC,,, | Performed by: FAMILY MEDICINE

## 2021-11-30 PROCEDURE — 90471 IMMUNIZATION ADMIN: CPT | Mod: S$GLB,,, | Performed by: FAMILY MEDICINE

## 2021-11-30 PROCEDURE — 3066F NEPHROPATHY DOC TX: CPT | Mod: CPTII,S$GLB,, | Performed by: FAMILY MEDICINE

## 2021-11-30 PROCEDURE — 3060F POS MICROALBUMINURIA REV: CPT | Mod: CPTII,S$GLB,, | Performed by: FAMILY MEDICINE

## 2021-11-30 PROCEDURE — 99999 PR PBB SHADOW E&M-EST. PATIENT-LVL IV: CPT | Mod: PBBFAC,,, | Performed by: FAMILY MEDICINE

## 2021-11-30 PROCEDURE — 90750 HZV VACC RECOMBINANT IM: CPT | Mod: S$GLB,,, | Performed by: FAMILY MEDICINE

## 2021-11-30 PROCEDURE — 90750 ZOSTER RECOMBINANT VACCINE: ICD-10-PCS | Mod: S$GLB,,, | Performed by: FAMILY MEDICINE

## 2021-11-30 PROCEDURE — 99396 PR PREVENTIVE VISIT,EST,40-64: ICD-10-PCS | Mod: 25,S$GLB,, | Performed by: FAMILY MEDICINE

## 2021-11-30 PROCEDURE — 90471 ZOSTER RECOMBINANT VACCINE: ICD-10-PCS | Mod: S$GLB,,, | Performed by: FAMILY MEDICINE

## 2021-11-30 RX ORDER — METOPROLOL TARTRATE 25 MG/1
12.5 TABLET, FILM COATED ORAL 2 TIMES DAILY
Qty: 90 TABLET | Refills: 0 | Status: SHIPPED | OUTPATIENT
Start: 2021-11-30 | End: 2022-02-22

## 2021-11-30 RX ORDER — TADALAFIL 20 MG/1
20 TABLET ORAL DAILY PRN
Qty: 30 TABLET | Refills: 1 | Status: SHIPPED | OUTPATIENT
Start: 2021-11-30 | End: 2022-08-23

## 2021-11-30 RX ORDER — BUPROPION HYDROCHLORIDE 150 MG/1
150 TABLET ORAL DAILY
Qty: 30 TABLET | Refills: 11 | Status: SHIPPED | OUTPATIENT
Start: 2021-11-30 | End: 2022-08-23

## 2021-11-30 RX ORDER — SEMAGLUTIDE 1.34 MG/ML
INJECTION, SOLUTION SUBCUTANEOUS
COMMUNITY
Start: 2021-10-18 | End: 2022-01-18

## 2021-12-01 ENCOUNTER — LAB VISIT (OUTPATIENT)
Dept: LAB | Facility: HOSPITAL | Age: 61
End: 2021-12-01
Attending: FAMILY MEDICINE
Payer: COMMERCIAL

## 2021-12-01 DIAGNOSIS — I10 ESSENTIAL HYPERTENSION, BENIGN: Chronic | ICD-10-CM

## 2021-12-01 DIAGNOSIS — Z00.00 ROUTINE PHYSICAL EXAMINATION: ICD-10-CM

## 2021-12-01 LAB
ALBUMIN SERPL BCP-MCNC: 3.9 G/DL (ref 3.5–5.2)
ALP SERPL-CCNC: 75 U/L (ref 55–135)
ALT SERPL W/O P-5'-P-CCNC: 28 U/L (ref 10–44)
ANION GAP SERPL CALC-SCNC: 12 MMOL/L (ref 8–16)
AST SERPL-CCNC: 22 U/L (ref 10–40)
BASOPHILS # BLD AUTO: 0.05 K/UL (ref 0–0.2)
BASOPHILS NFR BLD: 0.6 % (ref 0–1.9)
BILIRUB SERPL-MCNC: 0.9 MG/DL (ref 0.1–1)
BUN SERPL-MCNC: 8 MG/DL (ref 6–20)
CALCIUM SERPL-MCNC: 9.9 MG/DL (ref 8.7–10.5)
CHLORIDE SERPL-SCNC: 106 MMOL/L (ref 95–110)
CHOLEST SERPL-MCNC: 121 MG/DL (ref 120–199)
CHOLEST/HDLC SERPL: 2.9 {RATIO} (ref 2–5)
CO2 SERPL-SCNC: 20 MMOL/L (ref 23–29)
COMPLEXED PSA SERPL-MCNC: 0.52 NG/ML (ref 0–4)
CREAT SERPL-MCNC: 0.9 MG/DL (ref 0.5–1.4)
DIFFERENTIAL METHOD: NORMAL
EOSINOPHIL # BLD AUTO: 0.1 K/UL (ref 0–0.5)
EOSINOPHIL NFR BLD: 1.6 % (ref 0–8)
ERYTHROCYTE [DISTWIDTH] IN BLOOD BY AUTOMATED COUNT: 13.3 % (ref 11.5–14.5)
EST. GFR  (AFRICAN AMERICAN): >60 ML/MIN/1.73 M^2
EST. GFR  (NON AFRICAN AMERICAN): >60 ML/MIN/1.73 M^2
ESTIMATED AVG GLUCOSE: 183 MG/DL (ref 68–131)
GLUCOSE SERPL-MCNC: 158 MG/DL (ref 70–110)
HBA1C MFR BLD: 8 % (ref 4–5.6)
HCT VFR BLD AUTO: 49.9 % (ref 40–54)
HDLC SERPL-MCNC: 42 MG/DL (ref 40–75)
HDLC SERPL: 34.7 % (ref 20–50)
HGB BLD-MCNC: 16.7 G/DL (ref 14–18)
IMM GRANULOCYTES # BLD AUTO: 0.03 K/UL (ref 0–0.04)
IMM GRANULOCYTES NFR BLD AUTO: 0.3 % (ref 0–0.5)
LDLC SERPL CALC-MCNC: 58.8 MG/DL (ref 63–159)
LYMPHOCYTES # BLD AUTO: 2.3 K/UL (ref 1–4.8)
LYMPHOCYTES NFR BLD: 26.4 % (ref 18–48)
MCH RBC QN AUTO: 30.8 PG (ref 27–31)
MCHC RBC AUTO-ENTMCNC: 33.5 G/DL (ref 32–36)
MCV RBC AUTO: 92 FL (ref 82–98)
MONOCYTES # BLD AUTO: 0.7 K/UL (ref 0.3–1)
MONOCYTES NFR BLD: 7.6 % (ref 4–15)
NEUTROPHILS # BLD AUTO: 5.5 K/UL (ref 1.8–7.7)
NEUTROPHILS NFR BLD: 63.5 % (ref 38–73)
NONHDLC SERPL-MCNC: 79 MG/DL
NRBC BLD-RTO: 0 /100 WBC
PLATELET # BLD AUTO: 195 K/UL (ref 150–450)
PMV BLD AUTO: 10.3 FL (ref 9.2–12.9)
POTASSIUM SERPL-SCNC: 4.5 MMOL/L (ref 3.5–5.1)
PROT SERPL-MCNC: 7.1 G/DL (ref 6–8.4)
RBC # BLD AUTO: 5.43 M/UL (ref 4.6–6.2)
SODIUM SERPL-SCNC: 138 MMOL/L (ref 136–145)
T4 FREE SERPL-MCNC: 0.76 NG/DL (ref 0.71–1.51)
TRIGL SERPL-MCNC: 101 MG/DL (ref 30–150)
TSH SERPL DL<=0.005 MIU/L-ACNC: 1.51 UIU/ML (ref 0.4–4)
WBC # BLD AUTO: 8.71 K/UL (ref 3.9–12.7)

## 2021-12-01 PROCEDURE — 84439 ASSAY OF FREE THYROXINE: CPT | Performed by: FAMILY MEDICINE

## 2021-12-01 PROCEDURE — 84443 ASSAY THYROID STIM HORMONE: CPT | Performed by: FAMILY MEDICINE

## 2021-12-01 PROCEDURE — 84153 ASSAY OF PSA TOTAL: CPT | Performed by: FAMILY MEDICINE

## 2021-12-01 PROCEDURE — 85025 COMPLETE CBC W/AUTO DIFF WBC: CPT | Performed by: FAMILY MEDICINE

## 2021-12-01 PROCEDURE — 83036 HEMOGLOBIN GLYCOSYLATED A1C: CPT | Performed by: FAMILY MEDICINE

## 2021-12-01 PROCEDURE — 80061 LIPID PANEL: CPT | Performed by: FAMILY MEDICINE

## 2021-12-01 PROCEDURE — 36415 COLL VENOUS BLD VENIPUNCTURE: CPT | Mod: PO | Performed by: FAMILY MEDICINE

## 2021-12-01 PROCEDURE — 80053 COMPREHEN METABOLIC PANEL: CPT | Performed by: FAMILY MEDICINE

## 2021-12-02 ENCOUNTER — TELEPHONE (OUTPATIENT)
Dept: FAMILY MEDICINE | Facility: CLINIC | Age: 61
End: 2021-12-02
Payer: COMMERCIAL

## 2021-12-06 ENCOUNTER — TELEPHONE (OUTPATIENT)
Dept: FAMILY MEDICINE | Facility: CLINIC | Age: 61
End: 2021-12-06
Payer: COMMERCIAL

## 2021-12-06 DIAGNOSIS — Z79.4 TYPE 2 DIABETES MELLITUS WITH MILD NONPROLIFERATIVE RETINOPATHY, WITH LONG-TERM CURRENT USE OF INSULIN, MACULAR EDEMA PRESENCE UNSPECIFIED, UNSPECIFIED LATERALITY: Chronic | ICD-10-CM

## 2021-12-06 DIAGNOSIS — E11.3299 TYPE 2 DIABETES MELLITUS WITH MILD NONPROLIFERATIVE RETINOPATHY, WITH LONG-TERM CURRENT USE OF INSULIN, MACULAR EDEMA PRESENCE UNSPECIFIED, UNSPECIFIED LATERALITY: Chronic | ICD-10-CM

## 2021-12-06 RX ORDER — INSULIN DEGLUDEC 100 U/ML
30 INJECTION, SOLUTION SUBCUTANEOUS NIGHTLY
Qty: 15 PEN | Refills: 0
Start: 2021-12-06 | End: 2022-02-18 | Stop reason: SDUPTHER

## 2021-12-15 RX ORDER — LISINOPRIL 5 MG/1
5 TABLET ORAL DAILY
Qty: 90 TABLET | Refills: 3 | Status: SHIPPED | OUTPATIENT
Start: 2021-12-15 | End: 2022-12-21

## 2021-12-26 ENCOUNTER — PATIENT MESSAGE (OUTPATIENT)
Dept: FAMILY MEDICINE | Facility: CLINIC | Age: 61
End: 2021-12-26
Payer: COMMERCIAL

## 2021-12-27 NOTE — TELEPHONE ENCOUNTER
No new care gaps identified.  Powered by [employer]. Reference number: [id number].   12/27/2021 11:23:16 AM CST

## 2021-12-31 NOTE — TELEPHONE ENCOUNTER
Refill Routing Note   Medication(s) are not appropriate for processing by [facility] for the following reason(s):      - Drug-Disease Interaction (Uncontrolled type 2 diabetes mellitus with stage 3 chronic kidney disease, without long-term current use of insulin)    [facility] action(s):  Defer Medication-related problems identified: Drug-disease interaction        --->Care Gap information included in message below if applicable.   Medication reconciliation completed: No   Automatic Epic Generated Protocol Data:        Requested Prescriptions   Pending Prescriptions Disp Refills    metFORMIN (GLUCOPHAGE) 1000 MG tablet 180 tablet 0     Sig: Take 1 tablet (1,000 mg total) by mouth 2 (two) times daily with meals.       Endocrinology:  Diabetes - Biguanides Passed - 12/27/2021 11:22 AM        Passed - Patient is at least 18 years old        Passed - Office visit in past 12 months.     Recent Visits  Date Type Provider Dept   11/30/21 Office Visit Masood Khan MD [facility] Family Med/ Internal Med/ Peds   09/16/20 Office Visit Masood Khan MD [facility] Family Med/ Internal Med/ Peds   Showing recent visits within past 720 days and meeting all other requirements  Future Appointments  No visits were found meeting these conditions.  Showing future appointments within next 150 days and meeting all other requirements      Future Appointments              In 2 months NURSE, [facility] FAM MED/[facility]                Passed - Cr is 1.4 or below and within 360 days     Lab Results   Component Value Date    CREATININE 0.9 12/01/2021    CREATININE 1.0 11/25/2020    CREATININE 1.0 09/02/2020              Passed - HBA1C is 8 or below and within 180 days     Lab Results   Component Value Date    HGBA1C 8.0 (H) 12/01/2021    HGBA1C 7.9 (H) 04/07/2021    HGBA1C 7.5 (H) 12/28/2020              Passed - eGFR is 30 or above and within 360 days     Lab Results   Component Value Date    EGFRNONAA >60.0  12/01/2021    EGFRNONAA >60.0 11/25/2020    EGFRNONAA >60.0 09/02/2020                      Appointments  past 12m or future 3m with PCP    Date Provider   Last Visit   11/30/2021 Masood Khan MD   Next Visit   Visit date not found Masood Khan MD   ED visits in past 90 days: 0        Note composed:5:52 PM 12/31/2021

## 2022-01-03 RX ORDER — METFORMIN HYDROCHLORIDE 1000 MG/1
1000 TABLET ORAL 2 TIMES DAILY WITH MEALS
Qty: 180 TABLET | Refills: 3 | Status: SHIPPED | OUTPATIENT
Start: 2022-01-03 | End: 2022-02-18 | Stop reason: SDUPTHER

## 2022-01-05 DIAGNOSIS — Z95.1 HX OF CABG: ICD-10-CM

## 2022-01-05 NOTE — TELEPHONE ENCOUNTER
No new care gaps identified.  Powered by [email or web address]. Reference number: [id number].   1/05/2022 9:32:47 AM CST

## 2022-01-06 RX ORDER — ATORVASTATIN CALCIUM 40 MG/1
TABLET, FILM COATED ORAL
Qty: 90 TABLET | Refills: 3 | Status: SHIPPED | OUTPATIENT
Start: 2022-01-06 | End: 2022-04-13 | Stop reason: SDUPTHER

## 2022-01-06 NOTE — TELEPHONE ENCOUNTER
Refill Authorization Note   Stanley Faustin  is requesting a refill authorization.  Brief Assessment and Rationale for Refill:  Approve     Medication Therapy Plan:       Medication Reconciliation Completed: No   Comments:   --->Care Gap information included below if applicable.   Orders Placed This Encounter    atorvastatin (LIPITOR) 40 MG tablet      Requested Prescriptions   Signed Prescriptions Disp Refills    atorvastatin (LIPITOR) 40 MG tablet 90 tablet 3     Sig: TAKE 1 TABLET BY MOUTH EVERY DAY       Cardiovascular:  Antilipid - Statins Passed - 1/6/2022  8:53 AM        Passed - Patient is at least 18 years old        Passed - Valid encounter within last 15 months     Recent Visits  Date Type Provider Dept   11/30/21 Office Visit Masood Khan MD [facility] Family Med/ Internal Med/ Peds   09/16/20 Office Visit Masood Khan MD [facility] Family Med/ Internal Med/ Peds   Showing recent visits within past 720 days and meeting all other requirements  Future Appointments  No visits were found meeting these conditions.  Showing future appointments within next 150 days and meeting all other requirements      Future Appointments              In 1 month NURSE, [facility] FAM MED/INT MED [place] [facility]                Passed - ALT is 131 or below and within 360 days     ALT   Date Value Ref Range Status   12/01/2021 28 10 - 44 U/L Final   11/25/2020 40 10 - 44 U/L Final   09/02/2020 38 10 - 44 U/L Final              Passed - AST is 119 or below and within 360 days     AST   Date Value Ref Range Status   12/01/2021 22 10 - 40 U/L Final   11/25/2020 30 10 - 40 U/L Final   09/02/2020 23 10 - 40 U/L Final              Passed - Total Cholesterol within 360 days     Lab Results   Component Value Date    CHOL 121 12/01/2021    CHOL 141 09/02/2020    CHOL 155 12/02/2019              Passed - LDL within 360 days     LDL Calculated   Date Value Ref Range Status   08/05/2019 42 0 - 160 MG/DL Final     LDL Cholesterol    Date Value Ref Range Status   12/01/2021 58.8 (L) 63.0 - 159.0 mg/dL Final     Comment:     The National Cholesterol Education Program (NCEP) has set the  following guidelines (reference values) for LDL Cholesterol:  Optimal.......................<130 mg/dL  Borderline High...............130-159 mg/dL  High..........................160-189 mg/dL  Very High.....................>190 mg/dL              Passed - HDL within 360 days     HDL   Date Value Ref Range Status   12/01/2021 42 40 - 75 mg/dL Final     Comment:     The National Cholesterol Education Program (NCEP) has set the  following guidelines (reference values) for HDL Cholesterol:  Low...............<40 mg/dL  Optimal...........>60 mg/dL     08/05/2019 46 mg/dL Final            Passed - Triglycerides within 360 days     Lab Results   Component Value Date    TRIG 101 12/01/2021    TRIG 157 (H) 09/02/2020    TRIG 143 12/02/2019                  Appointments  past 12m or future 3m with PCP    Date Provider   Last Visit   11/30/2021 Masood Khan MD   Next Visit   Visit date not found Masood Khan MD   ED visits in past 90 days: 0     Note composed:8:56 AM 01/06/2022

## 2022-01-13 NOTE — TELEPHONE ENCOUNTER
No new care gaps identified.  Powered by [employer]. Reference number: [id number].   1/13/2022 12:32:44 AM CST

## 2022-01-18 RX ORDER — SEMAGLUTIDE 1.34 MG/ML
INJECTION, SOLUTION SUBCUTANEOUS
Qty: 3 PEN | Refills: 1 | Status: SHIPPED | OUTPATIENT
Start: 2022-01-18 | End: 2022-01-28

## 2022-01-18 NOTE — TELEPHONE ENCOUNTER
Refill Routing Note   Medication(s) are not appropriate for processing by [facility] for the following reason(s):      - Medication requested has undergone a recent dosage adjustment (<3 months)    [facility] action(s):  Defer Medication-related problems identified: Dose adjustment     Medication Therapy Plan: HISTORICALLY PRESCRIBED; RECENT DOSE ADJUSTMENT ON 10/18/21;Duplicate Medication/Therapy: OZEMPIC; WILL PEND FOR OZEMPIC 1 MG/DOSE (4MG/3) IF APPROVED WILL DC OZEMPIC  1MG/DOSE (2M/1.5 MG)  --->Care Gap information included in message below if applicable.   Medication reconciliation completed: No   Automatic Epic Generated Protocol Data:        Requested Prescriptions   Pending Prescriptions Disp Refills    OZEMPIC 1 mg/dose (4 mg/3 mL) [Pharmacy Med Name: OZEMPIC 1 MG/DOSE (4 MG/3 ML)] 3 pen 1     Sig: INJECT 1 MG UNDER THE SKIN EVERY 7 DAYS       Endocrinology:  Diabetes - GLP-1 Receptor Agonists Passed - 1/13/2022 12:32 AM        Passed - Patient is at least 18 years old        Passed - Valid encounter within last 15 months     Recent Visits  Date Type Provider Dept   11/30/21 Office Visit Masood Khan MD [facility] Family Med/ Internal Med/ Peds   09/16/20 Office Visit Masood Khan MD [facility] Family Med/ Internal Med/ Peds   Showing recent visits within past 720 days and meeting all other requirements  Future Appointments  No visits were found meeting these conditions.  Showing future appointments within next 150 days and meeting all other requirements      Future Appointments              In 1 month NURSE, [facility] FAM MED/INT MED [place] [facility]                Passed - HBA1C within 180 days     Lab Results   Component Value Date    HGBA1C 8.0 (H) 12/01/2021    HGBA1C 7.9 (H) 04/07/2021    HGBA1C 7.5 (H) 12/28/2020                    Appointments  past 12m or future 3m with PCP    Date Provider   Last Visit   11/30/2021 Masood Khan MD   Next Visit   Visit date not found Masood MARTIN  MD Erin   ED visits in past 90 days: 0        Note composed:1:50 PM 01/18/2022

## 2022-01-27 NOTE — TELEPHONE ENCOUNTER
This Rx Request does not qualify for assessment with the [facility].    Please review protocol details and the Care Due Message for extra clinical information    Reasons Rx Request may be deferred:  Labs/Vitals overdue  Labs/Vitals abnormal  Patient has been seen in the ED/Hospital since the last PCP visit  Medication is non-delegated  Provider is a non-participating provider      Changes Requested In Note From Pharmacy    To modify prescription details, click Edit Rx.  To prescribe a different medication, open the encounter and click Replace.  Open Encounter  To direct the pharmacy to dispense the original prescription, refuse this request.      Changes Requested     Name from pharmacy: OZEMPIC 1 MG/DOSE (4 MG/3 ML)         Will file in chart as: OZEMPIC 1 mg/dose (4 mg/3 mL)    Sig: INJECT 1 MG UNDER THE SKIN EVERY 7 DAYS    Disp:  Not specified (Pharmacy requested: 3 each)    Refills:  1    Start: 1/27/2022    Class: Normal    Non-formulary    Last ordered: 1 week ago by Masood Khan MD Last refill: 1/18/2022    Rx #: [id number]    Pharmacy comment: Script Clarification:PAT WOULD LIKE A 90DS.    Endocrinology:  Diabetes - GLP-1 Receptor Agonists Passed

## 2022-01-27 NOTE — TELEPHONE ENCOUNTER
No new care gaps identified.  Powered by [employer]. Reference number: [id number].   1/27/2022 2:47:36 PM CST

## 2022-01-28 RX ORDER — SEMAGLUTIDE 1.34 MG/ML
INJECTION, SOLUTION SUBCUTANEOUS
Qty: 12 PEN | Refills: 0 | Status: SHIPPED | OUTPATIENT
Start: 2022-01-28 | End: 2022-02-18 | Stop reason: SDUPTHER

## 2022-02-03 LAB
LEFT EYE DM RETINOPATHY: NEGATIVE
RIGHT EYE DM RETINOPATHY: NEGATIVE

## 2022-02-17 ENCOUNTER — TELEPHONE (OUTPATIENT)
Dept: FAMILY MEDICINE | Facility: CLINIC | Age: 62
End: 2022-02-17
Payer: COMMERCIAL

## 2022-02-17 DIAGNOSIS — E11.22 TYPE 2 DIABETES MELLITUS WITH STAGE 3A CHRONIC KIDNEY DISEASE, WITHOUT LONG-TERM CURRENT USE OF INSULIN: Primary | ICD-10-CM

## 2022-02-17 DIAGNOSIS — N18.31 TYPE 2 DIABETES MELLITUS WITH STAGE 3A CHRONIC KIDNEY DISEASE, WITHOUT LONG-TERM CURRENT USE OF INSULIN: Primary | ICD-10-CM

## 2022-02-18 ENCOUNTER — LAB VISIT (OUTPATIENT)
Dept: LAB | Facility: HOSPITAL | Age: 62
End: 2022-02-18
Attending: FAMILY MEDICINE
Payer: COMMERCIAL

## 2022-02-18 ENCOUNTER — TELEPHONE (OUTPATIENT)
Dept: FAMILY MEDICINE | Facility: CLINIC | Age: 62
End: 2022-02-18
Payer: COMMERCIAL

## 2022-02-18 DIAGNOSIS — E11.22 TYPE 2 DIABETES MELLITUS WITH STAGE 3A CHRONIC KIDNEY DISEASE, WITHOUT LONG-TERM CURRENT USE OF INSULIN: ICD-10-CM

## 2022-02-18 DIAGNOSIS — E11.3299 TYPE 2 DIABETES MELLITUS WITH MILD NONPROLIFERATIVE RETINOPATHY, WITH LONG-TERM CURRENT USE OF INSULIN, MACULAR EDEMA PRESENCE UNSPECIFIED, UNSPECIFIED LATERALITY: Chronic | ICD-10-CM

## 2022-02-18 DIAGNOSIS — Z79.4 TYPE 2 DIABETES MELLITUS WITH MILD NONPROLIFERATIVE RETINOPATHY, WITH LONG-TERM CURRENT USE OF INSULIN, MACULAR EDEMA PRESENCE UNSPECIFIED, UNSPECIFIED LATERALITY: Chronic | ICD-10-CM

## 2022-02-18 DIAGNOSIS — N18.31 TYPE 2 DIABETES MELLITUS WITH STAGE 3A CHRONIC KIDNEY DISEASE, WITHOUT LONG-TERM CURRENT USE OF INSULIN: ICD-10-CM

## 2022-02-18 LAB
ESTIMATED AVG GLUCOSE: 206 MG/DL (ref 68–131)
HBA1C MFR BLD: 8.8 % (ref 4–5.6)

## 2022-02-18 PROCEDURE — 36415 COLL VENOUS BLD VENIPUNCTURE: CPT | Mod: PO | Performed by: FAMILY MEDICINE

## 2022-02-18 PROCEDURE — 83036 HEMOGLOBIN GLYCOSYLATED A1C: CPT | Performed by: FAMILY MEDICINE

## 2022-02-18 RX ORDER — INSULIN DEGLUDEC 100 U/ML
35 INJECTION, SOLUTION SUBCUTANEOUS NIGHTLY
Qty: 15 PEN | Refills: 0
Start: 2022-02-18 | End: 2022-09-19

## 2022-02-18 RX ORDER — METFORMIN HYDROCHLORIDE 1000 MG/1
1000 TABLET ORAL 2 TIMES DAILY WITH MEALS
Qty: 180 TABLET | Refills: 3 | Status: SHIPPED | OUTPATIENT
Start: 2022-02-18 | End: 2023-01-27

## 2022-02-18 RX ORDER — SEMAGLUTIDE 1.34 MG/ML
INJECTION, SOLUTION SUBCUTANEOUS
Qty: 12 PEN | Refills: 3 | Status: SHIPPED | OUTPATIENT
Start: 2022-02-18 | End: 2022-04-21

## 2022-02-18 NOTE — TELEPHONE ENCOUNTER
Contacted pt, verbalized good understanding. Pt says that his insurance states that he needs a 90 day supply. Pt says all his medication needs to be a 90 day supply per insurance company. Pt says that sometimes he is going two weeks without medication.

## 2022-02-18 NOTE — TELEPHONE ENCOUNTER
No new care gaps identified.  Powered by [employer]. Reference number: [id number].   2/18/2022 3:34:45 PM CST

## 2022-02-18 NOTE — TELEPHONE ENCOUNTER
Diabetes terrible, increasing! Increase tresiba to 35 units at night.     Is he taking his meds as prescribed??

## 2022-02-21 DIAGNOSIS — I10 ESSENTIAL HYPERTENSION, BENIGN: Chronic | ICD-10-CM

## 2022-02-21 NOTE — TELEPHONE ENCOUNTER
No new care gaps identified.  Powered by [employer]. Reference number: [id number].   2/21/2022 7:31:39 AM CST

## 2022-02-22 RX ORDER — METOPROLOL TARTRATE 25 MG/1
12.5 TABLET, FILM COATED ORAL 2 TIMES DAILY
Qty: 90 TABLET | Refills: 3 | Status: SHIPPED | OUTPATIENT
Start: 2022-02-22 | End: 2023-02-15

## 2022-02-22 NOTE — TELEPHONE ENCOUNTER
Refill Authorization Note   Stanley Faustin  is requesting a refill authorization.  Brief Assessment and Rationale for Refill:  Approve     Medication Therapy Plan:       Medication Reconciliation Completed: No   Comments:   --->Care Gap information included below if applicable.   Orders Placed This Encounter    metoprolol tartrate (LOPRESSOR) 25 MG tablet      Requested Prescriptions   Signed Prescriptions Disp Refills    metoprolol tartrate (LOPRESSOR) 25 MG tablet 90 tablet 3     Sig: Take 0.5 tablets (12.5 mg total) by mouth 2 (two) times daily.       Cardiovascular:  Beta Blockers Passed - 2/21/2022  7:31 AM        Passed - Patient is at least 18 years old        Passed - Last BP in normal range within 360 days     BP Readings from Last 1 Encounters:   11/30/21 116/68               Passed - Last Heart Rate in normal range within 360 days     Pulse Readings from Last 1 Encounters:   11/30/21 106              Passed - Valid encounter within last 15 months     Recent Visits  Date Type Provider Dept   11/30/21 Office Visit Masood Khan MD [facility] Family [facility]/ Internal Med/ Peds   09/16/20 Office Visit Masood Khan MD [facility] Family [facility]/ Internal Med/ Peds   Showing recent visits within past 720 days and meeting all other requirements  Future Appointments  No visits were found meeting these conditions.  Showing future appointments within next 150 days and meeting all other requirements      Future Appointments              In 1 week NURSE, Merged with [facility] FAM [facility] [place] [facility], [place]                    Appointments  past 12m or future 3m with PCP    Date Provider   Last Visit   11/30/2021 Masood Khan MD   Next Visit   Visit date not found Masood Khan MD   ED visits in past 90 days: 0     Note composed:2:17 PM 02/22/2022

## 2022-03-02 ENCOUNTER — CLINICAL SUPPORT (OUTPATIENT)
Dept: FAMILY MEDICINE | Facility: CLINIC | Age: 62
End: 2022-03-02
Payer: COMMERCIAL

## 2022-03-02 VITALS — TEMPERATURE: 98 F

## 2022-03-02 DIAGNOSIS — Z23 NEED FOR SHINGLES VACCINE: Primary | ICD-10-CM

## 2022-03-02 PROCEDURE — 90471 IMMUNIZATION ADMIN: CPT | Mod: S$GLB,,, | Performed by: FAMILY MEDICINE

## 2022-03-02 PROCEDURE — 99999 PR PBB SHADOW E&M-EST. PATIENT-LVL I: CPT | Mod: PBBFAC,,,

## 2022-03-02 PROCEDURE — 90471 ZOSTER RECOMBINANT VACCINE: ICD-10-PCS | Mod: S$GLB,,, | Performed by: FAMILY MEDICINE

## 2022-03-02 PROCEDURE — 90750 ZOSTER RECOMBINANT VACCINE: ICD-10-PCS | Mod: S$GLB,,, | Performed by: FAMILY MEDICINE

## 2022-03-02 PROCEDURE — 90750 HZV VACC RECOMBINANT IM: CPT | Mod: S$GLB,,, | Performed by: FAMILY MEDICINE

## 2022-03-02 PROCEDURE — 99999 PR PBB SHADOW E&M-EST. PATIENT-LVL I: ICD-10-PCS | Mod: PBBFAC,,,

## 2022-03-10 ENCOUNTER — HOSPITAL ENCOUNTER (INPATIENT)
Facility: HOSPITAL | Age: 62
LOS: 1 days | Discharge: HOME | DRG: 390 | End: 2022-03-11
Attending: EMERGENCY MEDICINE | Admitting: EMERGENCY MEDICINE
Payer: COMMERCIAL

## 2022-03-10 DIAGNOSIS — K56.609 SMALL BOWEL OBSTRUCTION: Primary | ICD-10-CM

## 2022-03-10 LAB
ALBUMIN SERPL BCP-MCNC: 3.8 G/DL (ref 3.5–5.2)
ALP SERPL-CCNC: 72 U/L (ref 55–135)
ALT SERPL W/O P-5'-P-CCNC: 21 U/L (ref 10–44)
ANION GAP SERPL CALC-SCNC: 12 MMOL/L (ref 8–16)
AST SERPL-CCNC: 19 U/L (ref 10–40)
BACTERIA #/AREA URNS HPF: NORMAL /HPF
BASOPHILS # BLD AUTO: 0.07 K/UL (ref 0–0.2)
BASOPHILS NFR BLD: 0.7 % (ref 0–1.9)
BILIRUB SERPL-MCNC: 0.9 MG/DL (ref 0.1–1)
BILIRUB UR QL STRIP: NEGATIVE
BUN SERPL-MCNC: 17 MG/DL (ref 6–20)
CALCIUM SERPL-MCNC: 9.2 MG/DL (ref 8.7–10.5)
CHLORIDE SERPL-SCNC: 104 MMOL/L (ref 95–110)
CLARITY UR: CLEAR
CO2 SERPL-SCNC: 22 MMOL/L (ref 23–29)
COLOR UR: YELLOW
CREAT SERPL-MCNC: 1.2 MG/DL (ref 0.5–1.4)
CTP QC/QA: YES
DIFFERENTIAL METHOD: NORMAL
EOSINOPHIL # BLD AUTO: 0.2 K/UL (ref 0–0.5)
EOSINOPHIL NFR BLD: 1.6 % (ref 0–8)
ERYTHROCYTE [DISTWIDTH] IN BLOOD BY AUTOMATED COUNT: 13.1 % (ref 11.5–14.5)
EST. GFR  (AFRICAN AMERICAN): >60 ML/MIN/1.73 M^2
EST. GFR  (NON AFRICAN AMERICAN): >60 ML/MIN/1.73 M^2
GLUCOSE SERPL-MCNC: 181 MG/DL (ref 70–110)
GLUCOSE UR QL STRIP: ABNORMAL
HCT VFR BLD AUTO: 47 % (ref 40–54)
HGB BLD-MCNC: 16.2 G/DL (ref 14–18)
HGB UR QL STRIP: NEGATIVE
IMM GRANULOCYTES # BLD AUTO: 0.03 K/UL (ref 0–0.04)
IMM GRANULOCYTES NFR BLD AUTO: 0.3 % (ref 0–0.5)
KETONES UR QL STRIP: NEGATIVE
LEUKOCYTE ESTERASE UR QL STRIP: NEGATIVE
LIPASE SERPL-CCNC: 44 U/L (ref 4–60)
LYMPHOCYTES # BLD AUTO: 2.2 K/UL (ref 1–4.8)
LYMPHOCYTES NFR BLD: 22.1 % (ref 18–48)
MCH RBC QN AUTO: 30.6 PG (ref 27–31)
MCHC RBC AUTO-ENTMCNC: 34.5 G/DL (ref 32–36)
MCV RBC AUTO: 89 FL (ref 82–98)
MICROSCOPIC COMMENT: NORMAL
MONOCYTES # BLD AUTO: 0.6 K/UL (ref 0.3–1)
MONOCYTES NFR BLD: 6.1 % (ref 4–15)
NEUTROPHILS # BLD AUTO: 6.8 K/UL (ref 1.8–7.7)
NEUTROPHILS NFR BLD: 69.2 % (ref 38–73)
NITRITE UR QL STRIP: NEGATIVE
NRBC BLD-RTO: 0 /100 WBC
PH UR STRIP: 6 [PH] (ref 5–8)
PLATELET # BLD AUTO: 230 K/UL (ref 150–450)
PMV BLD AUTO: 9.6 FL (ref 9.2–12.9)
POCT GLUCOSE: 136 MG/DL (ref 70–110)
POCT GLUCOSE: 96 MG/DL (ref 70–110)
POTASSIUM SERPL-SCNC: 4.4 MMOL/L (ref 3.5–5.1)
PROT SERPL-MCNC: 7.1 G/DL (ref 6–8.4)
PROT UR QL STRIP: NEGATIVE
RBC # BLD AUTO: 5.29 M/UL (ref 4.6–6.2)
RBC #/AREA URNS HPF: 2 /HPF (ref 0–4)
SARS-COV-2 RDRP RESP QL NAA+PROBE: NEGATIVE
SODIUM SERPL-SCNC: 138 MMOL/L (ref 136–145)
SP GR UR STRIP: >1.03 (ref 1–1.03)
URN SPEC COLLECT METH UR: ABNORMAL
UROBILINOGEN UR STRIP-ACNC: NEGATIVE EU/DL
WBC # BLD AUTO: 9.77 K/UL (ref 3.9–12.7)
YEAST URNS QL MICRO: NORMAL

## 2022-03-10 PROCEDURE — 25500020 PHARM REV CODE 255: Performed by: SURGERY

## 2022-03-10 PROCEDURE — 63600175 PHARM REV CODE 636 W HCPCS: Performed by: EMERGENCY MEDICINE

## 2022-03-10 PROCEDURE — 25000003 PHARM REV CODE 250: Performed by: PHYSICIAN ASSISTANT

## 2022-03-10 PROCEDURE — 81000 URINALYSIS NONAUTO W/SCOPE: CPT | Performed by: EMERGENCY MEDICINE

## 2022-03-10 PROCEDURE — 96375 TX/PRO/DX INJ NEW DRUG ADDON: CPT

## 2022-03-10 PROCEDURE — 11000001 HC ACUTE MED/SURG PRIVATE ROOM

## 2022-03-10 PROCEDURE — 63600175 PHARM REV CODE 636 W HCPCS: Performed by: PHYSICIAN ASSISTANT

## 2022-03-10 PROCEDURE — 80053 COMPREHEN METABOLIC PANEL: CPT | Performed by: PHYSICIAN ASSISTANT

## 2022-03-10 PROCEDURE — 83690 ASSAY OF LIPASE: CPT | Performed by: PHYSICIAN ASSISTANT

## 2022-03-10 PROCEDURE — U0002 COVID-19 LAB TEST NON-CDC: HCPCS | Performed by: PHYSICIAN ASSISTANT

## 2022-03-10 PROCEDURE — 99285 EMERGENCY DEPT VISIT HI MDM: CPT | Mod: 25

## 2022-03-10 PROCEDURE — 82962 GLUCOSE BLOOD TEST: CPT

## 2022-03-10 PROCEDURE — 96374 THER/PROPH/DIAG INJ IV PUSH: CPT

## 2022-03-10 PROCEDURE — 25500020 PHARM REV CODE 255: Performed by: EMERGENCY MEDICINE

## 2022-03-10 PROCEDURE — 85025 COMPLETE CBC W/AUTO DIFF WBC: CPT | Performed by: PHYSICIAN ASSISTANT

## 2022-03-10 RX ORDER — MORPHINE SULFATE 4 MG/ML
4 INJECTION, SOLUTION INTRAMUSCULAR; INTRAVENOUS
Status: COMPLETED | OUTPATIENT
Start: 2022-03-10 | End: 2022-03-10

## 2022-03-10 RX ORDER — TALC
6 POWDER (GRAM) TOPICAL NIGHTLY PRN
Status: DISCONTINUED | OUTPATIENT
Start: 2022-03-10 | End: 2022-03-11 | Stop reason: HOSPADM

## 2022-03-10 RX ORDER — NITROGLYCERIN 0.3 MG/1
0.3 TABLET SUBLINGUAL EVERY 5 MIN PRN
Status: DISCONTINUED | OUTPATIENT
Start: 2022-03-10 | End: 2022-03-10

## 2022-03-10 RX ORDER — ATORVASTATIN CALCIUM 40 MG/1
40 TABLET, FILM COATED ORAL DAILY
Status: DISCONTINUED | OUTPATIENT
Start: 2022-03-10 | End: 2022-03-11 | Stop reason: HOSPADM

## 2022-03-10 RX ORDER — LISINOPRIL 5 MG/1
5 TABLET ORAL DAILY
Status: DISCONTINUED | OUTPATIENT
Start: 2022-03-10 | End: 2022-03-11 | Stop reason: HOSPADM

## 2022-03-10 RX ORDER — NALOXONE HCL 0.4 MG/ML
0.4 VIAL (ML) INJECTION
Status: DISCONTINUED | OUTPATIENT
Start: 2022-03-10 | End: 2022-03-11 | Stop reason: HOSPADM

## 2022-03-10 RX ORDER — BUPROPION HYDROCHLORIDE 150 MG/1
150 TABLET ORAL DAILY
Status: DISCONTINUED | OUTPATIENT
Start: 2022-03-10 | End: 2022-03-11 | Stop reason: HOSPADM

## 2022-03-10 RX ORDER — HYDRALAZINE HYDROCHLORIDE 20 MG/ML
10 INJECTION INTRAMUSCULAR; INTRAVENOUS EVERY 8 HOURS PRN
Status: DISCONTINUED | OUTPATIENT
Start: 2022-03-10 | End: 2022-03-11 | Stop reason: HOSPADM

## 2022-03-10 RX ORDER — SODIUM CHLORIDE, SODIUM LACTATE, POTASSIUM CHLORIDE, CALCIUM CHLORIDE 600; 310; 30; 20 MG/100ML; MG/100ML; MG/100ML; MG/100ML
INJECTION, SOLUTION INTRAVENOUS CONTINUOUS
Status: DISCONTINUED | OUTPATIENT
Start: 2022-03-10 | End: 2022-03-11

## 2022-03-10 RX ORDER — ONDANSETRON 2 MG/ML
4 INJECTION INTRAMUSCULAR; INTRAVENOUS
Status: COMPLETED | OUTPATIENT
Start: 2022-03-10 | End: 2022-03-10

## 2022-03-10 RX ORDER — ENOXAPARIN SODIUM 100 MG/ML
40 INJECTION SUBCUTANEOUS EVERY 24 HOURS
Status: DISCONTINUED | OUTPATIENT
Start: 2022-03-11 | End: 2022-03-11 | Stop reason: HOSPADM

## 2022-03-10 RX ORDER — ACETAMINOPHEN 325 MG/1
650 TABLET ORAL EVERY 6 HOURS PRN
Status: DISCONTINUED | OUTPATIENT
Start: 2022-03-10 | End: 2022-03-11 | Stop reason: HOSPADM

## 2022-03-10 RX ORDER — FAMOTIDINE 10 MG/ML
20 INJECTION INTRAVENOUS EVERY 12 HOURS
Status: DISCONTINUED | OUTPATIENT
Start: 2022-03-10 | End: 2022-03-11 | Stop reason: HOSPADM

## 2022-03-10 RX ORDER — ASPIRIN 81 MG/1
81 TABLET ORAL DAILY
Status: DISCONTINUED | OUTPATIENT
Start: 2022-03-10 | End: 2022-03-10

## 2022-03-10 RX ORDER — SODIUM CHLORIDE 0.9 % (FLUSH) 0.9 %
10 SYRINGE (ML) INJECTION
Status: DISCONTINUED | OUTPATIENT
Start: 2022-03-10 | End: 2022-03-11 | Stop reason: HOSPADM

## 2022-03-10 RX ORDER — ONDANSETRON 2 MG/ML
4 INJECTION INTRAMUSCULAR; INTRAVENOUS EVERY 6 HOURS PRN
Status: DISCONTINUED | OUTPATIENT
Start: 2022-03-10 | End: 2022-03-11 | Stop reason: HOSPADM

## 2022-03-10 RX ORDER — GLUCAGON 1 MG
1 KIT INJECTION
Status: DISCONTINUED | OUTPATIENT
Start: 2022-03-10 | End: 2022-03-11 | Stop reason: HOSPADM

## 2022-03-10 RX ORDER — METOPROLOL TARTRATE 25 MG/1
12.5 TABLET ORAL 2 TIMES DAILY
Status: DISCONTINUED | OUTPATIENT
Start: 2022-03-10 | End: 2022-03-11 | Stop reason: HOSPADM

## 2022-03-10 RX ORDER — NITROGLYCERIN 0.4 MG/1
0.4 TABLET SUBLINGUAL EVERY 5 MIN PRN
Status: DISCONTINUED | OUTPATIENT
Start: 2022-03-10 | End: 2022-03-11 | Stop reason: HOSPADM

## 2022-03-10 RX ORDER — HYDROMORPHONE HYDROCHLORIDE 2 MG/ML
1 INJECTION, SOLUTION INTRAMUSCULAR; INTRAVENOUS; SUBCUTANEOUS
Status: COMPLETED | OUTPATIENT
Start: 2022-03-10 | End: 2022-03-10

## 2022-03-10 RX ORDER — MORPHINE SULFATE 4 MG/ML
4 INJECTION, SOLUTION INTRAMUSCULAR; INTRAVENOUS EVERY 4 HOURS PRN
Status: DISCONTINUED | OUTPATIENT
Start: 2022-03-10 | End: 2022-03-11 | Stop reason: HOSPADM

## 2022-03-10 RX ORDER — INSULIN ASPART 100 [IU]/ML
1-10 INJECTION, SOLUTION INTRAVENOUS; SUBCUTANEOUS EVERY 6 HOURS PRN
Status: DISCONTINUED | OUTPATIENT
Start: 2022-03-10 | End: 2022-03-11 | Stop reason: HOSPADM

## 2022-03-10 RX ADMIN — MORPHINE SULFATE 4 MG: 4 INJECTION, SOLUTION INTRAMUSCULAR; INTRAVENOUS at 04:03

## 2022-03-10 RX ADMIN — FAMOTIDINE 20 MG: 10 INJECTION INTRAVENOUS at 10:03

## 2022-03-10 RX ADMIN — SODIUM CHLORIDE, SODIUM LACTATE, POTASSIUM CHLORIDE, AND CALCIUM CHLORIDE: .6; .31; .03; .02 INJECTION, SOLUTION INTRAVENOUS at 05:03

## 2022-03-10 RX ADMIN — IOHEXOL 85 ML: 350 INJECTION, SOLUTION INTRAVENOUS at 04:03

## 2022-03-10 RX ADMIN — HYDROMORPHONE HYDROCHLORIDE 1 MG: 2 INJECTION INTRAMUSCULAR; INTRAVENOUS; SUBCUTANEOUS at 06:03

## 2022-03-10 RX ADMIN — MORPHINE SULFATE 4 MG: 4 INJECTION, SOLUTION INTRAMUSCULAR; INTRAVENOUS at 05:03

## 2022-03-10 RX ADMIN — ONDANSETRON 4 MG: 2 INJECTION INTRAMUSCULAR; INTRAVENOUS at 04:03

## 2022-03-10 RX ADMIN — SODIUM CHLORIDE, SODIUM LACTATE, POTASSIUM CHLORIDE, AND CALCIUM CHLORIDE: .6; .31; .03; .02 INJECTION, SOLUTION INTRAVENOUS at 06:03

## 2022-03-10 RX ADMIN — MORPHINE SULFATE 4 MG: 4 INJECTION, SOLUTION INTRAMUSCULAR; INTRAVENOUS at 01:03

## 2022-03-10 RX ADMIN — METOPROLOL TARTRATE 12.5 MG: 25 TABLET, FILM COATED ORAL at 08:03

## 2022-03-10 RX ADMIN — FAMOTIDINE 20 MG: 10 INJECTION INTRAVENOUS at 08:03

## 2022-03-10 RX ADMIN — DIATRIZOATE MEGLUMINE AND DIATRIZOATE SODIUM 240 ML: 660; 100 LIQUID ORAL; RECTAL at 11:03

## 2022-03-10 RX ADMIN — SODIUM CHLORIDE, SODIUM LACTATE, POTASSIUM CHLORIDE, AND CALCIUM CHLORIDE: .6; .31; .03; .02 INJECTION, SOLUTION INTRAVENOUS at 12:03

## 2022-03-10 NOTE — ED NOTES
Report received from MAYRA Mendes. Pt is lying calmly awake in bed, NAD noted. Bed locked in lowest position, call light within reach. Pt connected to cardiac monitoring, continuous pulse ox and automated BP cuff. Pt aware of admission and denies questions or needs at this time.

## 2022-03-10 NOTE — ASSESSMENT & PLAN NOTE
Holding any oral medications and long acting insulin as long as patient is NPO.  Current glucose of 136.  Continue to monitor on insulin sliding scale with glucose monitoring every 6 hours while NPO.

## 2022-03-10 NOTE — ASSESSMENT & PLAN NOTE
BP medications on hold as patient is NPO.  BP currently stable off medications.  Add prn IV Hydralazine.  Restart medications once able.

## 2022-03-10 NOTE — HPI
60 y/o male presented with abdominal pain.  ER work up showed SBO.  NGT placed and patient admitted to Surgery.  Patient with hx of CAD, HTN, HLP and DM and Hospital Medicine consulted for medical management.  Patient denies any recent illness.  States compliance with medications.  Patient is active and independent at home.  No recent complications from his chronic medical conditions.  No other complaints.

## 2022-03-10 NOTE — CONSULTS
[facility] Medicine  Consult Note    Patient Name: Stanley Faustin  MRN: [id number]  Admission Date: 3/10/2022  Hospital Length of Stay: 0 days  Attending Physician: Curly Barrera MD   Primary Care Provider: Masood Khan MD           Patient information was obtained from patient and ER records.     Consults  Subjective:     Principal Problem: SBO (small bowel obstruction)    Chief Complaint:   Chief Complaint   Patient presents with    Abdominal Pain     Mid to LLQ abd pain since 1800. Denies any N/V. Normal BM on yesterday, Denies any dysuria        HPI: 60 y/o male presented with abdominal pain.  ER work up showed SBO.  NGT placed and patient admitted to Surgery.  Patient with hx of CAD, HTN, HLP and DM and Hospital Medicine consulted for medical management.  Patient denies any recent illness.  States compliance with medications.  Patient is active and independent at home.  No recent complications from his chronic medical conditions.  No other complaints.      Past Medical History:   Diagnosis Date    Cluster headaches     Coronary artery disease     s/p stent    Diabetes mellitus     Diabetes mellitus type II     Headache     High cholesterol     Hyperlipidemia     Hypertension     Myocardial infarction        Past Surgical History:   Procedure Laterality Date    COLONOSCOPY N/A 4/1/2021    Procedure: COLONOSCOPY;  Surgeon: Bernard Leach MD;  Location: [facility] ENDO;  Service: Endoscopy;  Laterality: N/A;  covid test 3/29 [facility], current smoker, prep instr emailed, 1 visitor policy explained -ml  3/30 pt understands earlier arrival time and earlier prep time -ml    CORONARY ANGIOPLASTY WITH STENT PLACEMENT      ENDOSCOPIC NASAL SEPTOPLASTY N/A 2/11/2020    Procedure: SEPTOPLASTY, NOSE, ENDOSCOPIC;  Surgeon: Clifton Angel MD;  Location: [facility] OR;  Service: ENT;  Laterality: N/A;  DISC LOADED BY VICENTE ON 2-5-2020  RN PRE OP 2-7-2020 [place]  NEED H/P    FUNCTIONAL ENDOSCOPIC  SINUS SURGERY (FESS) USING COMPUTER-ASSISTED NAVIGATION Bilateral 6/26/2018    Procedure: SINUS SURGERY FUNCTIONAL ENDOSCOPIC WITH NAVIGATION;  Surgeon: Sina Cortez MD;  Location: [facility] OR;  Service: ENT;  Laterality: Bilateral;    FUNCTIONAL ENDOSCOPIC SINUS SURGERY (FESS) USING COMPUTER-ASSISTED NAVIGATION Bilateral 2/11/2020    Procedure: FESS, USING COMPUTER-ASSISTED NAVIGATION;  Surgeon: Clifton Angel MD;  Location: [facility] OR;  Service: ENT;  Laterality: Bilateral;    left shoulder      OR CABG, ARTERY-VEIN, FOUR  04/12/2019    Coronary Artery Bypass, 4    RECONSTRUCTION OF NOSE Bilateral 6/26/2018    Procedure: RECONSTRUCTION-NASAL;  Surgeon: Sina Cortez MD;  Location: [facility] OR;  Service: ENT;  Laterality: Bilateral;  [employer]  RN PREOP 6/20/2018    TONSILLECTOMY      TURBINATE RESECTION Bilateral 6/26/2018    Procedure: TURBINATE CAUTERY RESECTION WITH DEBRIDER (CRISTINA. MAXILLARY & CRISTINA. ETHMOID);  Surgeon: Sina Cortez MD;  Location: [facility] OR;  Service: ENT;  Laterality: Bilateral;    UVULOPALATOPHARYNGOPLASTY      for REJI       Review of patient's allergies indicates:  No Known Allergies    No current facility-administered medications on file prior to encounter.     Current Outpatient Medications on File Prior to Encounter   Medication Sig    aspirin (ECOTRIN) 81 MG EC tablet Take 81 mg by mouth once daily.    atorvastatin (LIPITOR) 40 MG tablet TAKE 1 TABLET BY MOUTH EVERY DAY    buPROPion (WELLBUTRIN XL) 150 MG TB24 tablet Take 1 tablet (150 mg total) by mouth once daily.    JARDIANCE 25 mg tablet TAKE 1 TABLET BY MOUTH EVERY DAY    lisinopriL (PRINIVIL,ZESTRIL) 5 MG tablet Take 1 tablet (5 mg total) by mouth once daily.    metFORMIN (GLUCOPHAGE) 1000 MG tablet Take 1 tablet (1,000 mg total) by mouth 2 (two) times daily with meals.    metoprolol tartrate (LOPRESSOR) 25 MG tablet Take 0.5 tablets (12.5 mg total) by mouth 2 (two) times daily.    nateglinide (STARLIX) 120 MG tablet TAKE 1  "TABLET BY MOUTH THREE TIMES DAILY BEFORE EACH MEAL    semaglutide (OZEMPIC) 1 mg/dose (4 mg/3 mL) INJECT 1 MG UNDER THE SKIN EVERY 7 DAYS    TRESIBA FLEXTOUCH U-100 100 unit/mL (3 mL) insulin pen Inject 35 Units into the skin every evening.    blood sugar diagnostic Strp 1 strip by Misc.(Non-Drug; Combo Route) route 3 (three) times daily.    blood-glucose meter kit Use as instructed    clotrimazole-betamethasone 1-0.05% (LOTRISONE) cream APPLY 1 TO 2 TIMES A DAY AS NEEDED    diclofenac sodium (VOLTAREN) 1 % Gel Apply 2 g topically.    lancets Misc 1 application by Misc.(Non-Drug; Combo Route) route 3 (three) times daily.    nitroGLYCERIN (NITROSTAT) 0.4 MG SL tablet PLACE ONE TABLET UNDER THE TONGUE EVERY 5 MINUTES AS NEEDED    pen needle, diabetic (BD ULTRA-FINE VIRGILIO PEN NEEDLE) 32 gauge x 5/32" Ndle 1 application by Misc.(Non-Drug; Combo Route) route 3 (three) times daily.    semaglutide (OZEMPIC) 1 mg/dose (2 mg/1.5 mL) PnIj INJECT 1 MG UNDER THE SKIN EVERY 7 DAYS    tadalafiL (CIALIS) 20 MG Tab Take 1 tablet (20 mg total) by mouth daily as needed.     Family History       Problem Relation (Age of Onset)    Diabetes Mother, Father    Heart disease Father    Mental illness Brother          Tobacco Use    Smoking status: Current Every Day Smoker     Packs/day: 0.75     Types: Cigarettes     Last attempt to quit: 2019     Years since quittin.9    Smokeless tobacco: Never Used    Tobacco comment:  x 34 yr.  Works at [employer].  Three kids.  Walks a lot at work.     Substance and Sexual Activity    Alcohol use: Not Currently     Comment: only on occasion    Drug use: Never    Sexual activity: Yes     Partners: Female     Review of Systems   Constitutional:  Negative for chills and fever.   HENT:  Negative for ear discharge and ear pain.    Eyes:  Negative for discharge and itching.   Respiratory:  Negative for cough and shortness of breath.    Cardiovascular:  Negative for " chest pain and palpitations.   Gastrointestinal:  Positive for abdominal pain. Negative for diarrhea.   Endocrine: Negative for cold intolerance and heat intolerance.   Genitourinary:  Negative for difficulty urinating and dysuria.   Musculoskeletal:  Negative for neck pain and neck stiffness.   Skin:  Negative for rash and wound.   Neurological:  Negative for seizures and syncope.   Psychiatric/Behavioral:  Negative for agitation and hallucinations.    Objective:     Vital Signs (Most Recent):  Temp: 97.6 °F (36.4 °C) (03/10/22 1159)  Pulse: 69 (03/10/22 1159)  Resp: 20 (03/10/22 1308)  BP: 122/73 (03/10/22 1159)  SpO2: 97 % (03/10/22 1159) Vital Signs (24h Range):  Temp:  [97.6 °F (36.4 °C)-98.1 °F (36.7 °C)] 97.6 °F (36.4 °C)  Pulse:  [69-90] 69  Resp:  [17-20] 20  SpO2:  [96 %-100 %] 97 %  BP: (100-122)/(63-76) 122/73     Weight: 88 kg (194 lb 0.1 oz)  Body mass index is 28.65 kg/m².    Physical Exam  Constitutional:       General: He is not in acute distress.     Appearance: He is not diaphoretic.   HENT:      Head: Normocephalic and atraumatic.      Nose:      Comments: NGT in place  Eyes:      General:         Right eye: No discharge.         Left eye: No discharge.      Conjunctiva/sclera: Conjunctivae normal.   Cardiovascular:      Rate and Rhythm: Normal rate and regular rhythm.   Pulmonary:      Effort: No respiratory distress.      Breath sounds: Normal breath sounds.   Abdominal:      General: There is distension.      Palpations: Abdomen is soft.      Tenderness: There is no abdominal tenderness.   Musculoskeletal:         General: No deformity or signs of injury.   Skin:     General: Skin is warm and dry.   Neurological:      Mental Status: He is oriented to person, place, and time.      Cranial Nerves: No cranial nerve deficit.       Significant Labs: All pertinent labs within the past 24 hours have been reviewed.  BMP:   Recent Labs   Lab 03/10/22  0410   *      K 4.4      CO2 22*    BUN 17   CREATININE 1.2   CALCIUM 9.2     CBC:   Recent Labs   Lab 03/10/22  0410   WBC 9.77   HGB 16.2   HCT 47.0          Significant Imaging: I have reviewed all pertinent imaging results/findings within the past 24 hours.    Assessment/Plan:     * SBO (small bowel obstruction)  NGT placed.  Treatment and plan per Surgery.      Essential hypertension, benign  BP medications on hold as patient is NPO.  BP currently stable off medications.  Add prn IV Hydralazine.  Restart medications once able.    Type 2 diabetes mellitus with mild nonproliferative retinopathy  Holding any oral medications and long acting insulin as long as patient is NPO.  Current glucose of 136.  Continue to monitor on insulin sliding scale with glucose monitoring every 6 hours while NPO.    Hypercholesterolemia  Restart Statin once able to take PO      CAD (coronary artery disease)  Currently denies any chest pain.  Restart ASA and Statin as soon as possible.        VTE Risk Mitigation (From admission, onward)         Ordered     IP VTE LOW RISK PATIENT  Once         03/10/22 0946     Place JOSHUA hose  Until discontinued         03/10/22 0946     Place sequential compression device  Until discontinued         03/10/22 0946                    Thank you for your consult. I will follow-up with patient. Please contact us if you have any additional questions.    Dinesh Sam MD  Department of Hospital Medicine   [facility] Surg

## 2022-03-10 NOTE — PLAN OF CARE
Problem: Adult Inpatient Plan of Care  Goal: Plan of Care Review  Outcome: Ongoing, Progressing  Flowsheets (Taken 3/10/2022 0915)  Plan of Care Reviewed With: patient  Goal: Patient-Specific Goal (Individualized)  Outcome: Ongoing, Progressing     Problem: Diabetes Comorbidity  Goal: Blood Glucose Level Within Targeted Range  Outcome: Ongoing, Progressing  Intervention: Monitor and Manage Glycemia  Flowsheets (Taken 3/10/2022 0915)  Glycemic Management: blood glucose monitored     Problem: Adult Inpatient Plan of Care  Goal: Plan of Care Review  Outcome: Ongoing, Progressing  Flowsheets (Taken 3/10/2022 0915)  Plan of Care Reviewed With: patient     Problem: Adult Inpatient Plan of Care  Goal: Plan of Care Review  Outcome: Ongoing, Progressing  Flowsheets (Taken 3/10/2022 0915)  Plan of Care Reviewed With: patient     Problem: Adult Inpatient Plan of Care  Goal: Patient-Specific Goal (Individualized)  Outcome: Ongoing, Progressing     Problem: Adult Inpatient Plan of Care  Goal: Patient-Specific Goal (Individualized)  Outcome: Ongoing, Progressing     Problem: Diabetes Comorbidity  Goal: Blood Glucose Level Within Targeted Range  Outcome: Ongoing, Progressing  Intervention: Monitor and Manage Glycemia  Flowsheets (Taken 3/10/2022 0915)  Glycemic Management: blood glucose monitored     Problem: Diabetes Comorbidity  Goal: Blood Glucose Level Within Targeted Range  Outcome: Ongoing, Progressing     Problem: Diabetes Comorbidity  Goal: Blood Glucose Level Within Targeted Range  Intervention: Monitor and Manage Glycemia  Flowsheets (Taken 3/10/2022 0915)  Glycemic Management: blood glucose monitored     Problem: Diabetes Comorbidity  Goal: Blood Glucose Level Within Targeted Range  Intervention: Monitor and Manage Glycemia  Flowsheets (Taken 3/10/2022 0915)  Glycemic Management: blood glucose monitored

## 2022-03-10 NOTE — ED PROVIDER NOTES
Encounter Date: 3/10/2022       History     Chief Complaint   Patient presents with    Abdominal Pain     Mid to LLQ abd pain since 1800. Denies any N/V. Normal BM on yesterday, Denies any dysuria     62yo M smoker with chief complaint constant LLQ abdominal pain since approx 6pm yesterday evening (3/9).    No hx diverticulosis. No urinary complaints. No hx nephrolithiasis. 2 normal BMs yesterday. No melena or hematochezia. No hx abdominal surgeries. Pain constant, moderate to severe pressure, nonradiating, to LLQ. No testicular pain. No n/v. No fever. No recent illness. No fever. Pain began as mild, gradually worsening, worse with remaining still, improved with movement.     PMH:  CAD  Hx MI s/p CABG and stenting  HTN  HLD  IDDM  Hx cluster headaches    TTE 4/8/19:    CONCLUSIONS     Normal left ventricular systolic function.     Left ventricular ejection fraction is estimated at 55 %.     Normal transthoracic echocardiogram.          Review of patient's allergies indicates:  No Known Allergies  Past Medical History:   Diagnosis Date    Cluster headaches     Coronary artery disease     s/p stent    Diabetes mellitus     Diabetes mellitus type II     Headache     High cholesterol     Hyperlipidemia     Hypertension     Myocardial infarction      Past Surgical History:   Procedure Laterality Date    COLONOSCOPY N/A 4/1/2021    Procedure: COLONOSCOPY;  Surgeon: Bernard Leach MD;  Location: [facility] ENDO;  Service: Endoscopy;  Laterality: N/A;  covid test 3/29 [facility], current smoker, prep instr emailed, 1 visitor policy explained -ml  3/30 pt understands earlier arrival time and earlier prep time -ml    CORONARY ANGIOPLASTY WITH STENT PLACEMENT      ENDOSCOPIC NASAL SEPTOPLASTY N/A 2/11/2020    Procedure: SEPTOPLASTY, NOSE, ENDOSCOPIC;  Surgeon: Clifton Angel MD;  Location: [facility] OR;  Service: ENT;  Laterality: N/A;  DISC LOADED BY VICENTE ON 2-5-2020  RN PRE OP 2-7-2020 [place]  NEED H/P    FUNCTIONAL  ENDOSCOPIC SINUS SURGERY (FESS) USING COMPUTER-ASSISTED NAVIGATION Bilateral 2018    Procedure: SINUS SURGERY FUNCTIONAL ENDOSCOPIC WITH NAVIGATION;  Surgeon: Sina Cortez MD;  Location: [facility] OR;  Service: ENT;  Laterality: Bilateral;    FUNCTIONAL ENDOSCOPIC SINUS SURGERY (FESS) USING COMPUTER-ASSISTED NAVIGATION Bilateral 2020    Procedure: FESS, USING COMPUTER-ASSISTED NAVIGATION;  Surgeon: Clifton Angel MD;  Location: [facility] OR;  Service: ENT;  Laterality: Bilateral;    left shoulder      ME CABG, ARTERY-VEIN, FOUR  2019    Coronary Artery Bypass, 4    RECONSTRUCTION OF NOSE Bilateral 2018    Procedure: RECONSTRUCTION-NASAL;  Surgeon: Sina Cortez MD;  Location: [facility] OR;  Service: ENT;  Laterality: Bilateral;  [employer]  RN PREOP 2018    TONSILLECTOMY      TURBINATE RESECTION Bilateral 2018    Procedure: TURBINATE CAUTERY RESECTION WITH DEBRIDER (CRISTINA. MAXILLARY & CRISTINA. ETHMOID);  Surgeon: Sina Cortez MD;  Location: [facility] OR;  Service: ENT;  Laterality: Bilateral;    UVULOPALATOPHARYNGOPLASTY      for REJI     Family History   Problem Relation Age of Onset    Diabetes Mother     Diabetes Father     Heart disease Father     Mental illness Brother         suicide    Cancer Neg Hx         prostate or colon     Social History     Tobacco Use    Smoking status: Current Every Day Smoker     Packs/day: 0.75     Types: Cigarettes     Last attempt to quit: 2019     Years since quittin.9    Smokeless tobacco: Never Used    Tobacco comment:  x 34 yr.  Works at [employer].  Three kids.  Walks a lot at work.     Substance Use Topics    Alcohol use: Not Currently     Comment: only on occasion    Drug use: Never     Review of Systems   Constitutional: Negative for appetite change and fever.   Gastrointestinal: Positive for abdominal pain. Negative for blood in stool, constipation, diarrhea, nausea and vomiting.   Genitourinary: Negative for  difficulty urinating, dysuria, frequency, hematuria, penile pain and testicular pain.   Musculoskeletal: Negative for back pain.       Physical Exam     Initial Vitals [03/10/22 0324]   BP Pulse Resp Temp SpO2   109/76 90 18 97.9 °F (36.6 °C) 100 %      MAP       --         Physical Exam    Nursing note and vitals reviewed.  Constitutional: He appears well-developed and well-nourished. He is not diaphoretic. No distress.   Uncomfortable-appearing, nontoxic.  Resting recumbent in bed.   HENT:   Head: Normocephalic and atraumatic.   Neck: Neck supple.   Cardiovascular: Intact distal pulses.   1+DP bilaterally   Pulmonary/Chest: Breath sounds normal. No respiratory distress.   Abdominal: Abdomen is soft. Bowel sounds are normal. He exhibits no distension.   Abdomen overall soft, normal bowel sounds ×4. ttp entirety of lower abdomen, L>R. +rebound ttp LLQ. Mild L sided abdomen ttp. No palpable mass or distention.  No flank or CVA tenderness.   Musculoskeletal:         General: Normal range of motion.      Cervical back: Neck supple.     Neurological: He is alert and oriented to person, place, and time.   Skin: Skin is warm. Capillary refill takes less than 2 seconds.   Psychiatric: He has a normal mood and affect. Thought content normal.         ED Course   Procedures  Labs Reviewed   URINALYSIS, REFLEX TO URINE CULTURE - Abnormal; Notable for the following components:       Result Value    Specific Gravity, UA >1.030 (*)     Glucose, UA 4+ (*)     All other components within normal limits    Narrative:     Specimen Source->Urine   COMPREHENSIVE METABOLIC PANEL - Abnormal; Notable for the following components:    CO2 22 (*)     Glucose 181 (*)     All other components within normal limits   CBC W/ AUTO DIFFERENTIAL   LIPASE   URINALYSIS MICROSCOPIC    Narrative:     Specimen Source->Urine   SARS-COV-2 RDRP GENE   POCT GLUCOSE MONITORING CONTINUOUS          Imaging Results          CT Abdomen Pelvis With Contrast (In  process)  Result time 03/10/22 06:05:06                 Medications   lactated ringers infusion (has no administration in time range)   morphine injection 4 mg (4 mg Intravenous Given 3/10/22 6203)   ondansetron injection 4 mg (4 mg Intravenous Given 3/10/22 0423)   iohexoL (OMNIPAQUE 350) injection 85 mL (85 mLs Intravenous Given 3/10/22 4440)     Medical Decision Making:   Differential Diagnosis:   Diverticulosis, diverticulitis, mesenteric ischemia, AAA, aortic dissection, nephrolithiasis  Clinical Tests:   Lab Tests: Ordered and Reviewed  Radiological Study: Ordered and Reviewed  ED Management:  CT with dilated loops of small bowel in the left abdomen with transition point in mid abdomen. Distal loops of small bowel and colon decompressed. Spoke with radiologist via secure chat. Called and spoke with  on-call general surgery; will admit to general surgery service, consult HM for medical management. Pt understands need for admit.     NPO since 6pm 3/9.                       Clinical Impression:   Final diagnoses:  [K56.609] Small bowel obstruction (Primary)          ED Disposition Condition    Admit               Hunter Rodriguez PA-C  03/10/22 0605

## 2022-03-10 NOTE — PLAN OF CARE
[facility] - Med Surg  Initial Discharge Assessment       Primary Care Provider: Masood Khan MD    Admission Diagnosis: Small bowel obstruction [K56.609]    Admission Date: 3/10/2022  Expected Discharge Date: TBD    Discharge Barriers Identified: None    Payor: CIGNA / Plan: CIGNA OPEN ACCESS PLUS / Product Type: Commercial /     Extended Emergency Contact Information  Primary Emergency Contact: RaminKellye  Address: [address] [place] [address]  Home Phone: [phone]  Mobile Phone: [phone]  Relation: Spouse    Discharge Plan A: Home  Discharge Plan B: Other (TBD)      [id number] [place], [place] - [address]  [address] [place] [address]  Phone: [phone] Fax: [phone]    [facility] [place] - [address]  [address]  [place] [address]  Phone: [phone] Fax: [phone]    [facility] - [place], [place] - [address]  [address]  [place] [address]  Phone: [phone] Fax: [phone]      Initial Assessment (most recent)       Adult Discharge Assessment - 03/10/22 1112          Discharge Assessment    Assessment Type Discharge Planning Assessment     Confirmed/corrected address, phone number and insurance Yes     Confirmed Demographics Correct on Facesheet     Source of Information patient     When was your last doctors appointment? --   last month    Communicated JS with patient/caregiver Date not available/Unable to determine     Reason For Admission abdominal pain     Lives With spouse;parent(s)     Facility Arrived From: home     Do you expect to return to your current living situation? Yes     Do you have help at home or someone to help you manage your care at home? Yes     Who are your caregiver(s) and their phone number(s)? Tammie Faustin (Spouse)   [phone]     Prior to hospitilization cognitive status: Alert/Oriented     Current cognitive  status: Alert/Oriented     Walking or Climbing Stairs Difficulty none     Dressing/Bathing Difficulty none     Equipment Currently Used at Home none     Readmission within 30 days? No     Patient currently being followed by outpatient case management? No     Do you currently have service(s) that help you manage your care at home? No     Do you take prescription medications? Yes     Do you have prescription coverage? Yes     Coverage [employer]     Do you have any problems affording any of your prescribed medications? No     Is the patient taking medications as prescribed? yes     Who is going to help you get home at discharge? Tammie Faustin (Spouse)     How do you get to doctors appointments? car, drives self     Are you on dialysis? No     Do you take coumadin? --   baby aspirin    Discharge Plan A Home     Discharge Plan B Other   TBD    DME Needed Upon Discharge  none     Discharge Plan discussed with: Patient     Discharge Barriers Identified None        Relationship/Environment    Name(s) of Who Lives With Patient Tammie Faustin (Spouse)                   SW met with pt at bedside and explained role in the hospital. Pt lives with his wife and mother. Pt's wife will  pt upon discharge. Pt is independent, does not use any DME and drives himself to doctor appointments. Pt stated he prefers afternoon appointments. [facility]      DC needs: none known at this time.

## 2022-03-10 NOTE — NURSING
Upon arrival to floor from ED:  patient oriented to room, call bell in reach and bed in lowest position. Denies pain or discomfort at this time. No apparent distress noted.

## 2022-03-10 NOTE — SUBJECTIVE & OBJECTIVE
Past Medical History:   Diagnosis Date    Cluster headaches     Coronary artery disease     s/p stent    Diabetes mellitus     Diabetes mellitus type II     Headache     High cholesterol     Hyperlipidemia     Hypertension     Myocardial infarction        Past Surgical History:   Procedure Laterality Date    COLONOSCOPY N/A 4/1/2021    Procedure: COLONOSCOPY;  Surgeon: Bernard Leach MD;  Location: [facility] ENDO;  Service: Endoscopy;  Laterality: N/A;  covid test 3/29 lapalco, current smoker, prep instr emailed, 1 visitor policy explained -ml  3/30 pt understands earlier arrival time and earlier prep time -ml    CORONARY ANGIOPLASTY WITH STENT PLACEMENT      ENDOSCOPIC NASAL SEPTOPLASTY N/A 2/11/2020    Procedure: SEPTOPLASTY, NOSE, ENDOSCOPIC;  Surgeon: Clifton Angel MD;  Location: [facility] OR;  Service: ENT;  Laterality: N/A;  DISC LOADED BY [employer] ON 2-5-2020  RN PRE OP 2-7-2020 [place]  NEED H/P    FUNCTIONAL ENDOSCOPIC SINUS SURGERY (FESS) USING COMPUTER-ASSISTED NAVIGATION Bilateral 6/26/2018    Procedure: SINUS SURGERY FUNCTIONAL ENDOSCOPIC WITH NAVIGATION;  Surgeon: Sina Cortez MD;  Location: [facility] OR;  Service: ENT;  Laterality: Bilateral;    FUNCTIONAL ENDOSCOPIC SINUS SURGERY (FESS) USING COMPUTER-ASSISTED NAVIGATION Bilateral 2/11/2020    Procedure: FESS, USING COMPUTER-ASSISTED NAVIGATION;  Surgeon: Clifton Angel MD;  Location: [facility] OR;  Service: ENT;  Laterality: Bilateral;    left shoulder      NH CABG, ARTERY-VEIN, FOUR  04/12/2019    Coronary Artery Bypass, 4    RECONSTRUCTION OF NOSE Bilateral 6/26/2018    Procedure: RECONSTRUCTION-NASAL;  Surgeon: Sina Cortez MD;  Location: [facility] OR;  Service: ENT;  Laterality: Bilateral;  [employer]  RN PREOP 6/20/2018    TONSILLECTOMY      TURBINATE RESECTION Bilateral 6/26/2018    Procedure: TURBINATE CAUTERY RESECTION WITH DEBRIDER (CRISTINA. MAXILLARY & CRISTINA. ETHMOID);  Surgeon: Sina Cortez MD;  Location: [facility] OR;  Service: ENT;  Laterality: Bilateral;     "UVULOPALATOPHARYNGOPLASTY      for REJI       Review of patient's allergies indicates:  No Known Allergies    No current facility-administered medications on file prior to encounter.     Current Outpatient Medications on File Prior to Encounter   Medication Sig    aspirin (ECOTRIN) 81 MG EC tablet Take 81 mg by mouth once daily.    atorvastatin (LIPITOR) 40 MG tablet TAKE 1 TABLET BY MOUTH EVERY DAY    buPROPion (WELLBUTRIN XL) 150 MG TB24 tablet Take 1 tablet (150 mg total) by mouth once daily.    JARDIANCE 25 mg tablet TAKE 1 TABLET BY MOUTH EVERY DAY    lisinopriL (PRINIVIL,ZESTRIL) 5 MG tablet Take 1 tablet (5 mg total) by mouth once daily.    metFORMIN (GLUCOPHAGE) 1000 MG tablet Take 1 tablet (1,000 mg total) by mouth 2 (two) times daily with meals.    metoprolol tartrate (LOPRESSOR) 25 MG tablet Take 0.5 tablets (12.5 mg total) by mouth 2 (two) times daily.    nateglinide (STARLIX) 120 MG tablet TAKE 1 TABLET BY MOUTH THREE TIMES DAILY BEFORE EACH MEAL    semaglutide (OZEMPIC) 1 mg/dose (4 mg/3 mL) INJECT 1 MG UNDER THE SKIN EVERY 7 DAYS    TRESIBA FLEXTOUCH U-100 100 unit/mL (3 mL) insulin pen Inject 35 Units into the skin every evening.    blood sugar diagnostic Strp 1 strip by Misc.(Non-Drug; Combo Route) route 3 (three) times daily.    blood-glucose meter kit Use as instructed    clotrimazole-betamethasone 1-0.05% (LOTRISONE) cream APPLY 1 TO 2 TIMES A DAY AS NEEDED    diclofenac sodium (VOLTAREN) 1 % Gel Apply 2 g topically.    lancets Misc 1 application by Misc.(Non-Drug; Combo Route) route 3 (three) times daily.    nitroGLYCERIN (NITROSTAT) 0.4 MG SL tablet PLACE ONE TABLET UNDER THE TONGUE EVERY 5 MINUTES AS NEEDED    pen needle, diabetic (BD ULTRA-FINE VIRGILIO PEN NEEDLE) 32 gauge x 5/32" Ndle 1 application by Misc.(Non-Drug; Combo Route) route 3 (three) times daily.    semaglutide (OZEMPIC) 1 mg/dose (2 mg/1.5 mL) PnIj INJECT 1 MG UNDER THE SKIN EVERY 7 DAYS    tadalafiL (CIALIS) 20 MG Tab Take 1 tablet " (20 mg total) by mouth daily as needed.     Family History       Problem Relation (Age of Onset)    Diabetes Mother, Father    Heart disease Father    Mental illness Brother          Tobacco Use    Smoking status: Current Every Day Smoker     Packs/day: 0.75     Types: Cigarettes     Last attempt to quit: 2019     Years since quittin.9    Smokeless tobacco: Never Used    Tobacco comment:  x 34 yr.  Works at [employer].  Three kids.  Walks a lot at work.     Substance and Sexual Activity    Alcohol use: Not Currently     Comment: only on occasion    Drug use: Never    Sexual activity: Yes     Partners: Female     Review of Systems   Constitutional:  Negative for chills and fever.   HENT:  Negative for ear discharge and ear pain.    Eyes:  Negative for discharge and itching.   Respiratory:  Negative for cough and shortness of breath.    Cardiovascular:  Negative for chest pain and palpitations.   Gastrointestinal:  Positive for abdominal pain. Negative for diarrhea.   Endocrine: Negative for cold intolerance and heat intolerance.   Genitourinary:  Negative for difficulty urinating and dysuria.   Musculoskeletal:  Negative for neck pain and neck stiffness.   Skin:  Negative for rash and wound.   Neurological:  Negative for seizures and syncope.   Psychiatric/Behavioral:  Negative for agitation and hallucinations.    Objective:     Vital Signs (Most Recent):  Temp: 97.6 °F (36.4 °C) (03/10/22 1159)  Pulse: 69 (03/10/22 1159)  Resp: 20 (03/10/22 1308)  BP: 122/73 (03/10/22 1159)  SpO2: 97 % (03/10/22 1159) Vital Signs (24h Range):  Temp:  [97.6 °F (36.4 °C)-98.1 °F (36.7 °C)] 97.6 °F (36.4 °C)  Pulse:  [69-90] 69  Resp:  [17-20] 20  SpO2:  [96 %-100 %] 97 %  BP: (100-122)/(63-76) 122/73     Weight: 88 kg (194 lb 0.1 oz)  Body mass index is 28.65 kg/m².    Physical Exam  Constitutional:       General: He is not in acute distress.     Appearance: He is not diaphoretic.   HENT:      Head:  Normocephalic and atraumatic.      Nose:      Comments: NGT in place  Eyes:      General:         Right eye: No discharge.         Left eye: No discharge.      Conjunctiva/sclera: Conjunctivae normal.   Cardiovascular:      Rate and Rhythm: Normal rate and regular rhythm.   Pulmonary:      Effort: No respiratory distress.      Breath sounds: Normal breath sounds.   Abdominal:      General: There is distension.      Palpations: Abdomen is soft.      Tenderness: There is no abdominal tenderness.   Musculoskeletal:         General: No deformity or signs of injury.   Skin:     General: Skin is warm and dry.   Neurological:      Mental Status: He is oriented to person, place, and time.      Cranial Nerves: No cranial nerve deficit.       Significant Labs: All pertinent labs within the past 24 hours have been reviewed.  BMP:   Recent Labs   Lab 03/10/22  0410   *      K 4.4      CO2 22*   BUN 17   CREATININE 1.2   CALCIUM 9.2     CBC:   Recent Labs   Lab 03/10/22  0410   WBC 9.77   HGB 16.2   HCT 47.0          Significant Imaging: I have reviewed all pertinent imaging results/findings within the past 24 hours.

## 2022-03-11 VITALS
HEART RATE: 93 BPM | HEIGHT: 69 IN | DIASTOLIC BLOOD PRESSURE: 74 MMHG | OXYGEN SATURATION: 95 % | SYSTOLIC BLOOD PRESSURE: 135 MMHG | BODY MASS INDEX: 28.73 KG/M2 | WEIGHT: 194 LBS | TEMPERATURE: 98 F | RESPIRATION RATE: 16 BRPM

## 2022-03-11 LAB
ALBUMIN SERPL BCP-MCNC: 3.6 G/DL (ref 3.5–5.2)
ALP SERPL-CCNC: 78 U/L (ref 55–135)
ALT SERPL W/O P-5'-P-CCNC: 20 U/L (ref 10–44)
ANION GAP SERPL CALC-SCNC: 11 MMOL/L (ref 8–16)
AST SERPL-CCNC: 18 U/L (ref 10–40)
BASOPHILS # BLD AUTO: 0.04 K/UL (ref 0–0.2)
BASOPHILS NFR BLD: 0.5 % (ref 0–1.9)
BILIRUB SERPL-MCNC: 1.2 MG/DL (ref 0.1–1)
BUN SERPL-MCNC: 19 MG/DL (ref 6–20)
CALCIUM SERPL-MCNC: 9.3 MG/DL (ref 8.7–10.5)
CHLORIDE SERPL-SCNC: 105 MMOL/L (ref 95–110)
CO2 SERPL-SCNC: 28 MMOL/L (ref 23–29)
CREAT SERPL-MCNC: 1.1 MG/DL (ref 0.5–1.4)
DIFFERENTIAL METHOD: NORMAL
EOSINOPHIL # BLD AUTO: 0.1 K/UL (ref 0–0.5)
EOSINOPHIL NFR BLD: 1.2 % (ref 0–8)
ERYTHROCYTE [DISTWIDTH] IN BLOOD BY AUTOMATED COUNT: 13.3 % (ref 11.5–14.5)
EST. GFR  (AFRICAN AMERICAN): >60 ML/MIN/1.73 M^2
EST. GFR  (NON AFRICAN AMERICAN): >60 ML/MIN/1.73 M^2
GLUCOSE SERPL-MCNC: 78 MG/DL (ref 70–110)
HCT VFR BLD AUTO: 49.7 % (ref 40–54)
HGB BLD-MCNC: 16.2 G/DL (ref 14–18)
IMM GRANULOCYTES # BLD AUTO: 0.03 K/UL (ref 0–0.04)
IMM GRANULOCYTES NFR BLD AUTO: 0.4 % (ref 0–0.5)
LYMPHOCYTES # BLD AUTO: 2.3 K/UL (ref 1–4.8)
LYMPHOCYTES NFR BLD: 26.8 % (ref 18–48)
MCH RBC QN AUTO: 30.3 PG (ref 27–31)
MCHC RBC AUTO-ENTMCNC: 32.6 G/DL (ref 32–36)
MCV RBC AUTO: 93 FL (ref 82–98)
MONOCYTES # BLD AUTO: 0.6 K/UL (ref 0.3–1)
MONOCYTES NFR BLD: 6.7 % (ref 4–15)
NEUTROPHILS # BLD AUTO: 5.5 K/UL (ref 1.8–7.7)
NEUTROPHILS NFR BLD: 64.4 % (ref 38–73)
NRBC BLD-RTO: 0 /100 WBC
PLATELET # BLD AUTO: 223 K/UL (ref 150–450)
PMV BLD AUTO: 9.6 FL (ref 9.2–12.9)
POCT GLUCOSE: 177 MG/DL (ref 70–110)
POCT GLUCOSE: 71 MG/DL (ref 70–110)
POCT GLUCOSE: 72 MG/DL (ref 70–110)
POCT GLUCOSE: 95 MG/DL (ref 70–110)
POTASSIUM SERPL-SCNC: 4.8 MMOL/L (ref 3.5–5.1)
PROT SERPL-MCNC: 6.8 G/DL (ref 6–8.4)
RBC # BLD AUTO: 5.35 M/UL (ref 4.6–6.2)
SODIUM SERPL-SCNC: 144 MMOL/L (ref 136–145)
WBC # BLD AUTO: 8.52 K/UL (ref 3.9–12.7)

## 2022-03-11 PROCEDURE — 99238 PR HOSPITAL DISCHARGE DAY,<30 MIN: ICD-10-PCS | Mod: ,,, | Performed by: SURGERY

## 2022-03-11 PROCEDURE — 99238 HOSP IP/OBS DSCHRG MGMT 30/<: CPT | Mod: ,,, | Performed by: SURGERY

## 2022-03-11 PROCEDURE — 63600175 PHARM REV CODE 636 W HCPCS: Performed by: PHYSICIAN ASSISTANT

## 2022-03-11 PROCEDURE — 36415 COLL VENOUS BLD VENIPUNCTURE: CPT | Performed by: PHYSICIAN ASSISTANT

## 2022-03-11 PROCEDURE — 80053 COMPREHEN METABOLIC PANEL: CPT | Performed by: PHYSICIAN ASSISTANT

## 2022-03-11 PROCEDURE — 25000003 PHARM REV CODE 250: Performed by: PHYSICIAN ASSISTANT

## 2022-03-11 PROCEDURE — 85025 COMPLETE CBC W/AUTO DIFF WBC: CPT | Performed by: PHYSICIAN ASSISTANT

## 2022-03-11 RX ADMIN — SODIUM CHLORIDE, SODIUM LACTATE, POTASSIUM CHLORIDE, AND CALCIUM CHLORIDE: .6; .31; .03; .02 INJECTION, SOLUTION INTRAVENOUS at 10:03

## 2022-03-11 RX ADMIN — LISINOPRIL 5 MG: 5 TABLET ORAL at 01:03

## 2022-03-11 RX ADMIN — ATORVASTATIN CALCIUM 40 MG: 40 TABLET, FILM COATED ORAL at 01:03

## 2022-03-11 RX ADMIN — FAMOTIDINE 20 MG: 10 INJECTION INTRAVENOUS at 08:03

## 2022-03-11 RX ADMIN — BUPROPION HYDROCHLORIDE 150 MG: 150 TABLET, FILM COATED, EXTENDED RELEASE ORAL at 01:03

## 2022-03-11 RX ADMIN — METOPROLOL TARTRATE 12.5 MG: 25 TABLET, FILM COATED ORAL at 01:03

## 2022-03-11 NOTE — HOSPITAL COURSE
Patient presented complaining of abdominal pain and CT revealed concern for SBO  NG was placed and a small bowel follow through was performed resulting in bowel movements on 3/11. Started on CLD which he tolerated and will be discharged today    Physical Exam:  General: NAD, laying comfortably in bed  HEENT: NG in place, moist mucosal membranes  CV: RRR  Pulm: Breathing comfortably on room air, no distress  Abd: Soft NTND  Ext: No edema, moves all  MSK: Normal inspection, normal strength and ROM  Psych: Appropriate mood and affect  Neuro: AAOx3

## 2022-03-11 NOTE — PLAN OF CARE
Problem: Adult Inpatient Plan of Care  Goal: Plan of Care Review  Outcome: Ongoing, Progressing  Goal: Patient-Specific Goal (Individualized)  Outcome: Ongoing, Progressing  Goal: Absence of Hospital-Acquired Illness or Injury  Outcome: Ongoing, Progressing  Goal: Optimal Comfort and Wellbeing  Outcome: Ongoing, Progressing  Goal: Readiness for Transition of Care  Outcome: Ongoing, Progressing     Problem: Diabetes Comorbidity  Goal: Blood Glucose Level Within Targeted Range  Outcome: Ongoing, Progressing       Patient remained free from falls/injury throughout shift.  No acute changes in status.   Bed locked in lowest position.  Side rails up x2.  Call bell within reach.  Purposeful rounding maintained throughout shift.

## 2022-03-11 NOTE — DISCHARGE SUMMARY
[facility] Surg  Discharge Note  Short Stay    * No surgery found *    OUTCOME: Patient tolerated treatment/procedure well without complication and is now ready for discharge.    DISPOSITION: Home or Self Care    FINAL DIAGNOSIS:  SBO (small bowel obstruction)    FOLLOWUP: None    DISCHARGE INSTRUCTIONS:    Discharge Procedure Orders   Diet Adult Regular     Notify your health care provider if you experience any of the following:  temperature >100.4     Notify your health care provider if you experience any of the following:  persistent nausea and vomiting or diarrhea     Notify your health care provider if you experience any of the following:  severe uncontrolled pain     Activity as tolerated        TIME SPENT ON DISCHARGE: 10 minutes  
htn urgency     hx HTN, DM

## 2022-03-11 NOTE — NURSING
Pt has been discharged, respiration even and unlabored, no acute distress, no complaints of pain, safety precautions in place, pt verbalized understanding of discharge instructions, pt received morning med's. Will continue to monitor. Ng tube and Iv was taken out. Pt left with wife

## 2022-03-11 NOTE — PLAN OF CARE
[facility] - Med Surg  Discharge Final Note  TN talked with med surg nurse Clementina, patient left.  Clementina to call with patient appts for pcp and Dr. Barrera.  Primary Care Provider: Masood Khan MD    Expected Discharge Date: 3/11/2022    Final Discharge Note (most recent)     Final Note - 03/11/22 1543        Final Note    Assessment Type Final Discharge Note     Anticipated Discharge Disposition Home or Self Care     Hospital Resources/Appts/Education Provided Appointments scheduled by Navigator/Coordinator;Provided patient/caregiver with written discharge plan information        Post-Acute Status    Coverage [employer] Open Access     Discharge Delays None known at this time                 Important Message from Medicare

## 2022-03-11 NOTE — H&P
[facility] -          General Surgery History and Physical    03/11/2022  11:32 AM    Stanley Faustin  [id number]    SUBJECTIVE:                                                                                           Reason for Admission: SBO    HPI: 62yo male w/ hx of CAD s/p PCI, HTN, DM that presents to the ED c/o abdominal pain. Has never had similar pains in the past.  CT in the ED showed concern for SBO  Currently the patient was reporting mild abdominal discomfort. Last BM was yesterday, last flatus was yesterday  Denies abdominal surgery  Has had colonoscopy, which was reportedly normal    ROS - 12pt ROS negative except those mentioned in the HPI    Objective:                                                                                                  Past Medical History:   Diagnosis Date    Cluster headaches     Coronary artery disease     s/p stent    Diabetes mellitus     Diabetes mellitus type II     Headache     High cholesterol     Hyperlipidemia     Hypertension     Myocardial infarction        Past Surgical History:   Procedure Laterality Date    COLONOSCOPY N/A 4/1/2021    Procedure: COLONOSCOPY;  Surgeon: Bernard Leach MD;  Location: [facility] ENDO;  Service: Endoscopy;  Laterality: N/A;  covid test 3/29 [facility], current smoker, prep instr emailed, 1 visitor policy explained -ml  3/30 pt understands earlier arrival time and earlier prep time -ml    CORONARY ANGIOPLASTY WITH STENT PLACEMENT      ENDOSCOPIC NASAL SEPTOPLASTY N/A 2/11/2020    Procedure: SEPTOPLASTY, NOSE, ENDOSCOPIC;  Surgeon: Clifton Angel MD;  Location: [facility] OR;  Service: ENT;  Laterality: N/A;  DISC LOADED BY [facility] ON 2-5-2020  RN PRE OP 2-7-2020 [place]  NEED H/P    FUNCTIONAL ENDOSCOPIC SINUS SURGERY (FESS) USING COMPUTER-ASSISTED NAVIGATION Bilateral 6/26/2018    Procedure: SINUS SURGERY FUNCTIONAL ENDOSCOPIC WITH NAVIGATION;  Surgeon: Sina Cortez MD;  Location: [facility] OR;  Service: ENT;  Laterality:  Bilateral;    FUNCTIONAL ENDOSCOPIC SINUS SURGERY (FESS) USING COMPUTER-ASSISTED NAVIGATION Bilateral 2020    Procedure: FESS, USING COMPUTER-ASSISTED NAVIGATION;  Surgeon: Clifton Angel MD;  Location: [facility] OR;  Service: ENT;  Laterality: Bilateral;    left shoulder      NE CABG, ARTERY-VEIN, FOUR  2019    Coronary Artery Bypass, 4    RECONSTRUCTION OF NOSE Bilateral 2018    Procedure: RECONSTRUCTION-NASAL;  Surgeon: Sina Cortez MD;  Location: [facility] OR;  Service: ENT;  Laterality: Bilateral;  [employer]  RN PREOP 2018    TONSILLECTOMY      TURBINATE RESECTION Bilateral 2018    Procedure: TURBINATE CAUTERY RESECTION WITH DEBRIDER (CRISTINA. MAXILLARY & CRISTINA. ETHMOID);  Surgeon: Sina Cortez MD;  Location: [facility] OR;  Service: ENT;  Laterality: Bilateral;    UVULOPALATOPHARYNGOPLASTY      for REJI       Family History   Problem Relation Age of Onset    Diabetes Mother     Diabetes Father     Heart disease Father     Mental illness Brother         suicide    Cancer Neg Hx         prostate or colon       Social History     Socioeconomic History    Marital status:    Occupational History     Employer: [employer]   Tobacco Use    Smoking status: Current Every Day Smoker     Packs/day: 0.75     Types: Cigarettes     Last attempt to quit: 2019     Years since quittin.9    Smokeless tobacco: Never Used    Tobacco comment:  x 32 yr.  Works at [employer].  Three kids.  Walks a lot at work.     Substance and Sexual Activity    Alcohol use: Not Currently     Comment: only on occasion    Drug use: Never    Sexual activity: Yes     Partners: Female       Vitals:    22 1116   BP: 135/74   Pulse: 93   Resp:    Temp: 98.1 °F (36.7 °C)       Recent Labs     22  0428   WBC 8.52   HGB 16.2   HCT 49.7           Recent Labs     22  0428      K 4.8      CO2 28   BUN 19   CREATININE 1.1   GLU 78          PHYSICAL  EXAM:    Physical Exam:    GEN: NAD, laying comfortably in bed  HEENT: moist mucosal membranes, NG in place  RESP: Breathing comfortably on room air, no distress  CV: RRR  ABD: Soft, NTND  EXT: No edema, moves all  MSK: Normal inspection, moves all  SKIN: Warm, dry, intact  PSYCH: Appropriate mood and affect  NEURO: AAOx3    Imaging Results          XR Small Bowel Follow Through (Final result)  Result time 03/10/22 20:11:39    Final result by Oj Sanchez MD (03/10/22 20:11:39)                 Impression:      Small bowel obstruction.      Electronically signed by: Oj Sanchez MD  Date:    03/10/2022  Time:    20:11             Narrative:    EXAMINATION:  XR SMALL BOWEL FOLLOW THROUGH    CLINICAL HISTORY:  SBO;    TECHNIQUE:  Multiple AP radiographs were obtained following contrast administration for small-bowel follow-through.    COMPARISON:  CT abdomen and pelvis from the same date.    FINDINGS:  240 cc Gastrografin contrast was administered through patient's enteric tube and extends into the stomach.  Diffuse abnormal dilatation of small bowel loops with residual contrast is seen at 08:00 hours consistent with small-bowel obstruction.  Residual contrast is noted within the urinary bladder.                                CT Abdomen Pelvis With Contrast (Final result)  Result time 03/10/22 06:05:36    Final result by Luz Maria Quintero MD (03/10/22 06:05:36)                 Impression:      1. Findings concerning for small bowel obstruction, possibly high-grade, involving loops of mid small bowel with transition point in the mid abdomen as detailed above.  Emergent surgical consultation is advised.  2. Calcific atherosclerosis of the coronary vessels, coronary artery atherosclerosis, median sternotomy and coronary artery stents.  3. Additional findings as detailed above.      Electronically signed by: Luz Maria Quintero MD  Date:    03/10/2022  Time:    06:05             Narrative:    EXAMINATION:  CT ABDOMEN PELVIS  WITH CONTRAST    CLINICAL HISTORY:  LLQ abdominal pain;    TECHNIQUE:  Low dose axial images, sagittal and coronal reformations were obtained from the lung bases to the pubic symphysis following the IV administration of 85 mL of Omnipaque 350 .  Oral contrast was not given.    COMPARISON:  None.    FINDINGS:  The visualized lung bases are free of pleural fluid or focal consolidation.  The visualized portions of the heart and pericardium demonstrate calcific atherosclerosis of the coronary vessels and presumed coronary artery stents.  Incompletely visualized postoperative change of median sternotomy.    The liver demonstrates no focal abnormality.  No calcified stones are identified in the gallbladder lumen.  The spleen, pancreas and adrenal glands are unremarkable.  The portal vein and splenic vein appear grossly patent.  No significant intra or extrahepatic biliary ductal dilatation.    The kidneys are normal in size and location enhance symmetrically.  No evidence of hydronephrosis.  The urinary bladder is within normal limits for its given degree of distention.  The prostate is unremarkable.    The stomach is decompressed.  Hyperdensity within the gastric lumen presumably relates to ingested material.  There are several dilated loops of fluid and air-filled small bowel within the mid and left abdomen with associated fecalization of small bowel contents.  Findings are consistent with small bowel obstruction.  Apparent transition point in the mid abdomen (axial series 2, image 72-76).  The appearance/configuration of these dilated small bowel loops are concerning for possible high-grade obstruction noting the distal small bowel loops are decompressed and the colon is relatively decompressed.  The appendix is unremarkable.  There is no ascites, portal venous gas or free intraperitoneal air.    The abdominal aorta is nonaneurysmal with atherosclerosis.  No bulky lymphadenopathy.    The visualized osseous structures  are intact.  Extraperitoneal soft tissues are within normal limits.    Findings were discussed with physician assistant BEBA Soto, at time of discovery/dictation at 05:15 on 03/10/2022 via epic secure chat messenger.  Receipt of results was confirmed.    This report was flagged in Epic as abnormal.                                  ASSESSMENT / PLAN:                                                                             Case of 61 y.o. male w/ DM, HTN and CAD that presents c/o abdominal pain. CT shows concerned for SBO    - Had NG placed. Will perform SBFT      Gideon Antonio MD  [facility] Surgery PGY-V

## 2022-03-15 NOTE — PHYSICIAN QUERY
PT Name: Stanley Faustin  MR #: [id number]     DOCUMENTATION CLARIFICATION     CDS/: Carla Rosario RN, CDS               Contact information: [email or web address]    This form is a permanent document in the medical record.     Query Date: March 15, 2022    By submitting this query, we are merely seeking further clarification of documentation to reflect the severity of illness of your patient. Please utilize your independent clinical judgment when addressing the question(s) below.    The medical record reflects the following:     Indicators   Supporting Clinical Findings Location in Medical Record   x Bowel obstruction, intestinal obstruction, LBO or SBO documented Reason for Admission: SBO H&P 3/10    x Radiology findings Impression:  1. Findings concerning for small bowel obstruction, possibly high-grade, involving loops of mid small bowel with transition point in the mid abdomen as detailed above.  Emergent surgical consultation is advised.   CT Abdomen 3/10    x Treatment/Medication Had NG placed. Will perform SBFT H&P 3/10     Procedure/Surgery     x Other 62yo male w/ hx of CAD s/p PCI, HTN, DM that presents to the ED c/o abdominal pain. Has never had similar pains in the past.  CT in the ED showed concern for SBO  Currently the patient was reporting mild abdominal discomfort. Last BM was yesterday, last flatus was yesterday  Denies abdominal surgery  Has had colonoscopy, which was reportedly normal H&P 3/10      Provider, please further specify the bowel obstruction diagnosis:  [   ] Partial or incomplete intestinal obstruction, due to known or suspected etiology (please specify): ____________   [ X  ] Partial or incomplete intestinal obstruction, unknown or unspecified etiology   [   ] Complete intestinal obstruction, due to known or suspected etiology (please specify): ____________   [   ] Complete intestinal obstruction, unknown or unspecified etiology   [   ] Other intestinal condition  (please specify): _____________________   [   ] Clinically Undetermined           Please document in your progress notes daily for the duration of treatment until resolved, and include in your discharge summary.

## 2022-03-16 ENCOUNTER — OFFICE VISIT (OUTPATIENT)
Dept: SURGERY | Facility: CLINIC | Age: 62
End: 2022-03-16
Payer: COMMERCIAL

## 2022-03-16 VITALS
DIASTOLIC BLOOD PRESSURE: 83 MMHG | BODY MASS INDEX: 29.39 KG/M2 | WEIGHT: 198.44 LBS | HEIGHT: 69 IN | HEART RATE: 86 BPM | SYSTOLIC BLOOD PRESSURE: 118 MMHG

## 2022-03-16 DIAGNOSIS — K56.690 OTHER PARTIAL INTESTINAL OBSTRUCTION: ICD-10-CM

## 2022-03-16 PROCEDURE — 3079F PR MOST RECENT DIASTOLIC BLOOD PRESSURE 80-89 MM HG: ICD-10-PCS | Mod: CPTII,S$GLB,, | Performed by: SURGERY

## 2022-03-16 PROCEDURE — 1111F DSCHRG MED/CURRENT MED MERGE: CPT | Mod: CPTII,S$GLB,, | Performed by: SURGERY

## 2022-03-16 PROCEDURE — 3008F BODY MASS INDEX DOCD: CPT | Mod: CPTII,S$GLB,, | Performed by: SURGERY

## 2022-03-16 PROCEDURE — 3074F PR MOST RECENT SYSTOLIC BLOOD PRESSURE < 130 MM HG: ICD-10-PCS | Mod: CPTII,S$GLB,, | Performed by: SURGERY

## 2022-03-16 PROCEDURE — 3052F PR MOST RECENT HEMOGLOBIN A1C LEVEL 8.0 - < 9.0%: ICD-10-PCS | Mod: CPTII,S$GLB,, | Performed by: SURGERY

## 2022-03-16 PROCEDURE — 3052F HG A1C>EQUAL 8.0%<EQUAL 9.0%: CPT | Mod: CPTII,S$GLB,, | Performed by: SURGERY

## 2022-03-16 PROCEDURE — 3079F DIAST BP 80-89 MM HG: CPT | Mod: CPTII,S$GLB,, | Performed by: SURGERY

## 2022-03-16 PROCEDURE — 3074F SYST BP LT 130 MM HG: CPT | Mod: CPTII,S$GLB,, | Performed by: SURGERY

## 2022-03-16 PROCEDURE — 1159F PR MEDICATION LIST DOCUMENTED IN MEDICAL RECORD: ICD-10-PCS | Mod: CPTII,S$GLB,, | Performed by: SURGERY

## 2022-03-16 PROCEDURE — 99214 OFFICE O/P EST MOD 30 MIN: CPT | Mod: S$GLB,,, | Performed by: SURGERY

## 2022-03-16 PROCEDURE — 99214 PR OFFICE/OUTPT VISIT, EST, LEVL IV, 30-39 MIN: ICD-10-PCS | Mod: S$GLB,,, | Performed by: SURGERY

## 2022-03-16 PROCEDURE — 3008F PR BODY MASS INDEX (BMI) DOCUMENTED: ICD-10-PCS | Mod: CPTII,S$GLB,, | Performed by: SURGERY

## 2022-03-16 PROCEDURE — 1159F MED LIST DOCD IN RCRD: CPT | Mod: CPTII,S$GLB,, | Performed by: SURGERY

## 2022-03-16 PROCEDURE — 1111F PR DISCHARGE MEDS RECONCILED W/ CURRENT OUTPATIENT MED LIST: ICD-10-PCS | Mod: CPTII,S$GLB,, | Performed by: SURGERY

## 2022-03-16 PROCEDURE — 99999 PR PBB SHADOW E&M-EST. PATIENT-LVL IV: CPT | Mod: PBBFAC,,, | Performed by: SURGERY

## 2022-03-16 PROCEDURE — 99999 PR PBB SHADOW E&M-EST. PATIENT-LVL IV: ICD-10-PCS | Mod: PBBFAC,,, | Performed by: SURGERY

## 2022-03-16 NOTE — H&P
History & Physical    SUBJECTIVE:     History of Present Illness:  Patient is a 61 y.o. male presents with follow-up after possible bowel obstruction.  Patient was briefly admitted last week with abdominal pain and CT scan demonstrating concern for transition point with a dilated loop of bowel.  This had quickly resolved and he was discharged with Gastrografin ensuring transit to the colon and the patient has felt fine since this has never occurred in the past he eats well and has normal bowel movements.  He has never had abdominal surgery before.  I reviewed the CT scan it did not appear to have any thickened bowel there was no tumor and he was not really clinically obstructed he was not nauseated not vomiting and continued to have flatus and bowel movements.  He is up-to-date with colonoscopies no recent weight loss and I told him it could have been due to enteritis.  He will follow-up with a repeat CT scan in 3 months to ensure resolution and no development of any possible mass or further dilation of the bowel.  No fever chills nausea vomiting shortness of breath chest pain    Chief Complaint   Patient presents with    Consult    Follow-up       Review of patient's allergies indicates:  No Known Allergies    Current Outpatient Medications   Medication Sig Dispense Refill    aspirin (ECOTRIN) 81 MG EC tablet Take 81 mg by mouth once daily.      atorvastatin (LIPITOR) 40 MG tablet TAKE 1 TABLET BY MOUTH EVERY DAY 90 tablet 3    blood sugar diagnostic Strp 1 strip by Misc.(Non-Drug; Combo Route) route 3 (three) times daily. 200 strip 5    blood-glucose meter kit Use as instructed 1 each 0    buPROPion (WELLBUTRIN XL) 150 MG TB24 tablet Take 1 tablet (150 mg total) by mouth once daily. 30 tablet 11    clotrimazole-betamethasone 1-0.05% (LOTRISONE) cream APPLY 1 TO 2 TIMES A DAY AS NEEDED 45 g 2    diclofenac sodium (VOLTAREN) 1 % Gel Apply 2 g topically.      JARDIANCE 25 mg tablet TAKE 1 TABLET BY MOUTH  "EVERY DAY 90 tablet 3    lancets Misc 1 application by Misc.(Non-Drug; Combo Route) route 3 (three) times daily. 100 each 5    lisinopriL (PRINIVIL,ZESTRIL) 5 MG tablet Take 1 tablet (5 mg total) by mouth once daily. 90 tablet 3    metFORMIN (GLUCOPHAGE) 1000 MG tablet Take 1 tablet (1,000 mg total) by mouth 2 (two) times daily with meals. 180 tablet 3    metoprolol tartrate (LOPRESSOR) 25 MG tablet Take 0.5 tablets (12.5 mg total) by mouth 2 (two) times daily. 90 tablet 3    nateglinide (STARLIX) 120 MG tablet TAKE 1 TABLET BY MOUTH THREE TIMES DAILY BEFORE EACH MEAL 90 tablet 0    nitroGLYCERIN (NITROSTAT) 0.4 MG SL tablet PLACE ONE TABLET UNDER THE TONGUE EVERY 5 MINUTES AS NEEDED 25 tablet 2    pen needle, diabetic (BD ULTRA-FINE VIRGILIO PEN NEEDLE) 32 gauge x 5/32" Ndle 1 application by Misc.(Non-Drug; Combo Route) route 3 (three) times daily. 200 each 0    semaglutide (OZEMPIC) 1 mg/dose (4 mg/3 mL) INJECT 1 MG UNDER THE SKIN EVERY 7 DAYS 12 pen 3    tadalafiL (CIALIS) 20 MG Tab Take 1 tablet (20 mg total) by mouth daily as needed. 30 tablet 1    TRESIBA FLEXTOUCH U-100 100 unit/mL (3 mL) insulin pen Inject 35 Units into the skin every evening. 15 pen 0    semaglutide (OZEMPIC) 1 mg/dose (2 mg/1.5 mL) PnIj INJECT 1 MG UNDER THE SKIN EVERY 7 DAYS 18 mL 2     No current facility-administered medications for this visit.       Past Medical History:   Diagnosis Date    Cluster headaches     Coronary artery disease     s/p stent    Diabetes mellitus     Diabetes mellitus type II     Headache     High cholesterol     Hyperlipidemia     Hypertension     Myocardial infarction      Past Surgical History:   Procedure Laterality Date    COLONOSCOPY N/A 4/1/2021    Procedure: COLONOSCOPY;  Surgeon: Bernard Leach MD;  Location: [facility];  Service: Endoscopy;  Laterality: N/A;  covid test 3/29 lapalco, current smoker, prep instr emailed, 1 visitor policy explained -ml  3/30 pt understands earlier arrival " time and earlier prep time -ml    CORONARY ANGIOPLASTY WITH STENT PLACEMENT      ENDOSCOPIC NASAL SEPTOPLASTY N/A 2020    Procedure: SEPTOPLASTY, NOSE, ENDOSCOPIC;  Surgeon: Clifton Angel MD;  Location: [facility] OR;  Service: ENT;  Laterality: N/A;  DISC LOADED BY VICENTE ON 2020  RN PRE OP 2020 [place]  NEED H/P    FUNCTIONAL ENDOSCOPIC SINUS SURGERY (FESS) USING COMPUTER-ASSISTED NAVIGATION Bilateral 2018    Procedure: SINUS SURGERY FUNCTIONAL ENDOSCOPIC WITH NAVIGATION;  Surgeon: Sina Cortez MD;  Location: [facility] OR;  Service: ENT;  Laterality: Bilateral;    FUNCTIONAL ENDOSCOPIC SINUS SURGERY (FESS) USING COMPUTER-ASSISTED NAVIGATION Bilateral 2020    Procedure: FESS, USING COMPUTER-ASSISTED NAVIGATION;  Surgeon: Clifton Angel MD;  Location: [facility] OR;  Service: ENT;  Laterality: Bilateral;    left shoulder      [place] CABG, ARTERY-VEIN, FOUR  2019    Coronary Artery Bypass, 4    RECONSTRUCTION OF NOSE Bilateral 2018    Procedure: RECONSTRUCTION-NASAL;  Surgeon: Sina Cortez MD;  Location: [facility] OR;  Service: ENT;  Laterality: Bilateral;  MEDTRONIC  RN PREOP 2018    TONSILLECTOMY      TURBINATE RESECTION Bilateral 2018    Procedure: TURBINATE CAUTERY RESECTION WITH DEBRIDER (CRISTINA. MAXILLARY & CRISTINA. ETHMOID);  Surgeon: Sina Cortez MD;  Location: [facility] OR;  Service: ENT;  Laterality: Bilateral;    UVULOPALATOPHARYNGOPLASTY      for REJI     Family History   Problem Relation Age of Onset    Diabetes Mother     Diabetes Father     Heart disease Father     Mental illness Brother         suicide    Cancer Neg Hx         prostate or colon     Social History     Tobacco Use    Smoking status: Current Every Day Smoker     Packs/day: 0.75     Types: Cigarettes     Last attempt to quit: 2019     Years since quittin.9    Smokeless tobacco: Never Used    Tobacco comment:  x 34 yr.  Works at [employer].  Three kids.  Walks a lot at work.    "  Substance Use Topics    Alcohol use: Not Currently     Comment: only on occasion    Drug use: Never        Review of Systems:  Review of Systems   Constitutional: Negative for chills and fever.   HENT: Negative.    Eyes: Negative.    Respiratory: Negative for cough, chest tightness and shortness of breath.    Cardiovascular: Negative.    Gastrointestinal: Negative for abdominal pain, blood in stool, constipation, diarrhea, nausea and vomiting.   Endocrine: Negative for cold intolerance and heat intolerance.   Genitourinary: Negative.    Musculoskeletal: Negative.    Skin: Negative.  Negative for rash.   Neurological: Negative for dizziness, syncope and light-headedness.   Psychiatric/Behavioral: Negative for agitation, confusion and hallucinations.       OBJECTIVE:     Vital Signs (Most Recent)  Pulse: 86 (03/16/22 1115)  BP: 118/83 (03/16/22 1115)  5' 9" (1.753 m)  90 kg (198 lb 6.6 oz)     Physical Exam:  Physical Exam  Constitutional:       General: He is not in acute distress.     Appearance: He is well-developed. He is not diaphoretic.   HENT:      Head: Normocephalic and atraumatic.   Eyes:      Conjunctiva/sclera: Conjunctivae normal.      Pupils: Pupils are equal, round, and reactive to light.   Cardiovascular:      Rate and Rhythm: Normal rate and regular rhythm.      Heart sounds: Normal heart sounds. No murmur heard.    No friction rub. No gallop.   Pulmonary:      Effort: Pulmonary effort is normal. No respiratory distress.      Breath sounds: Normal breath sounds. No stridor. No wheezing.   Abdominal:      General: Bowel sounds are normal. There is no distension.      Palpations: Abdomen is soft.      Tenderness: There is no abdominal tenderness.   Musculoskeletal:         General: Normal range of motion.      Cervical back: Normal range of motion and neck supple.   Skin:     General: Skin is warm and dry.      Findings: No rash.   Neurological:      Mental Status: He is alert and oriented to " person, place, and time.      Cranial Nerves: No cranial nerve deficit.   Psychiatric:         Behavior: Behavior normal.         Diagnostic Results:  CT: Reviewed  reviewed 3/10/22  Impression:     1. Findings concerning for small bowel obstruction, possibly high-grade, involving loops of mid small bowel with transition point in the mid abdomen as detailed above.  Emergent surgical consultation is advised.  2. Calcific atherosclerosis of the coronary vessels, coronary artery atherosclerosis, median sternotomy and coronary artery stents.    ASSESSMENT/PLAN:     61 year old WM w CAD HLD DM2 w recent admission for abdominal pain, CT evidence of partial bowel obstruction    PLAN:Plan     Repeat CT in 3 months  Will d/w patient after imaging.  Call with recurrent pain/abdominal issues.

## 2022-03-24 ENCOUNTER — OFFICE VISIT (OUTPATIENT)
Dept: FAMILY MEDICINE | Facility: CLINIC | Age: 62
End: 2022-03-24
Payer: COMMERCIAL

## 2022-03-24 VITALS
DIASTOLIC BLOOD PRESSURE: 68 MMHG | HEART RATE: 102 BPM | SYSTOLIC BLOOD PRESSURE: 100 MMHG | HEIGHT: 69 IN | WEIGHT: 200.94 LBS | OXYGEN SATURATION: 97 % | BODY MASS INDEX: 29.76 KG/M2 | TEMPERATURE: 98 F

## 2022-03-24 DIAGNOSIS — N18.31 TYPE 2 DIABETES MELLITUS WITH STAGE 3A CHRONIC KIDNEY DISEASE, WITHOUT LONG-TERM CURRENT USE OF INSULIN: Chronic | ICD-10-CM

## 2022-03-24 DIAGNOSIS — E11.22 TYPE 2 DIABETES MELLITUS WITH STAGE 3A CHRONIC KIDNEY DISEASE, WITHOUT LONG-TERM CURRENT USE OF INSULIN: Chronic | ICD-10-CM

## 2022-03-24 DIAGNOSIS — K21.9 GASTROESOPHAGEAL REFLUX DISEASE, UNSPECIFIED WHETHER ESOPHAGITIS PRESENT: ICD-10-CM

## 2022-03-24 DIAGNOSIS — K56.609 SBO (SMALL BOWEL OBSTRUCTION): Primary | ICD-10-CM

## 2022-03-24 DIAGNOSIS — I10 ESSENTIAL HYPERTENSION, BENIGN: Chronic | ICD-10-CM

## 2022-03-24 PROCEDURE — 4010F ACE/ARB THERAPY RXD/TAKEN: CPT | Mod: CPTII,S$GLB,, | Performed by: FAMILY MEDICINE

## 2022-03-24 PROCEDURE — 1111F PR DISCHARGE MEDS RECONCILED W/ CURRENT OUTPATIENT MED LIST: ICD-10-PCS | Mod: CPTII,S$GLB,, | Performed by: FAMILY MEDICINE

## 2022-03-24 PROCEDURE — 1159F MED LIST DOCD IN RCRD: CPT | Mod: CPTII,S$GLB,, | Performed by: FAMILY MEDICINE

## 2022-03-24 PROCEDURE — 3074F SYST BP LT 130 MM HG: CPT | Mod: CPTII,S$GLB,, | Performed by: FAMILY MEDICINE

## 2022-03-24 PROCEDURE — 3008F BODY MASS INDEX DOCD: CPT | Mod: CPTII,S$GLB,, | Performed by: FAMILY MEDICINE

## 2022-03-24 PROCEDURE — 99214 OFFICE O/P EST MOD 30 MIN: CPT | Mod: S$GLB,,, | Performed by: FAMILY MEDICINE

## 2022-03-24 PROCEDURE — 3078F PR MOST RECENT DIASTOLIC BLOOD PRESSURE < 80 MM HG: ICD-10-PCS | Mod: CPTII,S$GLB,, | Performed by: FAMILY MEDICINE

## 2022-03-24 PROCEDURE — 3074F PR MOST RECENT SYSTOLIC BLOOD PRESSURE < 130 MM HG: ICD-10-PCS | Mod: CPTII,S$GLB,, | Performed by: FAMILY MEDICINE

## 2022-03-24 PROCEDURE — 3008F PR BODY MASS INDEX (BMI) DOCUMENTED: ICD-10-PCS | Mod: CPTII,S$GLB,, | Performed by: FAMILY MEDICINE

## 2022-03-24 PROCEDURE — 1111F DSCHRG MED/CURRENT MED MERGE: CPT | Mod: CPTII,S$GLB,, | Performed by: FAMILY MEDICINE

## 2022-03-24 PROCEDURE — 99999 PR PBB SHADOW E&M-EST. PATIENT-LVL V: CPT | Mod: PBBFAC,,, | Performed by: FAMILY MEDICINE

## 2022-03-24 PROCEDURE — 99214 PR OFFICE/OUTPT VISIT, EST, LEVL IV, 30-39 MIN: ICD-10-PCS | Mod: S$GLB,,, | Performed by: FAMILY MEDICINE

## 2022-03-24 PROCEDURE — 4010F PR ACE/ARB THEARPY RXD/TAKEN: ICD-10-PCS | Mod: CPTII,S$GLB,, | Performed by: FAMILY MEDICINE

## 2022-03-24 PROCEDURE — 99999 PR PBB SHADOW E&M-EST. PATIENT-LVL V: ICD-10-PCS | Mod: PBBFAC,,, | Performed by: FAMILY MEDICINE

## 2022-03-24 PROCEDURE — 3078F DIAST BP <80 MM HG: CPT | Mod: CPTII,S$GLB,, | Performed by: FAMILY MEDICINE

## 2022-03-24 PROCEDURE — 1159F PR MEDICATION LIST DOCUMENTED IN MEDICAL RECORD: ICD-10-PCS | Mod: CPTII,S$GLB,, | Performed by: FAMILY MEDICINE

## 2022-03-24 PROCEDURE — 3052F HG A1C>EQUAL 8.0%<EQUAL 9.0%: CPT | Mod: CPTII,S$GLB,, | Performed by: FAMILY MEDICINE

## 2022-03-24 PROCEDURE — 3052F PR MOST RECENT HEMOGLOBIN A1C LEVEL 8.0 - < 9.0%: ICD-10-PCS | Mod: CPTII,S$GLB,, | Performed by: FAMILY MEDICINE

## 2022-03-24 RX ORDER — PANTOPRAZOLE SODIUM 40 MG/1
40 TABLET, DELAYED RELEASE ORAL DAILY PRN
Qty: 30 TABLET | Refills: 1 | Status: SHIPPED | OUTPATIENT
Start: 2022-03-24 | End: 2022-04-13 | Stop reason: SDUPTHER

## 2022-03-24 NOTE — PROGRESS NOTES
[facility]  Progress Note    SUBJECTIVE:     Chief Complaint   Patient presents with    Hospital Follow Up       HPI   Stanley Faustin  is a 61 y.o. male here for hospital follow-up. Was dx with SBO. Doing much better since. Pain has resolved. Diet back to normal. No blood in stool. Also here for follow-up of his chronic conditions. Patient has no other new complaints/problems at this time.      Review of patient's allergies indicates:  No Known Allergies    Past Medical History:   Diagnosis Date    Cluster headaches     Coronary artery disease     s/p stent    Diabetes mellitus     Diabetes mellitus type II     Headache     High cholesterol     Hyperlipidemia     Hypertension     Myocardial infarction      Past Surgical History:   Procedure Laterality Date    COLONOSCOPY N/A 4/1/2021    Procedure: COLONOSCOPY;  Surgeon: Bernard Leach MD;  Location: [facility] ENDO;  Service: Endoscopy;  Laterality: N/A;  covid test 3/29 lapalco, current smoker, prep instr emailed, 1 visitor policy explained -ml  3/30 pt understands earlier arrival time and earlier prep time -ml    CORONARY ANGIOPLASTY WITH STENT PLACEMENT      ENDOSCOPIC NASAL SEPTOPLASTY N/A 2/11/2020    Procedure: SEPTOPLASTY, NOSE, ENDOSCOPIC;  Surgeon: Clifton Angel MD;  Location: [facility] OR;  Service: ENT;  Laterality: N/A;  DISC LOADED BY VICENTE ON 2-5-2020  RN PRE OP 2-7-2020 [place]  NEED H/P    FUNCTIONAL ENDOSCOPIC SINUS SURGERY (FESS) USING COMPUTER-ASSISTED NAVIGATION Bilateral 6/26/2018    Procedure: SINUS SURGERY FUNCTIONAL ENDOSCOPIC WITH NAVIGATION;  Surgeon: Sina Cortez MD;  Location: [facility] OR;  Service: ENT;  Laterality: Bilateral;    FUNCTIONAL ENDOSCOPIC SINUS SURGERY (FESS) USING COMPUTER-ASSISTED NAVIGATION Bilateral 2/11/2020    Procedure: FESS, USING COMPUTER-ASSISTED NAVIGATION;  Surgeon: Clifton Angel MD;  Location: [facility] OR;  Service: ENT;  Laterality: Bilateral;    left shoulder      MI CABG, ARTERY-VEIN, FOUR   2019    Coronary Artery Bypass, 4    RECONSTRUCTION OF NOSE Bilateral 2018    Procedure: RECONSTRUCTION-NASAL;  Surgeon: Sina Cortez MD;  Location: [facility] OR;  Service: ENT;  Laterality: Bilateral;  MEDTRONIC  RN PREOP 2018    TONSILLECTOMY      TURBINATE RESECTION Bilateral 2018    Procedure: TURBINATE CAUTERY RESECTION WITH DEBRIDER (CRISTINA. MAXILLARY & CRISTINA. ETHMOID);  Surgeon: Sina Cortez MD;  Location: [facility] OR;  Service: ENT;  Laterality: Bilateral;    UVULOPALATOPHARYNGOPLASTY      for REJI     Family History   Problem Relation Age of Onset    Diabetes Mother     Diabetes Father     Heart disease Father     Mental illness Brother         suicide    Cancer Neg Hx         prostate or colon     Social History     Tobacco Use    Smoking status: Current Every Day Smoker     Packs/day: 0.75     Types: Cigarettes     Last attempt to quit: 2019     Years since quittin.9    Smokeless tobacco: Never Used    Tobacco comment:  x 34 yr.  Works at [employer].  Three kids.  Walks a lot at work.     Substance Use Topics    Alcohol use: Not Currently     Comment: only on occasion    Drug use: Never        Review of Systems   Constitutional: Negative for chills and fever.   HENT: Negative.    Respiratory: Negative.  Negative for shortness of breath.    Cardiovascular: Negative.  Negative for chest pain.   Gastrointestinal: Positive for heartburn. Negative for abdominal pain, nausea and vomiting.   Genitourinary: Negative.    Neurological: Negative for headaches.   All other systems reviewed and are negative.    OBJECTIVE:     Vitals:    22 1335   BP: 100/68   Pulse: 102   Temp: 98.3 °F (36.8 °C)     Body mass index is 29.68 kg/m².    Physical Exam  Constitutional:       General: He is not in acute distress.     Appearance: He is not diaphoretic.   HENT:      Head: Normocephalic and atraumatic.      Nose: Nose normal.   Eyes:      Conjunctiva/sclera: Conjunctivae  normal.   Cardiovascular:      Rate and Rhythm: Normal rate and regular rhythm.      Heart sounds: Normal heart sounds. No murmur heard.    No friction rub. No gallop.   Pulmonary:      Effort: Pulmonary effort is normal. No respiratory distress.      Breath sounds: Normal breath sounds. No wheezing or rales.   Abdominal:      Palpations: Abdomen is soft.   Skin:     General: Skin is warm.   Neurological:      Mental Status: He is alert and oriented to person, place, and time.         Old records were reviewed. Labs and/or images were independently reviewed.    ASSESSMENT     1. SBO (small bowel obstruction)    2. Essential hypertension, benign    3. Type 2 diabetes mellitus with stage 3a chronic kidney disease, without long-term current use of insulin    4. Gastroesophageal reflux disease, unspecified whether esophagitis present        PLAN:     SBO (small bowel obstruction)   -     Resolved.    Essential hypertension, benign   -     Stable. Continue current regimen.    Type 2 diabetes mellitus with stage 3a chronic kidney disease, without long-term current use of insulin  -     Comprehensive Metabolic Panel; Future  -     CBC Auto Differential; Future  -     Hemoglobin A1C; Future  -     Ambulatory referral/consult to Optometry; Future; Expected date: 03/31/2022  -     Instructed patient to take daily glucose AM logs and to write them down to bring with on next visit. Advised patient to decrease intake of carbohydrates/simple sugars.         Gastroesophageal reflux disease, unspecified whether esophagitis present  -     Start pantoprazole (PROTONIX) 40 MG tablet; Take 1 tablet (40 mg total) by mouth daily as needed.  Dispense: 30 tablet; Refill: 1  -    I have discussed the common side effects of this medication with the patient and answered all of the questions they had at the time of this visit regarding this medication.      RTC KANDY Khan MD  03/24/2022 2:09 PM

## 2022-04-13 ENCOUNTER — PATIENT MESSAGE (OUTPATIENT)
Dept: FAMILY MEDICINE | Facility: CLINIC | Age: 62
End: 2022-04-13
Payer: COMMERCIAL

## 2022-04-13 DIAGNOSIS — K21.9 GASTROESOPHAGEAL REFLUX DISEASE, UNSPECIFIED WHETHER ESOPHAGITIS PRESENT: ICD-10-CM

## 2022-04-13 DIAGNOSIS — Z00.00 ROUTINE PHYSICAL EXAMINATION: Primary | ICD-10-CM

## 2022-04-13 RX ORDER — PANTOPRAZOLE SODIUM 40 MG/1
40 TABLET, DELAYED RELEASE ORAL DAILY PRN
Qty: 90 TABLET | Refills: 2 | Status: SHIPPED | OUTPATIENT
Start: 2022-04-13 | End: 2022-11-14 | Stop reason: SDUPTHER

## 2022-04-13 NOTE — TELEPHONE ENCOUNTER
No new care gaps identified.  Powered by [employer]. Reference number: [id number].   4/13/2022 5:49:25 PM CDT

## 2022-04-14 NOTE — TELEPHONE ENCOUNTER
Refill Routing Note   Medication(s) are not appropriate for processing by [facility] for the following reason(s):      - Required indication for medication not on problem list (gerd)    [facility] action(s):  Defer          Medication reconciliation completed: No     Appointments  past 12m or future 3m with PCP    Date Provider   Last Visit   3/24/2022 Masood Khan MD   Next Visit   4/13/2022 Masood Khan MD   ED visits in past 90 days: 0        Note composed:7:05 PM 04/13/2022

## 2022-04-18 ENCOUNTER — PATIENT MESSAGE (OUTPATIENT)
Dept: FAMILY MEDICINE | Facility: CLINIC | Age: 62
End: 2022-04-18
Payer: COMMERCIAL

## 2022-04-18 RX ORDER — DICLOFENAC SODIUM 10 MG/G
2 GEL TOPICAL 2 TIMES DAILY
Qty: 1 EACH | Refills: 1 | Status: SHIPPED | OUTPATIENT
Start: 2022-04-18 | End: 2022-06-06

## 2022-04-20 ENCOUNTER — LAB VISIT (OUTPATIENT)
Dept: LAB | Facility: HOSPITAL | Age: 62
End: 2022-04-20
Attending: FAMILY MEDICINE
Payer: COMMERCIAL

## 2022-04-20 DIAGNOSIS — Z00.00 ROUTINE PHYSICAL EXAMINATION: ICD-10-CM

## 2022-04-20 LAB
ALBUMIN SERPL BCP-MCNC: 3.9 G/DL (ref 3.5–5.2)
ALP SERPL-CCNC: 69 U/L (ref 55–135)
ALT SERPL W/O P-5'-P-CCNC: 25 U/L (ref 10–44)
ANION GAP SERPL CALC-SCNC: 13 MMOL/L (ref 8–16)
AST SERPL-CCNC: 23 U/L (ref 10–40)
BILIRUB SERPL-MCNC: 0.8 MG/DL (ref 0.1–1)
BUN SERPL-MCNC: 14 MG/DL (ref 6–20)
CALCIUM SERPL-MCNC: 10 MG/DL (ref 8.7–10.5)
CHLORIDE SERPL-SCNC: 102 MMOL/L (ref 95–110)
CO2 SERPL-SCNC: 25 MMOL/L (ref 23–29)
COMPLEXED PSA SERPL-MCNC: 0.44 NG/ML (ref 0–4)
CREAT SERPL-MCNC: 1 MG/DL (ref 0.5–1.4)
EST. GFR  (AFRICAN AMERICAN): >60 ML/MIN/1.73 M^2
EST. GFR  (NON AFRICAN AMERICAN): >60 ML/MIN/1.73 M^2
ESTIMATED AVG GLUCOSE: 189 MG/DL (ref 68–131)
GLUCOSE SERPL-MCNC: 150 MG/DL (ref 70–110)
HBA1C MFR BLD: 8.2 % (ref 4–5.6)
POTASSIUM SERPL-SCNC: 4.5 MMOL/L (ref 3.5–5.1)
PROT SERPL-MCNC: 7 G/DL (ref 6–8.4)
SODIUM SERPL-SCNC: 140 MMOL/L (ref 136–145)
T4 FREE SERPL-MCNC: 0.84 NG/DL (ref 0.71–1.51)
TSH SERPL DL<=0.005 MIU/L-ACNC: 1.39 UIU/ML (ref 0.4–4)

## 2022-04-20 PROCEDURE — 83036 HEMOGLOBIN GLYCOSYLATED A1C: CPT | Performed by: FAMILY MEDICINE

## 2022-04-20 PROCEDURE — 84443 ASSAY THYROID STIM HORMONE: CPT | Performed by: FAMILY MEDICINE

## 2022-04-20 PROCEDURE — 84153 ASSAY OF PSA TOTAL: CPT | Performed by: FAMILY MEDICINE

## 2022-04-20 PROCEDURE — 84439 ASSAY OF FREE THYROXINE: CPT | Performed by: FAMILY MEDICINE

## 2022-04-20 PROCEDURE — 36415 COLL VENOUS BLD VENIPUNCTURE: CPT | Mod: PO | Performed by: FAMILY MEDICINE

## 2022-04-20 PROCEDURE — 80053 COMPREHEN METABOLIC PANEL: CPT | Performed by: FAMILY MEDICINE

## 2022-04-21 ENCOUNTER — TELEPHONE (OUTPATIENT)
Dept: FAMILY MEDICINE | Facility: CLINIC | Age: 62
End: 2022-04-21
Payer: COMMERCIAL

## 2022-04-21 DIAGNOSIS — E11.22 TYPE 2 DIABETES MELLITUS WITH STAGE 3A CHRONIC KIDNEY DISEASE, WITHOUT LONG-TERM CURRENT USE OF INSULIN: Primary | ICD-10-CM

## 2022-04-21 DIAGNOSIS — N18.31 TYPE 2 DIABETES MELLITUS WITH STAGE 3A CHRONIC KIDNEY DISEASE, WITHOUT LONG-TERM CURRENT USE OF INSULIN: Primary | ICD-10-CM

## 2022-04-21 RX ORDER — SEMAGLUTIDE 2.68 MG/ML
2 INJECTION, SOLUTION SUBCUTANEOUS
Qty: 9 ML | Refills: 3 | Status: SHIPPED | OUTPATIENT
Start: 2022-04-21 | End: 2022-05-02 | Stop reason: SDUPTHER

## 2022-04-21 NOTE — TELEPHONE ENCOUNTER
Diabetes still uncontrolled. Increase ozempic to 2 mg each week. Sent new rx to pharmacy. Recheck in 1 month

## 2022-04-22 ENCOUNTER — TELEPHONE (OUTPATIENT)
Dept: FAMILY MEDICINE | Facility: CLINIC | Age: 62
End: 2022-04-22
Payer: COMMERCIAL

## 2022-04-22 NOTE — TELEPHONE ENCOUNTER
----- Message from Leeroy Rader sent at 4/22/2022 12:27 PM CDT -----  Regarding: call back  Type: Patient Call Back    Who called:Stanley     What is the request in detail: the patient is returning a call back to Ms. Jeffries. Please return call at earliest convenience.    Can the clinic reply by [facility]?no     Would the patient rather a call back or a response via [facility]? Call back     Best call back number: [phone]

## 2022-05-17 ENCOUNTER — PATIENT OUTREACH (OUTPATIENT)
Dept: ADMINISTRATIVE | Facility: HOSPITAL | Age: 62
End: 2022-05-17
Payer: COMMERCIAL

## 2022-05-17 RX ORDER — SEMAGLUTIDE 1.34 MG/ML
INJECTION, SOLUTION SUBCUTANEOUS
COMMUNITY
Start: 2022-05-16 | End: 2022-07-08

## 2022-05-23 ENCOUNTER — PATIENT MESSAGE (OUTPATIENT)
Dept: SMOKING CESSATION | Facility: CLINIC | Age: 62
End: 2022-05-23
Payer: COMMERCIAL

## 2022-05-26 DIAGNOSIS — Z95.1 HX OF CABG: ICD-10-CM

## 2022-05-26 NOTE — TELEPHONE ENCOUNTER
No new care gaps identified.  [facility] Embedded Care Gaps. Reference number: [id number]. 5/26/2022   6:09:41 PM CDT

## 2022-05-27 RX ORDER — ATORVASTATIN CALCIUM 40 MG/1
40 TABLET, FILM COATED ORAL DAILY
Qty: 90 TABLET | Refills: 2 | Status: SHIPPED | OUTPATIENT
Start: 2022-05-27 | End: 2022-08-18 | Stop reason: SDUPTHER

## 2022-05-27 NOTE — TELEPHONE ENCOUNTER
Refill Routing Note   Medication(s) are not appropriate for processing by [facility] for the following reason(s):      - Indication is outside of scope for [facility]    [facility] action(s):  Route          Medication reconciliation completed: No     Appointments  past 12m or future 3m with PCP    Date Provider   Last Visit   3/24/2022 Masood Khan MD   Next Visit   Visit date not found Masood Khan MD   ED visits in past 90 days: 0        Note composed:9:19 AM 05/27/2022

## 2022-07-05 DIAGNOSIS — Z79.4 TYPE 2 DIABETES MELLITUS WITH MILD NONPROLIFERATIVE RETINOPATHY, WITH LONG-TERM CURRENT USE OF INSULIN, MACULAR EDEMA PRESENCE UNSPECIFIED, UNSPECIFIED LATERALITY: Chronic | ICD-10-CM

## 2022-07-05 DIAGNOSIS — E11.3299 TYPE 2 DIABETES MELLITUS WITH MILD NONPROLIFERATIVE RETINOPATHY, WITH LONG-TERM CURRENT USE OF INSULIN, MACULAR EDEMA PRESENCE UNSPECIFIED, UNSPECIFIED LATERALITY: Chronic | ICD-10-CM

## 2022-07-05 RX ORDER — NATEGLINIDE 120 MG/1
120 TABLET ORAL
Qty: 90 TABLET | Refills: 3 | Status: SHIPPED | OUTPATIENT
Start: 2022-07-05 | End: 2022-10-03

## 2022-07-05 RX ORDER — PEN NEEDLE, DIABETIC 30 GX3/16"
1 NEEDLE, DISPOSABLE MISCELLANEOUS 3 TIMES DAILY
Qty: 200 EACH | Refills: 3 | Status: SHIPPED | OUTPATIENT
Start: 2022-07-05 | End: 2022-08-18 | Stop reason: SDUPTHER

## 2022-07-05 NOTE — TELEPHONE ENCOUNTER
No new care gaps identified.  [facility] Embedded Care Gaps. Reference number: [id number]. 7/05/2022   12:07:46 PM CDT

## 2022-07-05 NOTE — TELEPHONE ENCOUNTER
No new care gaps identified.  [facility] Embedded Care Gaps. Reference number: [id number]. 7/05/2022   8:11:16 AM CDT

## 2022-07-06 RX ORDER — INSULIN DEGLUDEC 100 U/ML
INJECTION, SOLUTION SUBCUTANEOUS
Qty: 4 PEN | Refills: 3 | OUTPATIENT
Start: 2022-07-06

## 2022-07-06 NOTE — TELEPHONE ENCOUNTER
Quick DC. Inappropriate Request    Refill Authorization Note   Stanley Faustin  is requesting a refill authorization.  Brief Assessment and Rationale for Refill:  Quick Discontinue  Medication Therapy Plan:  35 units subq qpm starting 2/18/22    Medication Reconciliation Completed:  No    Medication-related problems identified: Dose adjustment   Comments:     Note composed:3:17 PM 07/06/2022

## 2022-08-05 ENCOUNTER — PATIENT OUTREACH (OUTPATIENT)
Dept: ADMINISTRATIVE | Facility: HOSPITAL | Age: 62
End: 2022-08-05
Payer: COMMERCIAL

## 2022-08-05 DIAGNOSIS — E11.9 TYPE 2 DIABETES MELLITUS WITHOUT COMPLICATION, UNSPECIFIED WHETHER LONG TERM INSULIN USE: Primary | ICD-10-CM

## 2022-08-09 ENCOUNTER — LAB VISIT (OUTPATIENT)
Dept: LAB | Facility: HOSPITAL | Age: 62
End: 2022-08-09
Attending: FAMILY MEDICINE
Payer: COMMERCIAL

## 2022-08-09 DIAGNOSIS — N18.31 TYPE 2 DIABETES MELLITUS WITH STAGE 3A CHRONIC KIDNEY DISEASE, WITHOUT LONG-TERM CURRENT USE OF INSULIN: Chronic | ICD-10-CM

## 2022-08-09 DIAGNOSIS — E11.22 TYPE 2 DIABETES MELLITUS WITH STAGE 3A CHRONIC KIDNEY DISEASE, WITHOUT LONG-TERM CURRENT USE OF INSULIN: Chronic | ICD-10-CM

## 2022-08-09 LAB
ALBUMIN SERPL BCP-MCNC: 3.8 G/DL (ref 3.5–5.2)
ALP SERPL-CCNC: 58 U/L (ref 55–135)
ALT SERPL W/O P-5'-P-CCNC: 23 U/L (ref 10–44)
ANION GAP SERPL CALC-SCNC: 8 MMOL/L (ref 8–16)
AST SERPL-CCNC: 20 U/L (ref 10–40)
BASOPHILS # BLD AUTO: 0.05 K/UL (ref 0–0.2)
BASOPHILS NFR BLD: 0.7 % (ref 0–1.9)
BILIRUB SERPL-MCNC: 1 MG/DL (ref 0.1–1)
BUN SERPL-MCNC: 14 MG/DL (ref 6–20)
CALCIUM SERPL-MCNC: 9.6 MG/DL (ref 8.7–10.5)
CHLORIDE SERPL-SCNC: 104 MMOL/L (ref 95–110)
CO2 SERPL-SCNC: 26 MMOL/L (ref 23–29)
CREAT SERPL-MCNC: 0.9 MG/DL (ref 0.5–1.4)
DIFFERENTIAL METHOD: NORMAL
EOSINOPHIL # BLD AUTO: 0.2 K/UL (ref 0–0.5)
EOSINOPHIL NFR BLD: 2.5 % (ref 0–8)
ERYTHROCYTE [DISTWIDTH] IN BLOOD BY AUTOMATED COUNT: 13.5 % (ref 11.5–14.5)
EST. GFR  (NO RACE VARIABLE): >60 ML/MIN/1.73 M^2
ESTIMATED AVG GLUCOSE: 163 MG/DL (ref 68–131)
GLUCOSE SERPL-MCNC: 134 MG/DL (ref 70–110)
HBA1C MFR BLD: 7.3 % (ref 4–5.6)
HCT VFR BLD AUTO: 47.2 % (ref 40–54)
HGB BLD-MCNC: 15.5 G/DL (ref 14–18)
IMM GRANULOCYTES # BLD AUTO: 0.01 K/UL (ref 0–0.04)
IMM GRANULOCYTES NFR BLD AUTO: 0.1 % (ref 0–0.5)
LYMPHOCYTES # BLD AUTO: 2.1 K/UL (ref 1–4.8)
LYMPHOCYTES NFR BLD: 30.3 % (ref 18–48)
MCH RBC QN AUTO: 30.1 PG (ref 27–31)
MCHC RBC AUTO-ENTMCNC: 32.8 G/DL (ref 32–36)
MCV RBC AUTO: 92 FL (ref 82–98)
MONOCYTES # BLD AUTO: 0.4 K/UL (ref 0.3–1)
MONOCYTES NFR BLD: 6.5 % (ref 4–15)
NEUTROPHILS # BLD AUTO: 4.1 K/UL (ref 1.8–7.7)
NEUTROPHILS NFR BLD: 59.9 % (ref 38–73)
NRBC BLD-RTO: 0 /100 WBC
PLATELET # BLD AUTO: 216 K/UL (ref 150–450)
PMV BLD AUTO: 10.4 FL (ref 9.2–12.9)
POTASSIUM SERPL-SCNC: 4.6 MMOL/L (ref 3.5–5.1)
PROT SERPL-MCNC: 6.9 G/DL (ref 6–8.4)
RBC # BLD AUTO: 5.15 M/UL (ref 4.6–6.2)
SODIUM SERPL-SCNC: 138 MMOL/L (ref 136–145)
WBC # BLD AUTO: 6.77 K/UL (ref 3.9–12.7)

## 2022-08-09 PROCEDURE — 83036 HEMOGLOBIN GLYCOSYLATED A1C: CPT | Performed by: FAMILY MEDICINE

## 2022-08-09 PROCEDURE — 85025 COMPLETE CBC W/AUTO DIFF WBC: CPT | Performed by: FAMILY MEDICINE

## 2022-08-09 PROCEDURE — 80053 COMPREHEN METABOLIC PANEL: CPT | Performed by: FAMILY MEDICINE

## 2022-08-09 PROCEDURE — 36415 COLL VENOUS BLD VENIPUNCTURE: CPT | Mod: PO | Performed by: FAMILY MEDICINE

## 2022-08-16 DIAGNOSIS — E11.3299 TYPE 2 DIABETES MELLITUS WITH MILD NONPROLIFERATIVE RETINOPATHY, WITH LONG-TERM CURRENT USE OF INSULIN, MACULAR EDEMA PRESENCE UNSPECIFIED, UNSPECIFIED LATERALITY: Chronic | ICD-10-CM

## 2022-08-16 DIAGNOSIS — Z79.4 TYPE 2 DIABETES MELLITUS WITH MILD NONPROLIFERATIVE RETINOPATHY, WITH LONG-TERM CURRENT USE OF INSULIN, MACULAR EDEMA PRESENCE UNSPECIFIED, UNSPECIFIED LATERALITY: Chronic | ICD-10-CM

## 2022-08-16 DIAGNOSIS — Z95.1 HX OF CABG: ICD-10-CM

## 2022-08-16 DIAGNOSIS — I10 ESSENTIAL HYPERTENSION, BENIGN: Chronic | ICD-10-CM

## 2022-08-16 RX ORDER — LISINOPRIL 5 MG/1
TABLET ORAL
Refills: 0 | OUTPATIENT
Start: 2022-08-16

## 2022-08-16 RX ORDER — NATEGLINIDE 120 MG/1
TABLET ORAL
Refills: 0 | OUTPATIENT
Start: 2022-08-16

## 2022-08-16 RX ORDER — INSULIN DEGLUDEC 100 U/ML
INJECTION, SOLUTION SUBCUTANEOUS
Refills: 0 | OUTPATIENT
Start: 2022-08-16

## 2022-08-16 RX ORDER — PEN NEEDLE, DIABETIC 30 GX3/16"
NEEDLE, DISPOSABLE MISCELLANEOUS
Refills: 0 | OUTPATIENT
Start: 2022-08-16

## 2022-08-16 RX ORDER — METOPROLOL TARTRATE 25 MG/1
TABLET, FILM COATED ORAL
Refills: 0 | OUTPATIENT
Start: 2022-08-16

## 2022-08-16 RX ORDER — ATORVASTATIN CALCIUM 40 MG/1
TABLET, FILM COATED ORAL
Refills: 0 | OUTPATIENT
Start: 2022-08-16

## 2022-08-16 RX ORDER — METFORMIN HYDROCHLORIDE 1000 MG/1
TABLET ORAL
Refills: 0 | OUTPATIENT
Start: 2022-08-16

## 2022-08-16 NOTE — TELEPHONE ENCOUNTER
No new care gaps identified.  [facility] Embedded Care Gaps. Reference number: [id number]. 8/16/2022   12:37:08 PM CDT

## 2022-08-16 NOTE — TELEPHONE ENCOUNTER
Refill Decision Note   Stanley Faustin  is requesting a refill authorization.  Brief Assessment and Rationale for Refill:  Quick Discontinue     Medication Therapy Plan:    Pharmacy is requesting new scripts for the following medications without required information, (sig/ frequency/qty/etc)      Medication Reconciliation Completed: No     Comments: Pharmacies have been requesting medications for patients without required information, (sig, frequency, qty, etc.). In addition, requests are sent for medication(s) pt. are currently not taking, and medications patients have never taken.    We have spoken to the pharmacies about these request types and advised their teams previously that we are unable to assess these New Script requests and require all details for these requests. This is a known issue and has been reported.     Note composed:2:56 PM 08/16/2022

## 2022-08-16 NOTE — TELEPHONE ENCOUNTER
[facility] Note  Quick DC. Inappropriate Request   Refill request requires further review by MD: NO   Medication Therapy Plan: Pharmacy is requesting new script(s) for the following medications without required information, (sig/ frequency/qty/etc)     [facility] action(s):  Quick Discontinue      Duplicate Pended Encounter(s)/ Last Prescribed Details:    Pharmacies have been requesting medications for patients without required information, (sig, frequency, qty, etc.). In addition, requests are sent for medication(s) pt. are currently not taking, and medications patients have never taken.    We have spoken to the pharmacies about these request types and advised their teams previously that we are unable to assess these New Script requests and require all details for these requests. This is a known issue and has been reported.        Medication related problems are not assessed for [facility].   Medication Reconciliation Completed? NO Were there pending details that required adjustment? NO     Automatic Epic Generated Protocol Data Below:   Requested Prescriptions   Pending Prescriptions Disp Refills    metFORMIN (GLUCOPHAGE) 1000 MG tablet [Pharmacy Med Name: METFORMIN HCL TABS 1000MG]  0              Appointments      Date Provider   Last Visit   3/24/2022 Masood Khan MD   Next Visit   8/16/2022 Masood Khan MD        Note composed:2:11 PM 08/16/2022

## 2022-08-16 NOTE — TELEPHONE ENCOUNTER
[facility] Note  Quick DC. Inappropriate Request   Refill request requires further review by MD: NO   Medication Therapy Plan: Pharmacy is requesting new script(s) for the following medications without required information, (sig/ frequency/qty/etc)     [facility] action(s):  Quick Discontinue      Duplicate Pended Encounter(s)/ Last Prescribed Details:    Pharmacies have been requesting medications for patients without required information, (sig, frequency, qty, etc.). In addition, requests are sent for medication(s) pt. are currently not taking, and medications patients have never taken.    We have spoken to the pharmacies about these request types and advised their teams previously that we are unable to assess these New Script requests and require all details for these requests. This is a known issue and has been reported.        Medication related problems are not assessed for [facility].   Medication Reconciliation Completed? NO Were there pending details that required adjustment? NO     Automatic Epic Generated Protocol Data Below:   Requested Prescriptions   Pending Prescriptions Disp Refills    atorvastatin (LIPITOR) 40 MG tablet [Pharmacy Med Name: ATORVASTATIN TABS 40MG]  0              Appointments      Date Provider   Last Visit   Visit date not found Pradip Andino MD   Next Visit   Visit date not found Pradip Andino MD        Note composed:2:11 PM 08/16/2022

## 2022-08-16 NOTE — TELEPHONE ENCOUNTER
"[facility] Note  Quick DC. Inappropriate Request   Refill request requires further review by MD: NO   Medication Therapy Plan: Pharmacy is requesting new script(s) for the following medications without required information, (sig/ frequency/qty/etc)     [facility] action(s):  Quick Discontinue      Duplicate Pended Encounter(s)/ Last Prescribed Details:    Pharmacies have been requesting medications for patients without required information, (sig, frequency, qty, etc.). In addition, requests are sent for medication(s) pt. are currently not taking, and medications patients have never taken.    We have spoken to the pharmacies about these request types and advised their teams previously that we are unable to assess these New Script requests and require all details for these requests. This is a known issue and has been reported.        Medication related problems are not assessed for QDC.   Medication Reconciliation Completed? NO Were there pending details that required adjustment? NO     Automatic Epic Generated Protocol Data Below:   Requested Prescriptions   Pending Prescriptions Disp Refills    pen needle, diabetic (BD VIRGILIO 2ND GEN PEN NEEDLE) 32 gauge x 5/32" Ndle [Pharmacy Med Name: BD PEN KRISTY VIRGILIO 2GEN 4MM 100S 32G5/32]  0              Appointments      Date Provider   Last Visit   3/24/2022 Masood Khan MD   Next Visit   8/16/2022 Masood Khan MD        Note composed:2:11 PM 08/16/2022            "

## 2022-08-16 NOTE — TELEPHONE ENCOUNTER
No new care gaps identified.  [facility] Embedded Care Gaps. Reference number: [id number]. 8/16/2022   12:33:19 PM CDT

## 2022-08-16 NOTE — TELEPHONE ENCOUNTER
No new care gaps identified.  [facility] Embedded Care Gaps. Reference number: [id number]. 8/16/2022   12:32:47 PM CDT

## 2022-08-16 NOTE — TELEPHONE ENCOUNTER
No new care gaps identified.  [facility] Embedded Care Gaps. Reference number: [id number]. 8/16/2022   12:50:05 PM CDT

## 2022-08-16 NOTE — TELEPHONE ENCOUNTER
No new care gaps identified.  [facility] Embedded Care Gaps. Reference number: [id number]. 8/16/2022   12:33:57 PM CDT

## 2022-08-16 NOTE — TELEPHONE ENCOUNTER
[facility] Note  Quick DC. Inappropriate Request   Refill request requires further review by MD: NO   Medication Therapy Plan: Pharmacy is requesting new script(s) for the following medications without required information, (sig/ frequency/qty/etc)     [facility] action(s):  Quick Discontinue      Duplicate Pended Encounter(s)/ Last Prescribed Details:    Pharmacies have been requesting medications for patients without required information, (sig, frequency, qty, etc.). In addition, requests are sent for medication(s) pt. are currently not taking, and medications patients have never taken.    We have spoken to the pharmacies about these request types and advised their teams previously that we are unable to assess these New Script requests and require all details for these requests. This is a known issue and has been reported.        Medication related problems are not assessed for QDC.   Medication Reconciliation Completed? NO Were there pending details that required adjustment? NO     Automatic Epic Generated Protocol Data Below:   Requested Prescriptions   Pending Prescriptions Disp Refills    metoprolol tartrate (LOPRESSOR) 25 MG tablet [Pharmacy Med Name: METOPROLOL TARTRATE TABS 25MG]  0              Appointments      Date Provider   Last Visit   3/24/2022 Masood Khan MD   Next Visit   8/16/2022 Masood Khan MD        Note composed:2:11 PM 08/16/2022

## 2022-08-16 NOTE — TELEPHONE ENCOUNTER
Refill Decision Note   Stanley Faustin  is requesting a refill authorization.  Brief Assessment and Rationale for Refill:  Quick Discontinue     Medication Therapy Plan:    Pharmacy is requesting new scripts for the following medications without required information, (sig/ frequency/qty/etc)      Medication Reconciliation Completed: No     Comments: Pharmacies have been requesting medications for patients without required information, (sig, frequency, qty, etc.). In addition, requests are sent for medication(s) pt. are currently not taking, and medications patients have never taken.    We have spoken to the pharmacies about these request types and advised their teams previously that we are unable to assess these New Script requests and require all details for these requests. This is a known issue and has been reported.     Note composed:2:55 PM 08/16/2022

## 2022-08-16 NOTE — TELEPHONE ENCOUNTER
Problem: Pneumonia  Goal: S/S of acute pneumonia are resolved  Description: If acute pneumonia is present, monitor for resolution of fever, cough, secretions and other test values based on presentation.  Outcome: Outcome Met, Continue evaluating goal progress toward completion  Goal: Verbalizes understanding of pneumonia, treatment, and ongoing prevention  Description: Document on Patient Education Activity  Outcome: Outcome Met, Continue evaluating goal progress toward completion     Problem: VTE, Risk for  Goal: # No s/s of VTE  Outcome: Outcome Met, Continue evaluating goal progress toward completion  Goal: # Verbalizes understanding of VTE risk factors and prevention  Description: Document education using the patient education activity.   Outcome: Outcome Met, Continue evaluating goal progress toward completion  Goal: Demonstrates ability to administer injectable anticoagulants if ordered for d/c  Description: Document education using the patient education activity.  Outcome: Outcome Met, Continue evaluating goal progress toward completion     Problem: At Risk for Falls  Goal: # Patient does not fall  Outcome: Outcome Met, Continue evaluating goal progress toward completion  Goal: # Takes action to control fall-related risks  Outcome: Outcome Met, Continue evaluating goal progress toward completion  Goal: # Verbalizes understanding of fall risk/precautions  Description: Document education using the patient education activity  Outcome: Outcome Met, Continue evaluating goal progress toward completion     Problem: At Risk for Injury Due to Fall  Goal: # Patient does not fall  Outcome: Outcome Met, Continue evaluating goal progress toward completion  Goal: # Takes action to control condition specific risks  Outcome: Outcome Met, Continue evaluating goal progress toward completion  Goal: # Verbalizes understanding of fall-related injury personal risks  Description: Document education using the patient education  No new care gaps identified.  [facility] Embedded Care Gaps. Reference number: [id number]. 8/16/2022   12:41:29 PM CDT   activity  Outcome: Outcome Met, Continue evaluating goal progress toward completion     Problem: Pain  Goal: #Acceptable pain level achieved/maintained at rest using NRS/Faces  Description: This goal is used for patients who can self-report.  Acceptable means the level is at or below the identified comfort/function goal.  Outcome: Outcome Met, Continue evaluating goal progress toward completion  Goal: # Acceptable pain level achieved/maintained at rest using NRS/Faces without oversedation (opioid naive or PCA/Epidural infusion)  Description: This goal is used if Opioid-naïve or on PCA/Epidural Infusion.  Outcome: Outcome Met, Continue evaluating goal progress toward completion  Goal: # Acceptable pain level achieved/maintained with activity using NRS/Faces  Description: This goal is used for patients who can self-report and are not achieving acceptable pain control during activity.  Outcome: Outcome Met, Continue evaluating goal progress toward completion  Goal: Acceptable pain/comfort level is achieved/maintained at rest (based on Pain Behaviors Scale)  Description: This goal is used for patients who are not able to self-report pain and are assessed for pain using the Pain Behaviors Scale  Outcome: Outcome Met, Continue evaluating goal progress toward completion  Goal: Acceptable pain/comfort level is achieved/maintained at rest based on PAINAID scale (Dementia)  Description: This goal is used for patients who are not able to self-report pain, have dementia, and assessed using the PAINAD scale.  Outcome: Outcome Met, Continue evaluating goal progress toward completion  Goal: Acceptable pain/comfort level is achieved/maintained at rest (based on pediatric behavior tool: NIPS, NPASS, or FLACC)  Description: This goal is used for pediatric patients who are not able to self report pain.  Outcome: Outcome Met, Continue evaluating goal progress toward completion  Goal: # Verbalizes understanding of pain  management  Description: Documented in Patient Education Activity  Outcome: Outcome Met, Continue evaluating goal progress toward completion  Goal: Verbalizes understanding and effective use of Patient Controlled Analgesia (PCA)  Description: Documented in Patient Education Activity  This goal is used for patients with PCA  Outcome: Outcome Met, Continue evaluating goal progress toward completion  Goal: Maximum comfort achieved/maintained at end of life (Hospice)  Outcome: Outcome Met, Continue evaluating goal progress toward completion

## 2022-08-16 NOTE — TELEPHONE ENCOUNTER
[facility] Note  Quick DC. Inappropriate Request   Refill request requires further review by MD: NO   Medication Therapy Plan: Pharmacy is requesting new script(s) for the following medications without required information, (sig/ frequency/qty/etc)     [facility] action(s):  Quick Discontinue      Duplicate Pended Encounter(s)/ Last Prescribed Details:    Pharmacies have been requesting medications for patients without required information, (sig, frequency, qty, etc.). In addition, requests are sent for medication(s) pt. are currently not taking, and medications patients have never taken.    We have spoken to the pharmacies about these request types and advised their teams previously that we are unable to assess these New Script requests and require all details for these requests. This is a known issue and has been reported.        Medication related problems are not assessed for QDC.   Medication Reconciliation Completed? NO Were there pending details that required adjustment? NO     Automatic Epic Generated Protocol Data Below:   Requested Prescriptions   Pending Prescriptions Disp Refills    lisinopriL (PRINIVIL,ZESTRIL) 5 MG tablet [Pharmacy Med Name: LISINOPRIL TABS 5MG]  0              Appointments      Date Provider   Last Visit   3/24/2022 Masood Khan MD   Next Visit   8/16/2022 Masood Khan MD        Note composed:2:11 PM 08/16/2022

## 2022-08-16 NOTE — TELEPHONE ENCOUNTER
No new care gaps identified.  [facility] Embedded Care Gaps. Reference number: [id number]. 8/16/2022   12:35:03 PM CDT

## 2022-08-18 DIAGNOSIS — Z95.1 HX OF CABG: ICD-10-CM

## 2022-08-18 RX ORDER — PEN NEEDLE, DIABETIC 30 GX3/16"
1 NEEDLE, DISPOSABLE MISCELLANEOUS 3 TIMES DAILY
Qty: 200 EACH | Refills: 3 | Status: SHIPPED | OUTPATIENT
Start: 2022-08-18

## 2022-08-18 RX ORDER — ATORVASTATIN CALCIUM 40 MG/1
40 TABLET, FILM COATED ORAL DAILY
Qty: 90 TABLET | Refills: 2 | Status: SHIPPED | OUTPATIENT
Start: 2022-08-18 | End: 2022-11-02

## 2022-08-18 NOTE — TELEPHONE ENCOUNTER
No new care gaps identified.  [facility] Embedded Care Gaps. Reference number: [id number]. 8/18/2022   12:05:44 PM CDT

## 2022-08-18 NOTE — TELEPHONE ENCOUNTER
"----- Message from Diane Ruiz sent at 8/18/2022 11:59 AM CDT -----  .Type: RX Refill Request    Who Called: [facility]    Have you contacted your pharmacy:no    Refill or New Rx:refill    RX Name and Strength:  1.atorvastatin (LIPITOR) 40 MG tablet  2. pen needle, diabetic (BD ULTRA-FINE VIRGILIO PEN NEEDLE) 32 gauge x 5/32" Ndle    Preferred Pharmacy with phone number:  [facility] - [address]   Phone: [phone]  Fax:  [phone]      Local or Mail Order:local    Would the patient rather a call back or a response via [facility]? call    Best Call Back Number:.[phone] (home)           "

## 2022-08-23 ENCOUNTER — OFFICE VISIT (OUTPATIENT)
Dept: FAMILY MEDICINE | Facility: CLINIC | Age: 62
End: 2022-08-23
Payer: COMMERCIAL

## 2022-08-23 VITALS
SYSTOLIC BLOOD PRESSURE: 110 MMHG | WEIGHT: 195.69 LBS | HEART RATE: 94 BPM | OXYGEN SATURATION: 96 % | TEMPERATURE: 99 F | BODY MASS INDEX: 28.98 KG/M2 | HEIGHT: 69 IN | DIASTOLIC BLOOD PRESSURE: 80 MMHG

## 2022-08-23 DIAGNOSIS — Z79.4 TYPE 2 DIABETES MELLITUS WITH MILD NONPROLIFERATIVE RETINOPATHY, WITH LONG-TERM CURRENT USE OF INSULIN, MACULAR EDEMA PRESENCE UNSPECIFIED, UNSPECIFIED LATERALITY: Chronic | ICD-10-CM

## 2022-08-23 DIAGNOSIS — E78.00 HYPERCHOLESTEROLEMIA: Chronic | ICD-10-CM

## 2022-08-23 DIAGNOSIS — Z72.0 TOBACCO ABUSE: ICD-10-CM

## 2022-08-23 DIAGNOSIS — E11.3299 TYPE 2 DIABETES MELLITUS WITH MILD NONPROLIFERATIVE RETINOPATHY, WITH LONG-TERM CURRENT USE OF INSULIN, MACULAR EDEMA PRESENCE UNSPECIFIED, UNSPECIFIED LATERALITY: Chronic | ICD-10-CM

## 2022-08-23 DIAGNOSIS — I10 ESSENTIAL HYPERTENSION, BENIGN: Chronic | ICD-10-CM

## 2022-08-23 DIAGNOSIS — E11.22 TYPE 2 DIABETES MELLITUS WITH STAGE 3A CHRONIC KIDNEY DISEASE, WITHOUT LONG-TERM CURRENT USE OF INSULIN: Primary | ICD-10-CM

## 2022-08-23 DIAGNOSIS — N18.31 TYPE 2 DIABETES MELLITUS WITH STAGE 3A CHRONIC KIDNEY DISEASE, WITHOUT LONG-TERM CURRENT USE OF INSULIN: Primary | ICD-10-CM

## 2022-08-23 PROBLEM — K56.609 SBO (SMALL BOWEL OBSTRUCTION): Status: RESOLVED | Noted: 2022-03-10 | Resolved: 2022-08-23

## 2022-08-23 PROCEDURE — 3051F HG A1C>EQUAL 7.0%<8.0%: CPT | Mod: CPTII,S$GLB,, | Performed by: FAMILY MEDICINE

## 2022-08-23 PROCEDURE — 3008F BODY MASS INDEX DOCD: CPT | Mod: CPTII,S$GLB,, | Performed by: FAMILY MEDICINE

## 2022-08-23 PROCEDURE — 1159F PR MEDICATION LIST DOCUMENTED IN MEDICAL RECORD: ICD-10-PCS | Mod: CPTII,S$GLB,, | Performed by: FAMILY MEDICINE

## 2022-08-23 PROCEDURE — 4010F PR ACE/ARB THEARPY RXD/TAKEN: ICD-10-PCS | Mod: CPTII,S$GLB,, | Performed by: FAMILY MEDICINE

## 2022-08-23 PROCEDURE — 3074F SYST BP LT 130 MM HG: CPT | Mod: CPTII,S$GLB,, | Performed by: FAMILY MEDICINE

## 2022-08-23 PROCEDURE — 3079F PR MOST RECENT DIASTOLIC BLOOD PRESSURE 80-89 MM HG: ICD-10-PCS | Mod: CPTII,S$GLB,, | Performed by: FAMILY MEDICINE

## 2022-08-23 PROCEDURE — 3008F PR BODY MASS INDEX (BMI) DOCUMENTED: ICD-10-PCS | Mod: CPTII,S$GLB,, | Performed by: FAMILY MEDICINE

## 2022-08-23 PROCEDURE — 99214 OFFICE O/P EST MOD 30 MIN: CPT | Mod: S$GLB,,, | Performed by: FAMILY MEDICINE

## 2022-08-23 PROCEDURE — 1159F MED LIST DOCD IN RCRD: CPT | Mod: CPTII,S$GLB,, | Performed by: FAMILY MEDICINE

## 2022-08-23 PROCEDURE — 3051F PR MOST RECENT HEMOGLOBIN A1C LEVEL 7.0 - < 8.0%: ICD-10-PCS | Mod: CPTII,S$GLB,, | Performed by: FAMILY MEDICINE

## 2022-08-23 PROCEDURE — 3079F DIAST BP 80-89 MM HG: CPT | Mod: CPTII,S$GLB,, | Performed by: FAMILY MEDICINE

## 2022-08-23 PROCEDURE — 99214 PR OFFICE/OUTPT VISIT, EST, LEVL IV, 30-39 MIN: ICD-10-PCS | Mod: S$GLB,,, | Performed by: FAMILY MEDICINE

## 2022-08-23 PROCEDURE — 99999 PR PBB SHADOW E&M-EST. PATIENT-LVL IV: CPT | Mod: PBBFAC,,, | Performed by: FAMILY MEDICINE

## 2022-08-23 PROCEDURE — 4010F ACE/ARB THERAPY RXD/TAKEN: CPT | Mod: CPTII,S$GLB,, | Performed by: FAMILY MEDICINE

## 2022-08-23 PROCEDURE — 3074F PR MOST RECENT SYSTOLIC BLOOD PRESSURE < 130 MM HG: ICD-10-PCS | Mod: CPTII,S$GLB,, | Performed by: FAMILY MEDICINE

## 2022-08-23 PROCEDURE — 99999 PR PBB SHADOW E&M-EST. PATIENT-LVL IV: ICD-10-PCS | Mod: PBBFAC,,, | Performed by: FAMILY MEDICINE

## 2022-08-23 RX ORDER — SEMAGLUTIDE 2.68 MG/ML
2 INJECTION, SOLUTION SUBCUTANEOUS
Qty: 9 ML | Refills: 3 | Status: ON HOLD | OUTPATIENT
Start: 2022-08-23 | End: 2022-10-17 | Stop reason: SDUPTHER

## 2022-08-23 RX ORDER — BUPROPION HYDROCHLORIDE 150 MG/1
150 TABLET ORAL 2 TIMES DAILY
Qty: 60 TABLET | Refills: 0 | Status: SHIPPED | OUTPATIENT
Start: 2022-08-23 | End: 2022-09-01

## 2022-08-23 RX ORDER — BUPROPION HYDROCHLORIDE 150 MG/1
150 TABLET ORAL 2 TIMES DAILY
Qty: 30 TABLET | Refills: 0 | Status: SHIPPED | OUTPATIENT
Start: 2022-08-23 | End: 2022-08-23 | Stop reason: SDUPTHER

## 2022-08-23 NOTE — PROGRESS NOTES
[facility]  Progress Note    SUBJECTIVE:     Chief Complaint   Patient presents with    Follow-up       HPI   Stanley Faustin  is a 62 y.o. male here for follow-up of his diabetes. Doing well on his current regimen. Hasn't been able to get any ozempic due to backorder. Patient has no other new complaints/problems at this time.      Review of patient's allergies indicates:  No Known Allergies    Past Medical History:   Diagnosis Date    Cluster headaches     Coronary artery disease     s/p stent    Diabetes mellitus     Diabetes mellitus type II     Headache     High cholesterol     Hyperlipidemia     Hypertension     Myocardial infarction      Past Surgical History:   Procedure Laterality Date    COLONOSCOPY N/A 4/1/2021    Procedure: COLONOSCOPY;  Surgeon: Bernard Leach MD;  Location: [facility] ENDO;  Service: Endoscopy;  Laterality: N/A;  covid test 3/29 lapalco, current smoker, prep instr emailed, 1 visitor policy explained -ml  3/30 pt understands earlier arrival time and earlier prep time -ml    CORONARY ANGIOPLASTY WITH STENT PLACEMENT      ENDOSCOPIC NASAL SEPTOPLASTY N/A 2/11/2020    Procedure: SEPTOPLASTY, NOSE, ENDOSCOPIC;  Surgeon: Clifton Angel MD;  Location: [facility] OR;  Service: ENT;  Laterality: N/A;  DISC LOADED BY [facility] ON 2-5-2020  RN PRE OP 2-7-2020 [place]  NEED H/P    FUNCTIONAL ENDOSCOPIC SINUS SURGERY (FESS) USING COMPUTER-ASSISTED NAVIGATION Bilateral 6/26/2018    Procedure: SINUS SURGERY FUNCTIONAL ENDOSCOPIC WITH NAVIGATION;  Surgeon: Sina Cortez MD;  Location: [facility] OR;  Service: ENT;  Laterality: Bilateral;    FUNCTIONAL ENDOSCOPIC SINUS SURGERY (FESS) USING COMPUTER-ASSISTED NAVIGATION Bilateral 2/11/2020    Procedure: FESS, USING COMPUTER-ASSISTED NAVIGATION;  Surgeon: Clifton Angel MD;  Location: [facility] OR;  Service: ENT;  Laterality: Bilateral;    left shoulder      [place] CABG, ARTERY-VEIN, FOUR  04/12/2019    Coronary Artery Bypass, 4    RECONSTRUCTION OF  NOSE Bilateral 6/26/2018    Procedure: RECONSTRUCTION-NASAL;  Surgeon: Sina Cortez MD;  Location: [facility] OR;  Service: ENT;  Laterality: Bilateral;  [employer]  RN PREOP 6/20/2018    TONSILLECTOMY      TURBINATE RESECTION Bilateral 6/26/2018    Procedure: TURBINATE CAUTERY RESECTION WITH DEBRIDER (CRISTINA. MAXILLARY & CRISTIAN. ETHMOID);  Surgeon: Sina Cortez MD;  Location: [facility] OR;  Service: ENT;  Laterality: Bilateral;    UVULOPALATOPHARYNGOPLASTY      for REJI     Family History   Problem Relation Age of Onset    Diabetes Mother     Diabetes Father     Heart disease Father     Mental illness Brother         suicide    Cancer Neg Hx         prostate or colon     Social History     Tobacco Use    Smoking status: Current Every Day Smoker     Packs/day: 0.75     Types: Cigarettes     Last attempt to quit: 4/5/2019     Years since quitting: 3.3    Smokeless tobacco: Never Used    Tobacco comment:  x 34 yr.  Works at [employer].  Three kids.  Walks a lot at work.     Substance Use Topics    Alcohol use: Not Currently     Comment: only on occasion    Drug use: Never        Review of Systems   Constitutional: Negative for chills and fever.   HENT: Negative.    Respiratory: Negative.  Negative for shortness of breath.    Cardiovascular: Negative.  Negative for chest pain.   Gastrointestinal: Negative.  Negative for abdominal pain, nausea and vomiting.   Genitourinary: Negative.    Neurological: Negative for headaches.   All other systems reviewed and are negative.    OBJECTIVE:     Vitals:    08/23/22 1315   BP: 110/80   Pulse: 94   Temp: 98.5 °F (36.9 °C)     Body mass index is 28.89 kg/m².    Physical Exam  Constitutional:       General: He is not in acute distress.     Appearance: He is not diaphoretic.   HENT:      Head: Normocephalic and atraumatic.   Eyes:      Conjunctiva/sclera: Conjunctivae normal.   Pulmonary:      Effort: Pulmonary effort is normal. No respiratory distress.   Skin:      General: Skin is warm.   Neurological:      Mental Status: He is alert and oriented to person, place, and time.         Old records were reviewed. Labs and/or images were independently reviewed.    ASSESSMENT     1. Type 2 diabetes mellitus with stage 3a chronic kidney disease, without long-term current use of insulin    2. Essential hypertension, benign    3. Type 2 diabetes mellitus with mild nonproliferative retinopathy, with long-term current use of insulin, macular edema presence unspecified, unspecified laterality    4. Hypercholesterolemia    5. Tobacco abuse        PLAN:     Type 2 diabetes mellitus with stage 3a chronic kidney disease, without long-term current use of insulin/Type 2 diabetes mellitus with mild nonproliferative retinopathy, with long-term current use of insulin, macular edema presence unspecified, unspecified laterality  -     semaglutide (OZEMPIC) 2 mg/dose (8 mg/3 mL) PnIj; Inject 2 mg into the skin every 7 days.  Dispense: 9 mL; Refill: 3  -     Instructed patient to take daily glucose AM logs and to write them down to bring with on next visit. Advised patient to decrease intake of carbohydrates/simple sugars.         Essential hypertension, benign   -     Stable. Continue current regimen.    Hypercholesterolemia   -     Counseled patient about healthy diet, exercise habits, and to increase physical activity.    Tobacco abuse  -     Ambulatory referral/consult to Smoking Cessation Program; Future; Expected date: 08/30/2022  -     buPROPion (WELLBUTRIN XL) 150 MG TB24 tablet; Take 1 tablet (150 mg total) by mouth 2 (two) times a day.  Dispense: 60 tablet; Refill: 0  -     Counseled patient about importance of smoking cessation. Patient ready to quit. Will start smoking cessation treatment options.      RTC PRJOSE E Khan MD  08/23/2022 1:39 PM

## 2022-08-24 DIAGNOSIS — Z72.0 TOBACCO ABUSE: ICD-10-CM

## 2022-08-24 RX ORDER — BUPROPION HYDROCHLORIDE 150 MG/1
TABLET ORAL
Qty: 180 TABLET | Refills: 1 | OUTPATIENT
Start: 2022-08-24

## 2022-08-24 NOTE — TELEPHONE ENCOUNTER
No new care gaps identified.  [facility] Embedded Care Gaps. Reference number: [id number]. 8/24/2022   4:12:10 PM CDT

## 2022-10-06 ENCOUNTER — PATIENT MESSAGE (OUTPATIENT)
Dept: FAMILY MEDICINE | Facility: CLINIC | Age: 62
End: 2022-10-06
Payer: COMMERCIAL

## 2022-10-06 DIAGNOSIS — N52.9 ERECTILE DYSFUNCTION, UNSPECIFIED ERECTILE DYSFUNCTION TYPE: ICD-10-CM

## 2022-10-06 RX ORDER — SILDENAFIL 100 MG/1
100 TABLET, FILM COATED ORAL DAILY PRN
Qty: 30 TABLET | Refills: 5 | Status: SHIPPED | OUTPATIENT
Start: 2022-10-06 | End: 2022-11-03 | Stop reason: SDUPTHER

## 2022-10-06 NOTE — TELEPHONE ENCOUNTER
No new care gaps identified.  [facility] Embedded Care Gaps. Reference number: [id number]. 10/06/2022   1:56:06 PM CDT

## 2022-10-13 ENCOUNTER — TELEPHONE (OUTPATIENT)
Dept: FAMILY MEDICINE | Facility: CLINIC | Age: 62
End: 2022-10-13
Payer: COMMERCIAL

## 2022-10-13 NOTE — TELEPHONE ENCOUNTER
Patient states he haven't been feeling well for about 5 days. Patient states he has been having diarrhea, every time he eats he has been throwing it up. Patient states he haven't taking any medications for his symptoms. Patient states he has not taking a covid test but will be taking on. Scheduled appointment with Dr. EVITA Khan 10/24 at 12:40 pm.

## 2022-10-13 NOTE — TELEPHONE ENCOUNTER
----- Message from Georgia Good sent at 10/13/2022  3:04 PM CDT -----  Type:  Same Day Appointment Request    Caller is requesting a same day appointment.  Caller declined first available   appointment listed below.      Name of Caller: wife Tammie     When is the first available appointment? 10/21 w/ Dr. Smith     Symptoms: Not feeling well for about 2 weeks. Patient will not go into detail when asked symptoms.     Would the patient rather a call back or a response via [facility]? Call back     Best Call Back Number: [phone]     Add info: Willing to see anyone today or tomorrow.

## 2022-10-15 ENCOUNTER — HOSPITAL ENCOUNTER (INPATIENT)
Facility: HOSPITAL | Age: 62
LOS: 2 days | Discharge: HOME | DRG: 372 | End: 2022-10-17
Attending: EMERGENCY MEDICINE | Admitting: EMERGENCY MEDICINE
Payer: COMMERCIAL

## 2022-10-15 DIAGNOSIS — E11.22 TYPE 2 DIABETES MELLITUS WITH STAGE 3A CHRONIC KIDNEY DISEASE, WITHOUT LONG-TERM CURRENT USE OF INSULIN: ICD-10-CM

## 2022-10-15 DIAGNOSIS — A04.72 C. DIFFICILE COLITIS: Primary | ICD-10-CM

## 2022-10-15 DIAGNOSIS — N18.31 TYPE 2 DIABETES MELLITUS WITH STAGE 3A CHRONIC KIDNEY DISEASE, WITHOUT LONG-TERM CURRENT USE OF INSULIN: ICD-10-CM

## 2022-10-15 DIAGNOSIS — R19.7 DIARRHEA: ICD-10-CM

## 2022-10-15 DIAGNOSIS — R07.9 CHEST PAIN: ICD-10-CM

## 2022-10-15 DIAGNOSIS — R06.02 SHORTNESS OF BREATH: ICD-10-CM

## 2022-10-15 LAB
ALBUMIN SERPL BCP-MCNC: 3.5 G/DL (ref 3.5–5.2)
ALP SERPL-CCNC: 164 U/L (ref 55–135)
ALT SERPL W/O P-5'-P-CCNC: 17 U/L (ref 10–44)
ANION GAP SERPL CALC-SCNC: 21 MMOL/L (ref 8–16)
AST SERPL-CCNC: 9 U/L (ref 10–40)
BASOPHILS # BLD AUTO: ABNORMAL K/UL (ref 0–0.2)
BASOPHILS NFR BLD: 0 % (ref 0–1.9)
BILIRUB SERPL-MCNC: 0.9 MG/DL (ref 0.1–1)
BUN SERPL-MCNC: 19 MG/DL (ref 6–20)
CALCIUM SERPL-MCNC: 9.1 MG/DL (ref 8.7–10.5)
CHLORIDE SERPL-SCNC: 90 MMOL/L (ref 95–110)
CO2 SERPL-SCNC: 18 MMOL/L (ref 23–29)
CREAT SERPL-MCNC: 2.8 MG/DL (ref 0.5–1.4)
DIFFERENTIAL METHOD: ABNORMAL
EOSINOPHIL # BLD AUTO: ABNORMAL K/UL (ref 0–0.5)
EOSINOPHIL NFR BLD: 2 % (ref 0–8)
ERYTHROCYTE [DISTWIDTH] IN BLOOD BY AUTOMATED COUNT: 13.4 % (ref 11.5–14.5)
EST. GFR  (NO RACE VARIABLE): 25 ML/MIN/1.73 M^2
GLUCOSE SERPL-MCNC: 249 MG/DL (ref 70–110)
HCT VFR BLD AUTO: 52.7 % (ref 40–54)
HGB BLD-MCNC: 18.8 G/DL (ref 14–18)
IMM GRANULOCYTES # BLD AUTO: ABNORMAL K/UL (ref 0–0.04)
IMM GRANULOCYTES NFR BLD AUTO: ABNORMAL % (ref 0–0.5)
LACTATE SERPL-SCNC: 4 MMOL/L (ref 0.5–2.2)
LIPASE SERPL-CCNC: 14 U/L (ref 4–60)
LYMPHOCYTES # BLD AUTO: ABNORMAL K/UL (ref 1–4.8)
LYMPHOCYTES NFR BLD: 30 % (ref 18–48)
MCH RBC QN AUTO: 29.3 PG (ref 27–31)
MCHC RBC AUTO-ENTMCNC: 35.7 G/DL (ref 32–36)
MCV RBC AUTO: 82 FL (ref 82–98)
MONOCYTES # BLD AUTO: ABNORMAL K/UL (ref 0.3–1)
MONOCYTES NFR BLD: 9 % (ref 4–15)
NEUTROPHILS NFR BLD: 48 % (ref 38–73)
NEUTS BAND NFR BLD MANUAL: 11 %
NRBC BLD-RTO: 0 /100 WBC
PLATELET # BLD AUTO: 275 K/UL (ref 150–450)
PMV BLD AUTO: 9.7 FL (ref 9.2–12.9)
POTASSIUM SERPL-SCNC: 3.1 MMOL/L (ref 3.5–5.1)
PROT SERPL-MCNC: 7.1 G/DL (ref 6–8.4)
RBC # BLD AUTO: 6.42 M/UL (ref 4.6–6.2)
SODIUM SERPL-SCNC: 129 MMOL/L (ref 136–145)
TROPONIN I SERPL DL<=0.01 NG/ML-MCNC: <0.006 NG/ML (ref 0–0.03)
WBC # BLD AUTO: 12.09 K/UL (ref 3.9–12.7)

## 2022-10-15 PROCEDURE — 83735 ASSAY OF MAGNESIUM: CPT | Performed by: EMERGENCY MEDICINE

## 2022-10-15 PROCEDURE — 63600175 PHARM REV CODE 636 W HCPCS: Performed by: EMERGENCY MEDICINE

## 2022-10-15 PROCEDURE — 93010 EKG 12-LEAD: ICD-10-PCS | Mod: ,,, | Performed by: INTERNAL MEDICINE

## 2022-10-15 PROCEDURE — 96375 TX/PRO/DX INJ NEW DRUG ADDON: CPT

## 2022-10-15 PROCEDURE — 93005 ELECTROCARDIOGRAM TRACING: CPT

## 2022-10-15 PROCEDURE — 85007 BL SMEAR W/DIFF WBC COUNT: CPT | Performed by: EMERGENCY MEDICINE

## 2022-10-15 PROCEDURE — 83605 ASSAY OF LACTIC ACID: CPT | Performed by: EMERGENCY MEDICINE

## 2022-10-15 PROCEDURE — 85027 COMPLETE CBC AUTOMATED: CPT | Performed by: EMERGENCY MEDICINE

## 2022-10-15 PROCEDURE — 93010 ELECTROCARDIOGRAM REPORT: CPT | Mod: ,,, | Performed by: INTERNAL MEDICINE

## 2022-10-15 PROCEDURE — 27000207 HC ISOLATION

## 2022-10-15 PROCEDURE — 84484 ASSAY OF TROPONIN QUANT: CPT | Performed by: EMERGENCY MEDICINE

## 2022-10-15 PROCEDURE — 80053 COMPREHEN METABOLIC PANEL: CPT | Performed by: EMERGENCY MEDICINE

## 2022-10-15 PROCEDURE — 96361 HYDRATE IV INFUSION ADD-ON: CPT

## 2022-10-15 PROCEDURE — 96374 THER/PROPH/DIAG INJ IV PUSH: CPT

## 2022-10-15 PROCEDURE — 99285 EMERGENCY DEPT VISIT HI MDM: CPT | Mod: 25

## 2022-10-15 PROCEDURE — 87449 NOS EACH ORGANISM AG IA: CPT | Performed by: EMERGENCY MEDICINE

## 2022-10-15 PROCEDURE — 84100 ASSAY OF PHOSPHORUS: CPT | Performed by: EMERGENCY MEDICINE

## 2022-10-15 PROCEDURE — 83690 ASSAY OF LIPASE: CPT | Performed by: EMERGENCY MEDICINE

## 2022-10-15 PROCEDURE — 12000002 HC ACUTE/MED SURGE SEMI-PRIVATE ROOM

## 2022-10-15 RX ORDER — ONDANSETRON 2 MG/ML
4 INJECTION INTRAMUSCULAR; INTRAVENOUS
Status: COMPLETED | OUTPATIENT
Start: 2022-10-15 | End: 2022-10-15

## 2022-10-15 RX ORDER — MORPHINE SULFATE 4 MG/ML
6 INJECTION, SOLUTION INTRAMUSCULAR; INTRAVENOUS
Status: COMPLETED | OUTPATIENT
Start: 2022-10-15 | End: 2022-10-15

## 2022-10-15 RX ADMIN — SODIUM CHLORIDE, SODIUM LACTATE, POTASSIUM CHLORIDE, AND CALCIUM CHLORIDE 1000 ML: .6; .31; .03; .02 INJECTION, SOLUTION INTRAVENOUS at 09:10

## 2022-10-15 RX ADMIN — ONDANSETRON 4 MG: 2 INJECTION INTRAMUSCULAR; INTRAVENOUS at 10:10

## 2022-10-15 RX ADMIN — SODIUM CHLORIDE, SODIUM LACTATE, POTASSIUM CHLORIDE, AND CALCIUM CHLORIDE 1000 ML: .6; .31; .03; .02 INJECTION, SOLUTION INTRAVENOUS at 10:10

## 2022-10-15 RX ADMIN — MORPHINE SULFATE 6 MG: 4 INJECTION INTRAVENOUS at 10:10

## 2022-10-16 PROBLEM — E11.65 TYPE 2 DIABETES MELLITUS WITH HYPERGLYCEMIA, WITH LONG-TERM CURRENT USE OF INSULIN: Status: ACTIVE | Noted: 2019-04-22

## 2022-10-16 PROBLEM — F17.210 TOBACCO SMOKER, LESS THAN 10 CIGARETTES PER DAY: Status: ACTIVE | Noted: 2019-04-22

## 2022-10-16 PROBLEM — E87.6 HYPOKALEMIA: Status: ACTIVE | Noted: 2022-10-16

## 2022-10-16 PROBLEM — E86.1 HYPOTENSION DUE TO HYPOVOLEMIA: Status: ACTIVE | Noted: 2022-10-16

## 2022-10-16 PROBLEM — N17.9 AKI (ACUTE KIDNEY INJURY): Status: ACTIVE | Noted: 2022-10-16

## 2022-10-16 PROBLEM — K52.9 ENTERITIS: Status: ACTIVE | Noted: 2022-10-16

## 2022-10-16 PROBLEM — E87.20 LACTIC ACIDOSIS: Status: ACTIVE | Noted: 2022-10-16

## 2022-10-16 PROBLEM — Z79.4 TYPE 2 DIABETES MELLITUS WITH HYPERGLYCEMIA, WITH LONG-TERM CURRENT USE OF INSULIN: Status: ACTIVE | Noted: 2019-04-22

## 2022-10-16 LAB
ALBUMIN SERPL BCP-MCNC: 2.8 G/DL (ref 3.5–5.2)
ALBUMIN SERPL BCP-MCNC: 3.2 G/DL (ref 3.5–5.2)
ALLENS TEST: ABNORMAL
ALP SERPL-CCNC: 125 U/L (ref 55–135)
ALP SERPL-CCNC: 138 U/L (ref 55–135)
ALT SERPL W/O P-5'-P-CCNC: 14 U/L (ref 10–44)
ALT SERPL W/O P-5'-P-CCNC: 17 U/L (ref 10–44)
ANION GAP SERPL CALC-SCNC: 15 MMOL/L (ref 8–16)
ANION GAP SERPL CALC-SCNC: 15 MMOL/L (ref 8–16)
AST SERPL-CCNC: 10 U/L (ref 10–40)
AST SERPL-CCNC: 17 U/L (ref 10–40)
BACTERIA #/AREA URNS HPF: ABNORMAL /HPF
BASOPHILS # BLD AUTO: 0.06 K/UL (ref 0–0.2)
BASOPHILS NFR BLD: 0 % (ref 0–1.9)
BASOPHILS NFR BLD: 0.5 % (ref 0–1.9)
BILIRUB SERPL-MCNC: 0.5 MG/DL (ref 0.1–1)
BILIRUB SERPL-MCNC: 0.7 MG/DL (ref 0.1–1)
BILIRUB UR QL STRIP: NEGATIVE
BNP SERPL-MCNC: 12 PG/ML (ref 0–99)
BUN SERPL-MCNC: 16 MG/DL (ref 6–20)
BUN SERPL-MCNC: 18 MG/DL (ref 6–20)
C DIFF GDH STL QL: POSITIVE
C DIFF TOX A+B STL QL IA: POSITIVE
CALCIUM SERPL-MCNC: 8 MG/DL (ref 8.7–10.5)
CALCIUM SERPL-MCNC: 8.8 MG/DL (ref 8.7–10.5)
CHLORIDE SERPL-SCNC: 94 MMOL/L (ref 95–110)
CHLORIDE SERPL-SCNC: 97 MMOL/L (ref 95–110)
CLARITY UR: CLEAR
CO2 SERPL-SCNC: 23 MMOL/L (ref 23–29)
CO2 SERPL-SCNC: 24 MMOL/L (ref 23–29)
COLOR UR: YELLOW
CREAT SERPL-MCNC: 1.7 MG/DL (ref 0.5–1.4)
CREAT SERPL-MCNC: 2.3 MG/DL (ref 0.5–1.4)
CRP SERPL-MCNC: 19.8 MG/L (ref 0–8.2)
DELSYS: ABNORMAL
DIFFERENTIAL METHOD: ABNORMAL
DIFFERENTIAL METHOD: NORMAL
EOSINOPHIL # BLD AUTO: 3.1 K/UL (ref 0–0.5)
EOSINOPHIL NFR BLD: 0 % (ref 0–8)
EOSINOPHIL NFR BLD: 27.7 % (ref 0–8)
ERYTHROCYTE [DISTWIDTH] IN BLOOD BY AUTOMATED COUNT: 13.2 % (ref 11.5–14.5)
ERYTHROCYTE [DISTWIDTH] IN BLOOD BY AUTOMATED COUNT: 13.3 % (ref 11.5–14.5)
ERYTHROCYTE [SEDIMENTATION RATE] IN BLOOD BY WESTERGREN METHOD: 1 MM/HR (ref 0–10)
EST. GFR  (NO RACE VARIABLE): 32 ML/MIN/1.73 M^2
EST. GFR  (NO RACE VARIABLE): 46 ML/MIN/1.73 M^2
GLUCOSE SERPL-MCNC: 146 MG/DL (ref 70–110)
GLUCOSE SERPL-MCNC: 171 MG/DL (ref 70–110)
GLUCOSE UR QL STRIP: ABNORMAL
HCO3 UR-SCNC: 22.5 MMOL/L (ref 24–28)
HCT VFR BLD AUTO: 44.7 % (ref 40–54)
HCT VFR BLD AUTO: 46.7 % (ref 40–54)
HGB BLD-MCNC: 15.8 G/DL (ref 14–18)
HGB BLD-MCNC: 16.8 G/DL (ref 14–18)
HGB UR QL STRIP: NEGATIVE
HIV 1+2 AB+HIV1 P24 AG SERPL QL IA: NORMAL
HYALINE CASTS #/AREA URNS LPF: 5 /LPF
IMM GRANULOCYTES # BLD AUTO: 0.21 K/UL (ref 0–0.04)
IMM GRANULOCYTES # BLD AUTO: NORMAL K/UL (ref 0–0.04)
IMM GRANULOCYTES NFR BLD AUTO: 1.9 % (ref 0–0.5)
IMM GRANULOCYTES NFR BLD AUTO: NORMAL % (ref 0–0.5)
KETONES UR QL STRIP: ABNORMAL
LACTATE SERPL-SCNC: 1.8 MMOL/L (ref 0.5–2.2)
LACTATE SERPL-SCNC: 5 MMOL/L (ref 0.5–2.2)
LEUKOCYTE ESTERASE UR QL STRIP: NEGATIVE
LYMPHOCYTES # BLD AUTO: 2.9 K/UL (ref 1–4.8)
LYMPHOCYTES NFR BLD: 18 % (ref 18–48)
LYMPHOCYTES NFR BLD: 25.4 % (ref 18–48)
MAGNESIUM SERPL-MCNC: 1.1 MG/DL (ref 1.6–2.6)
MAGNESIUM SERPL-MCNC: 1.2 MG/DL (ref 1.6–2.6)
MAGNESIUM SERPL-MCNC: 2.7 MG/DL (ref 1.6–2.6)
MCH RBC QN AUTO: 29.3 PG (ref 27–31)
MCH RBC QN AUTO: 30.1 PG (ref 27–31)
MCHC RBC AUTO-ENTMCNC: 35.3 G/DL (ref 32–36)
MCHC RBC AUTO-ENTMCNC: 36 G/DL (ref 32–36)
MCV RBC AUTO: 83 FL (ref 82–98)
MCV RBC AUTO: 84 FL (ref 82–98)
MICROSCOPIC COMMENT: ABNORMAL
MODE: ABNORMAL
MONOCYTES # BLD AUTO: 0.9 K/UL (ref 0.3–1)
MONOCYTES NFR BLD: 5 % (ref 4–15)
MONOCYTES NFR BLD: 7.5 % (ref 4–15)
NEUTROPHILS # BLD AUTO: 4.2 K/UL (ref 1.8–7.7)
NEUTROPHILS NFR BLD: 37 % (ref 38–73)
NEUTROPHILS NFR BLD: 68 % (ref 38–73)
NEUTS BAND NFR BLD MANUAL: 9 %
NITRITE UR QL STRIP: NEGATIVE
NRBC BLD-RTO: 0 /100 WBC
NRBC BLD-RTO: 0 /100 WBC
OB PNL STL: POSITIVE
PCO2 BLDA: 34.1 MMHG (ref 35–45)
PH SMN: 7.43 [PH] (ref 7.35–7.45)
PH UR STRIP: 6 [PH] (ref 5–8)
PHOSPHATE SERPL-MCNC: 3.2 MG/DL (ref 2.7–4.5)
PHOSPHATE SERPL-MCNC: 4.2 MG/DL (ref 2.7–4.5)
PHOSPHATE SERPL-MCNC: 4.5 MG/DL (ref 2.7–4.5)
PLATELET # BLD AUTO: 210 K/UL (ref 150–450)
PLATELET # BLD AUTO: 218 K/UL (ref 150–450)
PMV BLD AUTO: 9.1 FL (ref 9.2–12.9)
PMV BLD AUTO: 9.6 FL (ref 9.2–12.9)
PO2 BLDA: 89 MMHG (ref 80–100)
POC BE: -1 MMOL/L
POC SATURATED O2: 97 % (ref 95–100)
POC TCO2: 24 MMOL/L (ref 23–27)
POCT GLUCOSE: 109 MG/DL (ref 70–110)
POCT GLUCOSE: 123 MG/DL (ref 70–110)
POCT GLUCOSE: 139 MG/DL (ref 70–110)
POCT GLUCOSE: 200 MG/DL (ref 70–110)
POTASSIUM SERPL-SCNC: 2.9 MMOL/L (ref 3.5–5.1)
POTASSIUM SERPL-SCNC: 3.8 MMOL/L (ref 3.5–5.1)
PROCALCITONIN SERPL IA-MCNC: 0.22 NG/ML
PROT SERPL-MCNC: 5.5 G/DL (ref 6–8.4)
PROT SERPL-MCNC: 6.2 G/DL (ref 6–8.4)
PROT UR QL STRIP: ABNORMAL
RBC # BLD AUTO: 5.4 M/UL (ref 4.6–6.2)
RBC # BLD AUTO: 5.58 M/UL (ref 4.6–6.2)
RBC #/AREA URNS HPF: 0 /HPF (ref 0–4)
SAMPLE: ABNORMAL
SITE: ABNORMAL
SODIUM SERPL-SCNC: 132 MMOL/L (ref 136–145)
SODIUM SERPL-SCNC: 136 MMOL/L (ref 136–145)
SP GR UR STRIP: 1.02 (ref 1–1.03)
URN SPEC COLLECT METH UR: ABNORMAL
UROBILINOGEN UR STRIP-ACNC: NEGATIVE EU/DL
WBC # BLD AUTO: 10.96 K/UL (ref 3.9–12.7)
WBC # BLD AUTO: 11.32 K/UL (ref 3.9–12.7)
WBC #/AREA STL HPF: ABNORMAL /[HPF]
WBC #/AREA URNS HPF: 1 /HPF (ref 0–5)

## 2022-10-16 PROCEDURE — 86140 C-REACTIVE PROTEIN: CPT | Performed by: INTERNAL MEDICINE

## 2022-10-16 PROCEDURE — S0030 INJECTION, METRONIDAZOLE: HCPCS | Performed by: INTERNAL MEDICINE

## 2022-10-16 PROCEDURE — 87040 BLOOD CULTURE FOR BACTERIA: CPT | Mod: 59 | Performed by: STUDENT IN AN ORGANIZED HEALTH CARE EDUCATION/TRAINING PROGRAM

## 2022-10-16 PROCEDURE — 87045 FECES CULTURE AEROBIC BACT: CPT | Performed by: INTERNAL MEDICINE

## 2022-10-16 PROCEDURE — A4216 STERILE WATER/SALINE, 10 ML: HCPCS | Performed by: STUDENT IN AN ORGANIZED HEALTH CARE EDUCATION/TRAINING PROGRAM

## 2022-10-16 PROCEDURE — 83605 ASSAY OF LACTIC ACID: CPT | Performed by: STUDENT IN AN ORGANIZED HEALTH CARE EDUCATION/TRAINING PROGRAM

## 2022-10-16 PROCEDURE — 82272 OCCULT BLD FECES 1-3 TESTS: CPT | Performed by: INTERNAL MEDICINE

## 2022-10-16 PROCEDURE — 87046 STOOL CULTR AEROBIC BACT EA: CPT | Mod: 59 | Performed by: INTERNAL MEDICINE

## 2022-10-16 PROCEDURE — 81000 URINALYSIS NONAUTO W/SCOPE: CPT | Performed by: INTERNAL MEDICINE

## 2022-10-16 PROCEDURE — 25000003 PHARM REV CODE 250: Performed by: INTERNAL MEDICINE

## 2022-10-16 PROCEDURE — 99223 1ST HOSP IP/OBS HIGH 75: CPT | Mod: ,,, | Performed by: INTERNAL MEDICINE

## 2022-10-16 PROCEDURE — 63600175 PHARM REV CODE 636 W HCPCS: Performed by: INTERNAL MEDICINE

## 2022-10-16 PROCEDURE — 99900035 HC TECH TIME PER 15 MIN (STAT)

## 2022-10-16 PROCEDURE — 85027 COMPLETE CBC AUTOMATED: CPT | Performed by: INTERNAL MEDICINE

## 2022-10-16 PROCEDURE — 83735 ASSAY OF MAGNESIUM: CPT | Mod: 91 | Performed by: STUDENT IN AN ORGANIZED HEALTH CARE EDUCATION/TRAINING PROGRAM

## 2022-10-16 PROCEDURE — 21400001 HC TELEMETRY ROOM

## 2022-10-16 PROCEDURE — 63600175 PHARM REV CODE 636 W HCPCS: Performed by: STUDENT IN AN ORGANIZED HEALTH CARE EDUCATION/TRAINING PROGRAM

## 2022-10-16 PROCEDURE — 82803 BLOOD GASES ANY COMBINATION: CPT

## 2022-10-16 PROCEDURE — 25000003 PHARM REV CODE 250: Performed by: STUDENT IN AN ORGANIZED HEALTH CARE EDUCATION/TRAINING PROGRAM

## 2022-10-16 PROCEDURE — 99223 PR INITIAL HOSPITAL CARE,LEVL III: ICD-10-PCS | Mod: ,,, | Performed by: INTERNAL MEDICINE

## 2022-10-16 PROCEDURE — 36415 COLL VENOUS BLD VENIPUNCTURE: CPT | Performed by: INTERNAL MEDICINE

## 2022-10-16 PROCEDURE — 80053 COMPREHEN METABOLIC PANEL: CPT | Mod: 91 | Performed by: STUDENT IN AN ORGANIZED HEALTH CARE EDUCATION/TRAINING PROGRAM

## 2022-10-16 PROCEDURE — 85025 COMPLETE CBC W/AUTO DIFF WBC: CPT | Performed by: STUDENT IN AN ORGANIZED HEALTH CARE EDUCATION/TRAINING PROGRAM

## 2022-10-16 PROCEDURE — 87389 HIV-1 AG W/HIV-1&-2 AB AG IA: CPT | Performed by: INTERNAL MEDICINE

## 2022-10-16 PROCEDURE — 89055 LEUKOCYTE ASSESSMENT FECAL: CPT | Performed by: INTERNAL MEDICINE

## 2022-10-16 PROCEDURE — 83735 ASSAY OF MAGNESIUM: CPT | Performed by: INTERNAL MEDICINE

## 2022-10-16 PROCEDURE — 80053 COMPREHEN METABOLIC PANEL: CPT | Performed by: INTERNAL MEDICINE

## 2022-10-16 PROCEDURE — 36600 WITHDRAWAL OF ARTERIAL BLOOD: CPT

## 2022-10-16 PROCEDURE — 84100 ASSAY OF PHOSPHORUS: CPT | Mod: 91 | Performed by: STUDENT IN AN ORGANIZED HEALTH CARE EDUCATION/TRAINING PROGRAM

## 2022-10-16 PROCEDURE — 87209 SMEAR COMPLEX STAIN: CPT | Performed by: INTERNAL MEDICINE

## 2022-10-16 PROCEDURE — 84100 ASSAY OF PHOSPHORUS: CPT | Performed by: INTERNAL MEDICINE

## 2022-10-16 PROCEDURE — 85007 BL SMEAR W/DIFF WBC COUNT: CPT | Performed by: INTERNAL MEDICINE

## 2022-10-16 PROCEDURE — 27000207 HC ISOLATION

## 2022-10-16 PROCEDURE — 84145 PROCALCITONIN (PCT): CPT | Performed by: INTERNAL MEDICINE

## 2022-10-16 PROCEDURE — 83880 ASSAY OF NATRIURETIC PEPTIDE: CPT | Performed by: EMERGENCY MEDICINE

## 2022-10-16 PROCEDURE — 85652 RBC SED RATE AUTOMATED: CPT | Performed by: INTERNAL MEDICINE

## 2022-10-16 PROCEDURE — 36415 COLL VENOUS BLD VENIPUNCTURE: CPT | Performed by: STUDENT IN AN ORGANIZED HEALTH CARE EDUCATION/TRAINING PROGRAM

## 2022-10-16 PROCEDURE — 87427 SHIGA-LIKE TOXIN AG IA: CPT | Mod: 59 | Performed by: INTERNAL MEDICINE

## 2022-10-16 RX ORDER — IBUPROFEN 200 MG
16 TABLET ORAL
Status: DISCONTINUED | OUTPATIENT
Start: 2022-10-16 | End: 2022-10-17 | Stop reason: HOSPADM

## 2022-10-16 RX ORDER — SODIUM CHLORIDE 9 MG/ML
INJECTION, SOLUTION INTRAVENOUS CONTINUOUS
Status: DISCONTINUED | OUTPATIENT
Start: 2022-10-16 | End: 2022-10-17

## 2022-10-16 RX ORDER — BISMUTH SUBSALICYLATE 525 MG/30ML
30 LIQUID ORAL 4 TIMES DAILY
Status: DISCONTINUED | OUTPATIENT
Start: 2022-10-16 | End: 2022-10-16

## 2022-10-16 RX ORDER — SODIUM CHLORIDE AND POTASSIUM CHLORIDE 150; 900 MG/100ML; MG/100ML
INJECTION, SOLUTION INTRAVENOUS CONTINUOUS
Status: DISCONTINUED | OUTPATIENT
Start: 2022-10-16 | End: 2022-10-16

## 2022-10-16 RX ORDER — NAPROXEN SODIUM 220 MG/1
81 TABLET, FILM COATED ORAL DAILY
Status: DISCONTINUED | OUTPATIENT
Start: 2022-10-16 | End: 2022-10-17 | Stop reason: HOSPADM

## 2022-10-16 RX ORDER — ACETAMINOPHEN 325 MG/1
650 TABLET ORAL EVERY 4 HOURS PRN
Status: DISCONTINUED | OUTPATIENT
Start: 2022-10-16 | End: 2022-10-17 | Stop reason: HOSPADM

## 2022-10-16 RX ORDER — ONDANSETRON 2 MG/ML
4 INJECTION INTRAMUSCULAR; INTRAVENOUS EVERY 8 HOURS PRN
Status: DISCONTINUED | OUTPATIENT
Start: 2022-10-16 | End: 2022-10-17 | Stop reason: HOSPADM

## 2022-10-16 RX ORDER — MAGNESIUM SULFATE HEPTAHYDRATE 40 MG/ML
2 INJECTION, SOLUTION INTRAVENOUS
Status: COMPLETED | OUTPATIENT
Start: 2022-10-16 | End: 2022-10-16

## 2022-10-16 RX ORDER — INSULIN ASPART 100 [IU]/ML
0-5 INJECTION, SOLUTION INTRAVENOUS; SUBCUTANEOUS
Status: DISCONTINUED | OUTPATIENT
Start: 2022-10-16 | End: 2022-10-17 | Stop reason: HOSPADM

## 2022-10-16 RX ORDER — SODIUM CHLORIDE 0.9 % (FLUSH) 0.9 %
10 SYRINGE (ML) INJECTION EVERY 12 HOURS PRN
Status: DISCONTINUED | OUTPATIENT
Start: 2022-10-16 | End: 2022-10-17 | Stop reason: HOSPADM

## 2022-10-16 RX ORDER — PANTOPRAZOLE SODIUM 40 MG/1
40 TABLET, DELAYED RELEASE ORAL DAILY
Status: DISCONTINUED | OUTPATIENT
Start: 2022-10-16 | End: 2022-10-17

## 2022-10-16 RX ORDER — SIMETHICONE 80 MG
1 TABLET,CHEWABLE ORAL 4 TIMES DAILY PRN
Status: DISCONTINUED | OUTPATIENT
Start: 2022-10-16 | End: 2022-10-17 | Stop reason: HOSPADM

## 2022-10-16 RX ORDER — NITROGLYCERIN 0.4 MG/1
0.4 TABLET SUBLINGUAL EVERY 5 MIN PRN
Status: DISCONTINUED | OUTPATIENT
Start: 2022-10-16 | End: 2022-10-17 | Stop reason: HOSPADM

## 2022-10-16 RX ORDER — MAG HYDROX/ALUMINUM HYD/SIMETH 200-200-20
30 SUSPENSION, ORAL (FINAL DOSE FORM) ORAL 4 TIMES DAILY PRN
Status: DISCONTINUED | OUTPATIENT
Start: 2022-10-16 | End: 2022-10-17 | Stop reason: HOSPADM

## 2022-10-16 RX ORDER — TALC
6 POWDER (GRAM) TOPICAL NIGHTLY PRN
Status: DISCONTINUED | OUTPATIENT
Start: 2022-10-16 | End: 2022-10-17 | Stop reason: HOSPADM

## 2022-10-16 RX ORDER — IPRATROPIUM BROMIDE AND ALBUTEROL SULFATE 2.5; .5 MG/3ML; MG/3ML
3 SOLUTION RESPIRATORY (INHALATION) EVERY 4 HOURS PRN
Status: DISCONTINUED | OUTPATIENT
Start: 2022-10-16 | End: 2022-10-17 | Stop reason: HOSPADM

## 2022-10-16 RX ORDER — IBUPROFEN 200 MG
24 TABLET ORAL
Status: DISCONTINUED | OUTPATIENT
Start: 2022-10-16 | End: 2022-10-17 | Stop reason: HOSPADM

## 2022-10-16 RX ORDER — MORPHINE SULFATE 4 MG/ML
2 INJECTION, SOLUTION INTRAMUSCULAR; INTRAVENOUS EVERY 4 HOURS PRN
Status: DISCONTINUED | OUTPATIENT
Start: 2022-10-16 | End: 2022-10-17

## 2022-10-16 RX ORDER — GLUCAGON 1 MG
1 KIT INJECTION
Status: DISCONTINUED | OUTPATIENT
Start: 2022-10-16 | End: 2022-10-17 | Stop reason: HOSPADM

## 2022-10-16 RX ORDER — METOPROLOL TARTRATE 25 MG/1
12.5 TABLET ORAL 2 TIMES DAILY
Status: DISCONTINUED | OUTPATIENT
Start: 2022-10-16 | End: 2022-10-16

## 2022-10-16 RX ORDER — POTASSIUM CHLORIDE 7.45 MG/ML
10 INJECTION INTRAVENOUS ONCE
Status: COMPLETED | OUTPATIENT
Start: 2022-10-16 | End: 2022-10-16

## 2022-10-16 RX ORDER — ATORVASTATIN CALCIUM 40 MG/1
40 TABLET, FILM COATED ORAL NIGHTLY
Status: DISCONTINUED | OUTPATIENT
Start: 2022-10-16 | End: 2022-10-17 | Stop reason: HOSPADM

## 2022-10-16 RX ORDER — NALOXONE HCL 0.4 MG/ML
0.02 VIAL (ML) INJECTION
Status: DISCONTINUED | OUTPATIENT
Start: 2022-10-16 | End: 2022-10-17 | Stop reason: HOSPADM

## 2022-10-16 RX ORDER — METRONIDAZOLE 500 MG/100ML
500 INJECTION, SOLUTION INTRAVENOUS
Status: DISCONTINUED | OUTPATIENT
Start: 2022-10-16 | End: 2022-10-16

## 2022-10-16 RX ORDER — BISMUTH SUBSALICYLATE 525 MG/30ML
30 LIQUID ORAL EVERY 4 HOURS
Status: DISCONTINUED | OUTPATIENT
Start: 2022-10-16 | End: 2022-10-17

## 2022-10-16 RX ORDER — HEPARIN SODIUM 5000 [USP'U]/ML
5000 INJECTION, SOLUTION INTRAVENOUS; SUBCUTANEOUS EVERY 8 HOURS
Status: DISCONTINUED | OUTPATIENT
Start: 2022-10-16 | End: 2022-10-17

## 2022-10-16 RX ADMIN — MAGNESIUM SULFATE 2 G: 2 INJECTION INTRAVENOUS at 10:10

## 2022-10-16 RX ADMIN — ATORVASTATIN CALCIUM 40 MG: 40 TABLET, FILM COATED ORAL at 03:10

## 2022-10-16 RX ADMIN — SODIUM CHLORIDE AND POTASSIUM CHLORIDE: 9; 1.49 INJECTION, SOLUTION INTRAVENOUS at 03:10

## 2022-10-16 RX ADMIN — Medication 500 MG: at 05:10

## 2022-10-16 RX ADMIN — ALUMINUM HYDROXIDE, MAGNESIUM HYDROXIDE, AND DIMETHICONE 30 ML: 200; 20; 200 SUSPENSION ORAL at 03:10

## 2022-10-16 RX ADMIN — HEPARIN SODIUM 5000 UNITS: 5000 INJECTION INTRAVENOUS; SUBCUTANEOUS at 10:10

## 2022-10-16 RX ADMIN — SODIUM CHLORIDE: 0.9 INJECTION, SOLUTION INTRAVENOUS at 05:10

## 2022-10-16 RX ADMIN — PANTOPRAZOLE SODIUM 40 MG: 40 TABLET, DELAYED RELEASE ORAL at 08:10

## 2022-10-16 RX ADMIN — POTASSIUM BICARBONATE 40 MEQ: 391 TABLET, EFFERVESCENT ORAL at 08:10

## 2022-10-16 RX ADMIN — ASPIRIN 81 MG CHEWABLE TABLET 81 MG: 81 TABLET CHEWABLE at 08:10

## 2022-10-16 RX ADMIN — Medication 6 MG: at 10:10

## 2022-10-16 RX ADMIN — Medication 30 ML: at 06:10

## 2022-10-16 RX ADMIN — ATORVASTATIN CALCIUM 40 MG: 40 TABLET, FILM COATED ORAL at 10:10

## 2022-10-16 RX ADMIN — METRONIDAZOLE 500 MG: 500 INJECTION, SOLUTION INTRAVENOUS at 03:10

## 2022-10-16 RX ADMIN — POTASSIUM CHLORIDE 10 MEQ: 7.46 INJECTION, SOLUTION INTRAVENOUS at 01:10

## 2022-10-16 RX ADMIN — CEFTRIAXONE 2 G: 2 INJECTION, POWDER, FOR SOLUTION INTRAMUSCULAR; INTRAVENOUS at 03:10

## 2022-10-16 RX ADMIN — Medication 30 ML: at 08:10

## 2022-10-16 RX ADMIN — HEPARIN SODIUM 5000 UNITS: 5000 INJECTION INTRAVENOUS; SUBCUTANEOUS at 06:10

## 2022-10-16 RX ADMIN — SODIUM CHLORIDE AND POTASSIUM CHLORIDE: 9; 1.49 INJECTION, SOLUTION INTRAVENOUS at 02:10

## 2022-10-16 RX ADMIN — HEPARIN SODIUM 5000 UNITS: 5000 INJECTION INTRAVENOUS; SUBCUTANEOUS at 03:10

## 2022-10-16 RX ADMIN — INSULIN DETEMIR 4 UNITS: 100 INJECTION, SOLUTION SUBCUTANEOUS at 10:10

## 2022-10-16 RX ADMIN — Medication 30 ML: at 12:10

## 2022-10-16 RX ADMIN — METRONIDAZOLE 500 MG: 500 INJECTION, SOLUTION INTRAVENOUS at 08:10

## 2022-10-16 RX ADMIN — MAGNESIUM SULFATE 2 G: 2 INJECTION INTRAVENOUS at 12:10

## 2022-10-16 RX ADMIN — Medication 30 ML: at 05:10

## 2022-10-16 NOTE — ASSESSMENT & PLAN NOTE
-Glucose 249 in ED.  The patient is not currently tolerating PO well.    -Low correction SSI w/Accuchecks AC/HS  -Clear liquid diet for now

## 2022-10-16 NOTE — NURSING
Pt arrived to floor via W/C .Awake and alert . IV infusing. Wife at the bedside Denies pain or discomfort at this time . Bed down SR up x 2 HOB. Call bell in reach

## 2022-10-16 NOTE — HOSPITAL COURSE
Mr Stanley Faustin was admitted with diarrhea and FRENCH. Cdiff positive. Started PO vanc. Symptoms improving, diarrhea decreased, no abdominal pain, tolerating diet. FRENCH resolved. Stable for discharge to home with PO vanc x10 days. Follow up with PCP.

## 2022-10-16 NOTE — ASSESSMENT & PLAN NOTE
  metoprolol tartrate (LOPRESSOR) split tablet 12.5 mg, 12.5 mg, Oral, BID  Holding home Lisinopril given transient hypotension

## 2022-10-16 NOTE — HPI
"Mr. Rose is a 61yo man with a past medical history of CAD, DM2, HLD, HTN, MI with PCI and stenting, and cluster headaches who presented to the hospital for evaluation of diarrhea.  The patient began to have diarrhea last Sunday, 2 episodes.  He states by Wednesday he was having multiple episodes, that he associated with anytime he ate or drank, which prompted him to present to the ED on Thursday for evaluation.      A CT abdomen pelvis was obtained at that time and showed a nonspecific diarrheal illness or enteritis.  He was discharged home on Cipro.  The patient continued to have episodes of diarrhea when he ate or drank and had x1 episode of vomiting today.  He returned to the ED for evaluation.    Currently the patient is alert and appears to be in no distress.  He denies CP, abdominal pain, SOB, nausea, or dizziness at this time.  He endorses feeling lightheaded and intermittently SOB when he has the diarrhea episodes and states he has diarrhea shortly after eating or drinking.  His abdomen is non distended, bowel sounds are decreased but he has no abdominal tenderness.    Today in the ED his vital signs were BP 89/56 -> 132/71   Pulse 123 -> 91   Temp 97.8 °F (36.6 °C) (Oral)   Resp 19   Ht 5' 9" (1.753 m)   Wt 81.6 kg (180 lb)   SpO2 100% RA  BMI 26.58 kg/m².  Labs showed WBC 12, Hg 18.8, Na 129, K 3.1, Cr 1.5 yesterday -> 2.8 today (baseline 0.9), gluc 249, normal LFTs.  LA 1.8 yesterday -> 4 today.  EKG today showed sinus tachy, rate 127 with PVC's.    In the ED he was treated with LR 1L x 2, Morphine 6mg IV, and Zofran 4mg IV.    CT abdomen and pelvis 10/14/22: Minimal fluid distension of small bowel in the lower abdomen and liquid stool throughout the colon.  Findings may represent a nonspecific diarrheal illness or enteritis in the appropriate clinical setting.  Aortoiliac atherosclerosis with moderate narrowing of the right superficial femoral artery.  Hepatic steatosis.  Postoperative changes " of prior median sternotomy.

## 2022-10-16 NOTE — SUBJECTIVE & OBJECTIVE
Past Medical History:   Diagnosis Date    Cluster headaches     Coronary artery disease     s/p stent    Diabetes mellitus     Diabetes mellitus type II     Headache     High cholesterol     Hyperlipidemia     Hypertension     Myocardial infarction        Past Surgical History:   Procedure Laterality Date    COLONOSCOPY N/A 4/1/2021    Procedure: COLONOSCOPY;  Surgeon: Bernard Leach MD;  Location: [facility] ENDO;  Service: Endoscopy;  Laterality: N/A;  covid test 3/29 lapalco, current smoker, prep instr emailed, 1 visitor policy explained -ml  3/30 pt understands earlier arrival time and earlier prep time -ml    CORONARY ANGIOPLASTY WITH STENT PLACEMENT      ENDOSCOPIC NASAL SEPTOPLASTY N/A 2/11/2020    Procedure: SEPTOPLASTY, NOSE, ENDOSCOPIC;  Surgeon: Clifton Angel MD;  Location: [facility] OR;  Service: ENT;  Laterality: N/A;  DISC LOADED BY [facility] ON 2-5-2020  RN PRE OP 2-7-2020 [place]  NEED H/P    FUNCTIONAL ENDOSCOPIC SINUS SURGERY (FESS) USING COMPUTER-ASSISTED NAVIGATION Bilateral 6/26/2018    Procedure: SINUS SURGERY FUNCTIONAL ENDOSCOPIC WITH NAVIGATION;  Surgeon: Sina Cortez MD;  Location: [facility] OR;  Service: ENT;  Laterality: Bilateral;    FUNCTIONAL ENDOSCOPIC SINUS SURGERY (FESS) USING COMPUTER-ASSISTED NAVIGATION Bilateral 2/11/2020    Procedure: FESS, USING COMPUTER-ASSISTED NAVIGATION;  Surgeon: Clifton Angel MD;  Location: [facility] OR;  Service: ENT;  Laterality: Bilateral;    left shoulder      DC CABG, ARTERY-VEIN, FOUR  04/12/2019    Coronary Artery Bypass, 4    RECONSTRUCTION OF NOSE Bilateral 6/26/2018    Procedure: RECONSTRUCTION-NASAL;  Surgeon: Sina Cortez MD;  Location: [facility] OR;  Service: ENT;  Laterality: Bilateral;  ComptTIA  RN PREOP 6/20/2018    TONSILLECTOMY      TURBINATE RESECTION Bilateral 6/26/2018    Procedure: TURBINATE CAUTERY RESECTION WITH DEBRIDER (CRISTINA. MAXILLARY & CRISTINA. ETHMOID);  Surgeon: Sina Cortez MD;  Location: [facility] OR;  Service: ENT;  Laterality: Bilateral;     UVULOPALATOPHARYNGOPLASTY      for REJI       Review of patient's allergies indicates:  No Known Allergies    No current facility-administered medications on file prior to encounter.     Current Outpatient Medications on File Prior to Encounter   Medication Sig    aspirin (ECOTRIN) 81 MG EC tablet Take 81 mg by mouth once daily.    atorvastatin (LIPITOR) 40 MG tablet Take 1 tablet (40 mg total) by mouth once daily. (Patient taking differently: Take 40 mg by mouth every evening.)    ciprofloxacin HCl (CIPRO) 500 MG tablet Take 1 tablet (500 mg total) by mouth 2 (two) times daily. for 5 days    clotrimazole-betamethasone 1-0.05% (LOTRISONE) cream APPLY 1 TO 2 TIMES A DAY AS NEEDED    diclofenac sodium (VOLTAREN) 1 % Gel APPLY 2 G TOPICALLY 2 (TWO) TIMES DAILY. (Patient taking differently: Apply 2 g topically 2 (two) times daily. PRN)    empagliflozin (JARDIANCE) 25 mg tablet Take 1 tablet (25 mg total) by mouth once daily. (Patient taking differently: Take 25 mg by mouth once daily. QAM)    insulin degludec (TRESIBA FLEXTOUCH U-100) 100 unit/mL (3 mL) insulin pen Inject 35 Units into the skin once daily. (Patient taking differently: Inject 35 Units into the skin every evening.)    lisinopriL (PRINIVIL,ZESTRIL) 5 MG tablet Take 1 tablet (5 mg total) by mouth once daily. (Patient taking differently: Take 5 mg by mouth every morning.)    loperamide (IMODIUM) 2 mg capsule Take 1 capsule (2 mg total) by mouth 4 (four) times daily as needed for Diarrhea.    metFORMIN (GLUCOPHAGE) 1000 MG tablet Take 1 tablet (1,000 mg total) by mouth 2 (two) times daily with meals.    metoprolol tartrate (LOPRESSOR) 25 MG tablet Take 0.5 tablets (12.5 mg total) by mouth 2 (two) times daily.    nitroGLYCERIN (NITROSTAT) 0.4 MG SL tablet PLACE ONE TABLET UNDER THE TONGUE EVERY 5 MINUTES AS NEEDED    ondansetron (ZOFRAN-ODT) 4 MG TbDL Take 1 tablet (4 mg total) by mouth every 4 (four) hours as needed.    pantoprazole (PROTONIX) 40 MG tablet  "Take 1 tablet (40 mg total) by mouth daily as needed.    semaglutide (OZEMPIC) 2 mg/dose (8 mg/3 mL) PnIj Inject 2 mg into the skin every 7 days. (Patient taking differently: Inject 2 mg into the skin every 7 days. Thursday)    sildenafiL (VIAGRA) 100 MG tablet Take 1 tablet (100 mg total) by mouth daily as needed for Erectile Dysfunction.    blood sugar diagnostic Strp 1 strip by Misc.(Non-Drug; Combo Route) route 3 (three) times daily.    blood-glucose meter kit Use as instructed    buPROPion (WELLBUTRIN XL) 150 MG TB24 tablet TAKE 1 TABLET BY MOUTH TWICE A DAY    lancets Misc 1 application by Misc.(Non-Drug; Combo Route) route 3 (three) times daily.    nateglinide (STARLIX) 120 MG tablet TAKE 1 TABLET (120 MG TOTAL) BY MOUTH 3 (THREE) TIMES DAILY BEFORE MEALS.    pen needle, diabetic (BD ULTRA-FINE VIRGILIO PEN NEEDLE) 32 gauge x 5/32" Ndle 1 application by Misc.(Non-Drug; Combo Route) route 3 (three) times daily.     Family History       Problem Relation (Age of Onset)    Diabetes Mother, Father    Heart disease Father    Mental illness Brother          Tobacco Use    Smoking status: Every Day     Packs/day: 0.75     Types: Cigarettes     Last attempt to quit: 4/5/2019     Years since quitting: 3.5    Smokeless tobacco: Never    Tobacco comments:      x 34 yr.  Works at [employer].  Three kids.  Walks a lot at work.     Substance and Sexual Activity    Alcohol use: Not Currently     Comment: only on occasion    Drug use: Never    Sexual activity: Yes     Partners: Female     Review of Systems   Constitutional:  Negative for diaphoresis and fever.   HENT:  Negative for drooling and trouble swallowing.    Eyes:  Negative for photophobia, discharge and visual disturbance.   Respiratory:  Positive for shortness of breath (intermittent, associated w/episodes of diarrhea). Negative for cough.    Cardiovascular:  Negative for chest pain and leg swelling.   Gastrointestinal:  Positive for abdominal pain, " diarrhea and vomiting.   Endocrine: Negative for cold intolerance and polyuria.   Genitourinary:  Negative for dysuria, flank pain and hematuria.   Musculoskeletal:  Negative for back pain and neck pain.   Skin:  Negative for rash and wound.   Allergic/Immunologic: Negative for environmental allergies and food allergies.   Neurological:  Positive for light-headedness (intermittent and associated w/episodes of diarrhea). Negative for headaches.   Hematological:  Negative for adenopathy. Does not bruise/bleed easily.   Psychiatric/Behavioral:  Negative for agitation and confusion.    Objective:     Vital Signs (Most Recent):  Temp: 98.3 °F (36.8 °C) (10/16/22 0020)  Pulse: 88 (10/16/22 0032)  Resp: 19 (10/16/22 0032)  BP: 113/71 (10/16/22 0032)  SpO2: 99 % (10/16/22 0032) Vital Signs (24h Range):  Temp:  [97.8 °F (36.6 °C)-98.3 °F (36.8 °C)] 98.3 °F (36.8 °C)  Pulse:  [] 88  Resp:  [14-22] 19  SpO2:  [97 %-100 %] 99 %  BP: ()/(56-77) 113/71     Weight: 81.6 kg (180 lb)  Body mass index is 26.58 kg/m².    Physical Exam  Vitals and nursing note reviewed.   Constitutional:       General: He is not in acute distress.     Appearance: He is not ill-appearing, toxic-appearing or diaphoretic.   HENT:      Head: Normocephalic and atraumatic.      Right Ear: External ear normal.      Left Ear: External ear normal.      Mouth/Throat:      Mouth: Mucous membranes are moist.   Eyes:      General: No scleral icterus.        Right eye: No discharge.         Left eye: No discharge.      Extraocular Movements: Extraocular movements intact.      Conjunctiva/sclera: Conjunctivae normal.      Pupils: Pupils are equal, round, and reactive to light.   Cardiovascular:      Rate and Rhythm: Normal rate and regular rhythm.   Pulmonary:      Effort: Pulmonary effort is normal. No respiratory distress.      Breath sounds: Normal breath sounds.   Chest:      Chest wall: No swelling or tenderness.   Abdominal:      General: Abdomen is  protuberant. Bowel sounds are decreased. There is no distension.      Palpations: Abdomen is soft.      Tenderness: There is no abdominal tenderness.   Genitourinary:     Comments: No davis in place  Musculoskeletal:      Cervical back: Normal range of motion and neck supple.      Right lower leg: No edema.      Left lower leg: No edema.   Skin:     General: Skin is warm and dry.   Neurological:      Mental Status: He is alert and oriented to person, place, and time.   Psychiatric:         Mood and Affect: Mood normal.         CRANIAL NERVES     CN III, IV, VI   Pupils are equal, round, and reactive to light.     Significant Labs: All pertinent labs within the past 24 hours have been reviewed.  Recent Results (from the past 24 hour(s))   CBC auto differential    Collection Time: 10/15/22  9:56 PM   Result Value Ref Range    WBC 12.09 3.90 - 12.70 K/uL    RBC 6.42 (H) 4.60 - 6.20 M/uL    Hemoglobin 18.8 (H) 14.0 - 18.0 g/dL    Hematocrit 52.7 40.0 - 54.0 %    MCV 82 82 - 98 fL    MCH 29.3 27.0 - 31.0 pg    MCHC 35.7 32.0 - 36.0 g/dL    RDW 13.4 11.5 - 14.5 %    Platelets 275 150 - 450 K/uL    MPV 9.7 9.2 - 12.9 fL    Immature Granulocytes CANCELED 0.0 - 0.5 %    Immature Grans (Abs) CANCELED 0.00 - 0.04 K/uL    Lymph # CANCELED 1.0 - 4.8 K/uL    Mono # CANCELED 0.3 - 1.0 K/uL    Eos # CANCELED 0.0 - 0.5 K/uL    Baso # CANCELED 0.00 - 0.20 K/uL    nRBC 0 0 /100 WBC    Gran % 48.0 38.0 - 73.0 %    Lymph % 30.0 18.0 - 48.0 %    Mono % 9.0 4.0 - 15.0 %    Eosinophil % 2.0 0.0 - 8.0 %    Basophil % 0.0 0.0 - 1.9 %    Bands 11.0 %    Differential Method Manual    Comprehensive metabolic panel    Collection Time: 10/15/22  9:56 PM   Result Value Ref Range    Sodium 129 (L) 136 - 145 mmol/L    Potassium 3.1 (L) 3.5 - 5.1 mmol/L    Chloride 90 (L) 95 - 110 mmol/L    CO2 18 (L) 23 - 29 mmol/L    Glucose 249 (H) 70 - 110 mg/dL    BUN 19 6 - 20 mg/dL    Creatinine 2.8 (H) 0.5 - 1.4 mg/dL    Calcium 9.1 8.7 - 10.5 mg/dL    Total  Protein 7.1 6.0 - 8.4 g/dL    Albumin 3.5 3.5 - 5.2 g/dL    Total Bilirubin 0.9 0.1 - 1.0 mg/dL    Alkaline Phosphatase 164 (H) 55 - 135 U/L    AST 9 (L) 10 - 40 U/L    ALT 17 10 - 44 U/L    Anion Gap 21 (H) 8 - 16 mmol/L    eGFR 25 (A) >60 mL/min/1.73 m^2   Troponin I    Collection Time: 10/15/22  9:56 PM   Result Value Ref Range    Troponin I <0.006 0.000 - 0.026 ng/mL   Lactic acid, plasma    Collection Time: 10/15/22  9:56 PM   Result Value Ref Range    Lactate (Lactic Acid) 4.0 (HH) 0.5 - 2.2 mmol/L   Lipase    Collection Time: 10/15/22  9:56 PM   Result Value Ref Range    Lipase 14 4 - 60 U/L   Lactic acid, plasma    Collection Time: 10/15/22 11:33 PM   Result Value Ref Range    Lactate (Lactic Acid) 5.0 (HH) 0.5 - 2.2 mmol/L   Magnesium    Collection Time: 10/15/22 11:33 PM   Result Value Ref Range    Magnesium 1.2 (L) 1.6 - 2.6 mg/dL   Phosphorus    Collection Time: 10/15/22 11:33 PM   Result Value Ref Range    Phosphorus 4.2 2.7 - 4.5 mg/dL   Occult blood x 1, stool    Collection Time: 10/16/22  1:08 AM    Specimen: Stool   Result Value Ref Range    Occult Blood Positive (A) Negative         Significant Imaging: I have reviewed all pertinent imaging results/findings within the past 24 hours.

## 2022-10-16 NOTE — PROGRESS NOTES
I have seen this patient, reviewed my colleague's history and physical, assessment and plan. I have personally interviewed and examined the patient at bedside and agree with the findings with the following comments/updates:    Hb dropped from 19.3 to 15.8.  GI consulted  LA increased from 4.0 to 5.0, US mesentery pending, will get blood cultures VBG and repeat LA, if still increasing will move to ICU.       UPDATE/ADDENDUM:   LA cleared well with fluids, will stay on floor.   Cdiff was positive  Will stop Ceft/Flagyl  Will start PO vanc alone  Advance diet as tolerated, no need for scope at this time      Carlos Hernandez MD  Internal Medicine Staff  [phone] pager

## 2022-10-16 NOTE — H&P
"[facility]  [facility] Medicine  History & Physical    Patient Name: Stanley Faustin  MRN: [id number]  Patient Class: IP- Inpatient  Admission Date: 10/15/2022  Attending Physician: Adriana Helm MD   Primary Care Provider: Masood Khan MD         Patient information was obtained from patient, past medical records and ER records.     Subjective:     Principal Problem:Enteritis    Chief Complaint:   Chief Complaint   Patient presents with    Abdominal Pain     Presents with one week history of diarrhea accompanied by nausea and vomiting, also reports shortness of breath and weakness, skin is cool and pale.  Seen here 2 days ago for similar complaint        HPI: Mr. Rose is a 63yo man with a past medical history of CAD, DM2, HLD, HTN, MI with PCI and stenting, and cluster headaches who presented to the hospital for evaluation of diarrhea.  The patient began to have diarrhea last Sunday, 2 episodes.  He states by Wednesday he was having multiple episodes, that he associated with anytime he ate or drank, which prompted him to present to the ED on Thursday for evaluation.      A CT abdomen pelvis was obtained at that time and showed a nonspecific diarrheal illness or enteritis.  He was discharged home on Cipro.  The patient continued to have episodes of diarrhea when he ate or drank and had x1 episode of vomiting today.  He returned to the ED for evaluation.    Currently the patient is alert and appears to be in no distress.  He denies CP, abdominal pain, SOB, nausea, or dizziness at this time.  He endorses feeling lightheaded and intermittently SOB when he has the diarrhea episodes and states he has diarrhea shortly after eating or drinking.  His abdomen is non distended, bowel sounds are decreased but he has no abdominal tenderness.    Today in the ED his vital signs were BP 89/56 -> 132/71   Pulse 123 -> 91   Temp 97.8 °F (36.6 °C) (Oral)   Resp 19   Ht 5' 9" (1.753 m)   Wt 81.6 kg (180 " lb)   SpO2 100% RA  BMI 26.58 kg/m².  Labs showed WBC 12, Hg 18.8, Na 129, K 3.1, Cr 1.5 yesterday -> 2.8 today (baseline 0.9), gluc 249, normal LFTs.  LA 1.8 yesterday -> 4 today.  EKG today showed sinus tachy, rate 127 with PVC's.    In the ED he was treated with LR 1L x 2, Morphine 6mg IV, and Zofran 4mg IV.    CT abdomen and pelvis 10/14/22: Minimal fluid distension of small bowel in the lower abdomen and liquid stool throughout the colon.  Findings may represent a nonspecific diarrheal illness or enteritis in the appropriate clinical setting.  Aortoiliac atherosclerosis with moderate narrowing of the right superficial femoral artery.  Hepatic steatosis.  Postoperative changes of prior median sternotomy.                  Past Medical History:   Diagnosis Date    Cluster headaches     Coronary artery disease     s/p stent    Diabetes mellitus     Diabetes mellitus type II     Headache     High cholesterol     Hyperlipidemia     Hypertension     Myocardial infarction        Past Surgical History:   Procedure Laterality Date    COLONOSCOPY N/A 4/1/2021    Procedure: COLONOSCOPY;  Surgeon: Bernard Leach MD;  Location: [facility] ENDO;  Service: Endoscopy;  Laterality: N/A;  covid test 3/29 lapalco, current smoker, prep instr emailed, 1 visitor policy explained -ml  3/30 pt understands earlier arrival time and earlier prep time -ml    CORONARY ANGIOPLASTY WITH STENT PLACEMENT      ENDOSCOPIC NASAL SEPTOPLASTY N/A 2/11/2020    Procedure: SEPTOPLASTY, NOSE, ENDOSCOPIC;  Surgeon: Clifton Angel MD;  Location: [facility] OR;  Service: ENT;  Laterality: N/A;  DISC LOADED BY VICENTE ON 2-5-2020  RN PRE OP 2-7-2020 CA  NEED H/P    FUNCTIONAL ENDOSCOPIC SINUS SURGERY (FESS) USING COMPUTER-ASSISTED NAVIGATION Bilateral 6/26/2018    Procedure: SINUS SURGERY FUNCTIONAL ENDOSCOPIC WITH NAVIGATION;  Surgeon: Sina Cortez MD;  Location: [facility] OR;  Service: ENT;  Laterality: Bilateral;    FUNCTIONAL ENDOSCOPIC SINUS SURGERY (FESS) USING  COMPUTER-ASSISTED NAVIGATION Bilateral 2/11/2020    Procedure: FESS, USING COMPUTER-ASSISTED NAVIGATION;  Surgeon: Clifton Angel MD;  Location: [facility] OR;  Service: ENT;  Laterality: Bilateral;    left shoulder      CA CABG, ARTERY-VEIN, FOUR  04/12/2019    Coronary Artery Bypass, 4    RECONSTRUCTION OF NOSE Bilateral 6/26/2018    Procedure: RECONSTRUCTION-NASAL;  Surgeon: Sina Cortez MD;  Location: [facility] OR;  Service: ENT;  Laterality: Bilateral;  MEDTRONIC  RN PREOP 6/20/2018    TONSILLECTOMY      TURBINATE RESECTION Bilateral 6/26/2018    Procedure: TURBINATE CAUTERY RESECTION WITH DEBRIDER (CRISTINA. MAXILLARY & CRISTINA. ETHMOID);  Surgeon: Sina Cortez MD;  Location: [facility] OR;  Service: ENT;  Laterality: Bilateral;    UVULOPALATOPHARYNGOPLASTY      for REJI       Review of patient's allergies indicates:  No Known Allergies    No current facility-administered medications on file prior to encounter.     Current Outpatient Medications on File Prior to Encounter   Medication Sig    aspirin (ECOTRIN) 81 MG EC tablet Take 81 mg by mouth once daily.    atorvastatin (LIPITOR) 40 MG tablet Take 1 tablet (40 mg total) by mouth once daily. (Patient taking differently: Take 40 mg by mouth every evening.)    ciprofloxacin HCl (CIPRO) 500 MG tablet Take 1 tablet (500 mg total) by mouth 2 (two) times daily. for 5 days    clotrimazole-betamethasone 1-0.05% (LOTRISONE) cream APPLY 1 TO 2 TIMES A DAY AS NEEDED    diclofenac sodium (VOLTAREN) 1 % Gel APPLY 2 G TOPICALLY 2 (TWO) TIMES DAILY. (Patient taking differently: Apply 2 g topically 2 (two) times daily. PRN)    empagliflozin (JARDIANCE) 25 mg tablet Take 1 tablet (25 mg total) by mouth once daily. (Patient taking differently: Take 25 mg by mouth once daily. QAM)    insulin degludec (TRESIBA FLEXTOUCH U-100) 100 unit/mL (3 mL) insulin pen Inject 35 Units into the skin once daily. (Patient taking differently: Inject 35 Units into the skin every evening.)    lisinopriL  "(PRINIVIL,ZESTRIL) 5 MG tablet Take 1 tablet (5 mg total) by mouth once daily. (Patient taking differently: Take 5 mg by mouth every morning.)    loperamide (IMODIUM) 2 mg capsule Take 1 capsule (2 mg total) by mouth 4 (four) times daily as needed for Diarrhea.    metFORMIN (GLUCOPHAGE) 1000 MG tablet Take 1 tablet (1,000 mg total) by mouth 2 (two) times daily with meals.    metoprolol tartrate (LOPRESSOR) 25 MG tablet Take 0.5 tablets (12.5 mg total) by mouth 2 (two) times daily.    nitroGLYCERIN (NITROSTAT) 0.4 MG SL tablet PLACE ONE TABLET UNDER THE TONGUE EVERY 5 MINUTES AS NEEDED    ondansetron (ZOFRAN-ODT) 4 MG TbDL Take 1 tablet (4 mg total) by mouth every 4 (four) hours as needed.    pantoprazole (PROTONIX) 40 MG tablet Take 1 tablet (40 mg total) by mouth daily as needed.    semaglutide (OZEMPIC) 2 mg/dose (8 mg/3 mL) PnIj Inject 2 mg into the skin every 7 days. (Patient taking differently: Inject 2 mg into the skin every 7 days. Thursday)    sildenafiL (VIAGRA) 100 MG tablet Take 1 tablet (100 mg total) by mouth daily as needed for Erectile Dysfunction.    blood sugar diagnostic Strp 1 strip by Misc.(Non-Drug; Combo Route) route 3 (three) times daily.    blood-glucose meter kit Use as instructed    buPROPion (WELLBUTRIN XL) 150 MG TB24 tablet TAKE 1 TABLET BY MOUTH TWICE A DAY    lancets Misc 1 application by Misc.(Non-Drug; Combo Route) route 3 (three) times daily.    nateglinide (STARLIX) 120 MG tablet TAKE 1 TABLET (120 MG TOTAL) BY MOUTH 3 (THREE) TIMES DAILY BEFORE MEALS.    pen needle, diabetic (BD ULTRA-FINE VIRGILIO PEN NEEDLE) 32 gauge x 5/32" Ndle 1 application by Misc.(Non-Drug; Combo Route) route 3 (three) times daily.     Family History       Problem Relation (Age of Onset)    Diabetes Mother, Father    Heart disease Father    Mental illness Brother          Tobacco Use    Smoking status: Every Day     Packs/day: 0.75     Types: Cigarettes     Last attempt to quit: 4/5/2019     Years since " quitting: 3.5    Smokeless tobacco: Never    Tobacco comments:      x 34 yr.  Works at [employer].  Three kids.  Walks a lot at work.     Substance and Sexual Activity    Alcohol use: Not Currently     Comment: only on occasion    Drug use: Never    Sexual activity: Yes     Partners: Female     Review of Systems   Constitutional:  Negative for diaphoresis and fever.   HENT:  Negative for drooling and trouble swallowing.    Eyes:  Negative for photophobia, discharge and visual disturbance.   Respiratory:  Positive for shortness of breath (intermittent, associated w/episodes of diarrhea). Negative for cough.    Cardiovascular:  Negative for chest pain and leg swelling.   Gastrointestinal:  Positive for abdominal pain, diarrhea and vomiting.   Endocrine: Negative for cold intolerance and polyuria.   Genitourinary:  Negative for dysuria, flank pain and hematuria.   Musculoskeletal:  Negative for back pain and neck pain.   Skin:  Negative for rash and wound.   Allergic/Immunologic: Negative for environmental allergies and food allergies.   Neurological:  Positive for light-headedness (intermittent and associated w/episodes of diarrhea). Negative for headaches.   Hematological:  Negative for adenopathy. Does not bruise/bleed easily.   Psychiatric/Behavioral:  Negative for agitation and confusion.    Objective:     Vital Signs (Most Recent):  Temp: 98.3 °F (36.8 °C) (10/16/22 0020)  Pulse: 88 (10/16/22 0032)  Resp: 19 (10/16/22 0032)  BP: 113/71 (10/16/22 0032)  SpO2: 99 % (10/16/22 0032) Vital Signs (24h Range):  Temp:  [97.8 °F (36.6 °C)-98.3 °F (36.8 °C)] 98.3 °F (36.8 °C)  Pulse:  [] 88  Resp:  [14-22] 19  SpO2:  [97 %-100 %] 99 %  BP: ()/(56-77) 113/71     Weight: 81.6 kg (180 lb)  Body mass index is 26.58 kg/m².    Physical Exam  Vitals and nursing note reviewed.   Constitutional:       General: He is not in acute distress.     Appearance: He is not ill-appearing, toxic-appearing or  diaphoretic.   HENT:      Head: Normocephalic and atraumatic.      Right Ear: External ear normal.      Left Ear: External ear normal.      Mouth/Throat:      Mouth: Mucous membranes are moist.   Eyes:      General: No scleral icterus.        Right eye: No discharge.         Left eye: No discharge.      Extraocular Movements: Extraocular movements intact.      Conjunctiva/sclera: Conjunctivae normal.      Pupils: Pupils are equal, round, and reactive to light.   Cardiovascular:      Rate and Rhythm: Normal rate and regular rhythm.   Pulmonary:      Effort: Pulmonary effort is normal. No respiratory distress.      Breath sounds: Normal breath sounds.   Chest:      Chest wall: No swelling or tenderness.   Abdominal:      General: Abdomen is protuberant. Bowel sounds are decreased. There is no distension.      Palpations: Abdomen is soft.      Tenderness: There is no abdominal tenderness.   Genitourinary:     Comments: No davis in place  Musculoskeletal:      Cervical back: Normal range of motion and neck supple.      Right lower leg: No edema.      Left lower leg: No edema.   Skin:     General: Skin is warm and dry.   Neurological:      Mental Status: He is alert and oriented to person, place, and time.   Psychiatric:         Mood and Affect: Mood normal.         CRANIAL NERVES     CN III, IV, VI   Pupils are equal, round, and reactive to light.     Significant Labs: All pertinent labs within the past 24 hours have been reviewed.  Recent Results (from the past 24 hour(s))   CBC auto differential    Collection Time: 10/15/22  9:56 PM   Result Value Ref Range    WBC 12.09 3.90 - 12.70 K/uL    RBC 6.42 (H) 4.60 - 6.20 M/uL    Hemoglobin 18.8 (H) 14.0 - 18.0 g/dL    Hematocrit 52.7 40.0 - 54.0 %    MCV 82 82 - 98 fL    MCH 29.3 27.0 - 31.0 pg    MCHC 35.7 32.0 - 36.0 g/dL    RDW 13.4 11.5 - 14.5 %    Platelets 275 150 - 450 K/uL    MPV 9.7 9.2 - 12.9 fL    Immature Granulocytes CANCELED 0.0 - 0.5 %    Immature Grans (Abs)  CANCELED 0.00 - 0.04 K/uL    Lymph # CANCELED 1.0 - 4.8 K/uL    Mono # CANCELED 0.3 - 1.0 K/uL    Eos # CANCELED 0.0 - 0.5 K/uL    Baso # CANCELED 0.00 - 0.20 K/uL    nRBC 0 0 /100 WBC    Gran % 48.0 38.0 - 73.0 %    Lymph % 30.0 18.0 - 48.0 %    Mono % 9.0 4.0 - 15.0 %    Eosinophil % 2.0 0.0 - 8.0 %    Basophil % 0.0 0.0 - 1.9 %    Bands 11.0 %    Differential Method Manual    Comprehensive metabolic panel    Collection Time: 10/15/22  9:56 PM   Result Value Ref Range    Sodium 129 (L) 136 - 145 mmol/L    Potassium 3.1 (L) 3.5 - 5.1 mmol/L    Chloride 90 (L) 95 - 110 mmol/L    CO2 18 (L) 23 - 29 mmol/L    Glucose 249 (H) 70 - 110 mg/dL    BUN 19 6 - 20 mg/dL    Creatinine 2.8 (H) 0.5 - 1.4 mg/dL    Calcium 9.1 8.7 - 10.5 mg/dL    Total Protein 7.1 6.0 - 8.4 g/dL    Albumin 3.5 3.5 - 5.2 g/dL    Total Bilirubin 0.9 0.1 - 1.0 mg/dL    Alkaline Phosphatase 164 (H) 55 - 135 U/L    AST 9 (L) 10 - 40 U/L    ALT 17 10 - 44 U/L    Anion Gap 21 (H) 8 - 16 mmol/L    eGFR 25 (A) >60 mL/min/1.73 m^2   Troponin I    Collection Time: 10/15/22  9:56 PM   Result Value Ref Range    Troponin I <0.006 0.000 - 0.026 ng/mL   Lactic acid, plasma    Collection Time: 10/15/22  9:56 PM   Result Value Ref Range    Lactate (Lactic Acid) 4.0 (HH) 0.5 - 2.2 mmol/L   Lipase    Collection Time: 10/15/22  9:56 PM   Result Value Ref Range    Lipase 14 4 - 60 U/L   Lactic acid, plasma    Collection Time: 10/15/22 11:33 PM   Result Value Ref Range    Lactate (Lactic Acid) 5.0 (HH) 0.5 - 2.2 mmol/L   Magnesium    Collection Time: 10/15/22 11:33 PM   Result Value Ref Range    Magnesium 1.2 (L) 1.6 - 2.6 mg/dL   Phosphorus    Collection Time: 10/15/22 11:33 PM   Result Value Ref Range    Phosphorus 4.2 2.7 - 4.5 mg/dL   Occult blood x 1, stool    Collection Time: 10/16/22  1:08 AM    Specimen: Stool   Result Value Ref Range    Occult Blood Positive (A) Negative         Significant Imaging: I have reviewed all pertinent imaging results/findings within  the past 24 hours.      Assessment/Plan:     * Enteritis  -Presented with diarrhea since Sunday.  Evaluated in the ED on Thursday and found to have enteritis on CT.  Discharged on Cipro but remained with multiple episodes of diarrhea.  -Lactate 5.0  -Stool studies pending  -Abdomen is soft and not tender  -He has questionable history of SBO in March, so repeating upright Abd Xray and CXR now  -GI consult, as been going on for a week, unrelentingly   -Stool studies and Cdiff EIA ordered  -Mesenteric doppler      cefTRIAXone (ROCEPHIN) 2 g/50 mL D5W IVPB, 2 g, Intravenous, Q24H    metronidazole IVPB 500 mg, 500 mg, Intravenous, Q8H    pantoprazole EC tablet 40 mg, 40 mg, Oral, Daily    bismuth subsalicylate 262 mg/15 mL suspension 30 mL, 30 mL, Oral, QID      Hypotension due to hypovolemia  -BP 89/56 in the ED, likely due to taking lisinopril in the setting of dehydration.  He received 2L IVFs in the ED.  -Hold lisinopril  -Continuing low dose BB starting tomorrow, as BP has normalized with IVF   -Risk of cardiac event in abruptly holding BB      0.9 % NaCl with KCl 20 mEq infusion, , Intravenous, Continuous    FRENCH (acute kidney injury)  Patient with acute kidney injury likely due to IVVD/dehydration and pre-renal azotemia FRENCH is currently worsening. Labs reviewed- Renal function/electrolytes with Estimated Creatinine Clearance: 28.1 mL/min (A) (based on SCr of 2.8 mg/dL (H)). according to latest data. Monitor urine output and serial BMP and adjust therapy as needed. Avoid nephrotoxins and renally dose meds for GFR listed above.     Most likely etiology is dehydration, hypotension in face of ACE-I use  Hopefully will improve with aggressive fluids, resolution of hypotension and holding Lisinopril  Avoid nephrotoxic meds    He did get CT abdomen with IV contrast on 10/14/22, so keep contrast nephropathy in back of mind    Type 2 diabetes mellitus with hyperglycemia, with long-term current use of insulin  -Glucose 249  in ED.  The patient is not currently tolerating PO well.    -Low correction SSI w/Accuchecks AC/HS  -Clear liquid diet for now    Hypokalemia  KCl 10 meq iv x 1  Added on Mg and Phos      0.9 % NaCl with KCl 20 mEq infusion, , Intravenous, Continuous, 100 cc/hr     Lactic acidosis  Possible etiologies:  -GI sepsis from infectious enteritis  -Hypotension from dehydration  -Glucophage use in face of FRENCH  -Ischemic bowel  -Continue IV fluids and follow  -Mesenteric doppler ordered for am     Latest Reference Range & Units 10/14/22 01:43 10/14/22 05:26 10/15/22 21:56 10/15/22 23:33   Lactate, Braulio 0.5 - 2.2 mmol/L 2.8 (H) 1.8 4.0 (HH) 5.0 (HH)       CAD (coronary artery disease)    aspirin chewable tablet 81 mg, 81 mg, Oral, Daily     atorvastatin tablet 40 mg, 40 mg, Oral, QHS     metoprolol tartrate (LOPRESSOR) split tablet 12.5 mg, 12.5 mg, Oral, BID    -Hold if hypotension returns    nitroGLYCERIN SL tablet 0.4 mg, 0.4 mg, Sublingual, Q5 Min PRN     Tobacco smoker, less than 10 cigarettes per day  -Discussed smoking cessation with the patient for 5 min      Essential hypertension, benign    metoprolol tartrate (LOPRESSOR) split tablet 12.5 mg, 12.5 mg, Oral, BID  Holding home Lisinopril given transient hypotension         VTE Risk Mitigation (From admission, onward)           Ordered     heparin (porcine) injection 5,000 Units  Every 8 hours         10/16/22 0041     Place sequential compression device  Until discontinued         10/16/22 0041     Place JOSHUA hose  Until discontinued         10/16/22 0041                       JEANINE Robert MD  Department of Hospital Medicine   [facility] - Emergency Dept

## 2022-10-16 NOTE — PLAN OF CARE
[facility] - Telemetry  Initial Discharge Assessment       Primary Care Provider: Masood Khan MD    Admission Diagnosis: Shortness of breath [R06.02]  Diarrhea [R19.7]  Chest pain [R07.9]    Admission Date: 10/15/2022  Expected Discharge Date: Pending    CM discussed discharge planning with pt. Assessment completed and role of CM discussed. Plan A ( home with family) and Plan B ( other- tbd).    Discharge Barriers Identified: (P) None    Payor: CIGNA / Plan: CIGNA OPEN ACCESS PLUS / Product Type: Commercial /     Extended Emergency Contact Information  Primary Emergency Contact: Tammie Faustin  Address: [address] [place] [address]  Home Phone: [phone]  Mobile Phone: [phone]  Relation: Spouse    Discharge Plan A: (P) Home with family  Discharge Plan B: (P) Other (tbd)      [id number] - [place], [place] - [address]  [address] [place] [address]  Phone: [phone] Fax: [phone]    [facility] [place] - [address]  [address] [facility] [address]  [place] [address]  Phone: [phone] Fax: [phone]    [facility] - [place] - [address]  [address]  [place] [address]  Phone: [phone] Fax: [phone]    [facility] - [place] - [address] [place] [address]  [address] [facility] [address]  Phone: [phone] Fax: [phone]      Initial Assessment (most recent)       Adult Discharge Assessment - 10/16/22 1457          Discharge Assessment    Assessment Type Discharge Planning Assessment     Confirmed/corrected address, phone number and insurance Yes     Confirmed Demographics Correct on Facesheet     Source of Information patient     When was your last doctors appointment? 08/23/22 (P)      Reason For Admission Enteritis (P)      Lives With parent(s);spouse (P)      Facility Arrived From: home (P)      Do you expect to return to your  current living situation? Yes (P)      Do you have help at home or someone to help you manage your care at home? Yes (P)      Who are your caregiver(s) and their phone number(s)? Tammie- wife [phone] and Joann - mother (P)      Prior to hospitilization cognitive status: Alert/Oriented (P)      Current cognitive status: Alert/Oriented (P)      Walking or Climbing Stairs Difficulty none (P)      Dressing/Bathing Difficulty none (P)      Equipment Currently Used at Home none (P)      Readmission within 30 days? No (P)      Patient currently being followed by outpatient case management? No (P)      Do you currently have service(s) that help you manage your care at home? No (P)      Do you take prescription medications? Yes (P)      Do you have prescription coverage? Yes (P)      Coverage [employer] Open Access Plus (P)      Do you have any problems affording any of your prescribed medications? No (P)      Who is going to help you get home at discharge? Tammie- wife (P)      How do you get to doctors appointments? car, drives self (P)      Are you on dialysis? No (P)      Do you take coumadin? No (P)      Discharge Plan A Home with family (P)      Discharge Plan B Other (P)    tbd    DME Needed Upon Discharge  none (P)      Discharge Plan discussed with: Patient (P)      Discharge Barriers Identified None (P)         Physical Activity    On average, how many days per week do you engage in moderate to strenuous exercise (like a brisk walk)? 0 days (P)      On average, how many minutes do you engage in exercise at this level? 0 min (P)         Financial Resource Strain    How hard is it for you to pay for the very basics like food, housing, medical care, and heating? Not hard at all (P)         Housing Stability    In the last 12 months, was there a time when you were not able to pay the mortgage or rent on time? No (P)      In the last 12 months, how many places have you lived? 2 (P)      In the last 12 months, was there a  time when you did not have a steady place to sleep or slept in a shelter (including now)? No (P)         Transportation Needs    In the past 12 months, has lack of transportation kept you from medical appointments or from getting medications? No (P)      In the past 12 months, has lack of transportation kept you from meetings, work, or from getting things needed for daily living? No (P)         Food Insecurity    Within the past 12 months, you worried that your food would run out before you got the money to buy more. Never true (P)      Within the past 12 months, the food you bought just didn't last and you didn't have money to get more. Never true (P)         Stress    Do you feel stress - tense, restless, nervous, or anxious, or unable to sleep at night because your mind is troubled all the time - these days? Not at all (P)         Social Connections    In a typical week, how many times do you talk on the phone with family, friends, or neighbors? More than three times a week (P)      How often do you get together with friends or relatives? More than three times a week (P)      How often do you attend Muslim or Synagogue services? Never (P)      Do you belong to any clubs or organizations such as Muslim groups, unions, fraternal or athletic groups, or school groups? No (P)      How often do you attend meetings of the clubs or organizations you belong to? Never (P)      Are you , , , , never , or living with a partner?  (P)         Alcohol Use    Q1: How often do you have a drink containing alcohol? Never (P)      Q2: How many drinks containing alcohol do you have on a typical day when you are drinking? Patient does not drink (P)      Q3: How often do you have six or more drinks on one occasion? Never (P)         Relationship/Environment    Name(s) of Who Lives With Patient Tammie- wife (P)

## 2022-10-16 NOTE — CONSULTS
[facility]  Gastroenterology  Consult Note    Patient Name: Stanley Faustin  MRN: [id number]  Admission Date: 10/15/2022  Hospital Length of Stay: 1 days  Code Status: Full Code   Attending Provider: Carlos Hernandez MD   Consulting Provider: John Leach MD  Primary Care Physician: Masood Khan MD  Principal Problem:Enteritis    Inpatient consult to Gastroenterology  Consult performed by: John Leach MD  Consult ordered by: NAYAN Robert MD      Subjective:     HPI: Stanley Faustin is a 62 y.o. male with history of CAD, DM2, HLD, HTN, MI with PCI and stenting, cluster headaches that was admitted with diarrhea. Last colonoscopy 4/2021 due to colon polyps  with 3 polyps removed (hyperplastic and adenomatous).  Of note patient is on ozempic and started this about a year ago.  He had a high grade SBO with imaging confirming and SBFT confirming but no distinct bowel wall thickening.  This resolved with NGT decompression with no recurrence and pt has no prior surgeries to suggest adhesions as a cause.  He was supposed to get CT 3 mos later but don't see that this was set up. In regards to DM, overall glucose elevated in 200s. His CT A/P 10/14/22 in ER c/w minimal fluid distension of SB in the lower abdomen and liquid stool throughout the colon.  Additional history of feeling lightheaded and intermittent SOB.  Discharged home on ciprofloxacin and then returned to ER. On arrival VSS significant for low BP, tachycardia. Labs showed dehydration with low sodium and potassium, elevated Cr, normal LFTs, albumin normal, lipase normal, lactic acid high, Hbg hemoconcentrated likely at 18.8. Stool studies (C diff and cultures) in process.  Pt now on rocephin and flagyl with peptobismol and mesenteric doppler US ordered with findings c/w SMA 50% stenosis and patent celiac. Hgb decreased to 15.8 and lactic acid increased from 4.0 to 5.0 and considering transfer to ICU.     Past Medical History:   Diagnosis  Date    Cluster headaches     Coronary artery disease     s/p stent    Diabetes mellitus     Diabetes mellitus type II     Headache     High cholesterol     Hyperlipidemia     Hypertension     Myocardial infarction      Past Surgical History:   Procedure Laterality Date    COLONOSCOPY N/A 4/1/2021    Procedure: COLONOSCOPY;  Surgeon: Bernard Leach MD;  Location: [facility] ENDO;  Service: Endoscopy;  Laterality: N/A;  covid test 3/29 lapalco, current smoker, prep instr emailed, 1 visitor policy explained -ml  3/30 pt understands earlier arrival time and earlier prep time -ml    CORONARY ANGIOPLASTY WITH STENT PLACEMENT      ENDOSCOPIC NASAL SEPTOPLASTY N/A 2/11/2020    Procedure: SEPTOPLASTY, NOSE, ENDOSCOPIC;  Surgeon: Clifton Angel MD;  Location: [facility] OR;  Service: ENT;  Laterality: N/A;  DISC LOADED BY [employer] ON 2-5-2020  RN PRE OP 2-7-2020 [place]  NEED H/P    FUNCTIONAL ENDOSCOPIC SINUS SURGERY (FESS) USING COMPUTER-ASSISTED NAVIGATION Bilateral 6/26/2018    Procedure: SINUS SURGERY FUNCTIONAL ENDOSCOPIC WITH NAVIGATION;  Surgeon: Sina Cortez MD;  Location: [facility] OR;  Service: ENT;  Laterality: Bilateral;    FUNCTIONAL ENDOSCOPIC SINUS SURGERY (FESS) USING COMPUTER-ASSISTED NAVIGATION Bilateral 2/11/2020    Procedure: FESS, USING COMPUTER-ASSISTED NAVIGATION;  Surgeon: Clifton Angel MD;  Location: [facility] OR;  Service: ENT;  Laterality: Bilateral;    left shoulder      CT CABG, ARTERY-VEIN, FOUR  04/12/2019    Coronary Artery Bypass, 4    RECONSTRUCTION OF NOSE Bilateral 6/26/2018    Procedure: RECONSTRUCTION-NASAL;  Surgeon: Sina Cortez MD;  Location: [facility] OR;  Service: ENT;  Laterality: Bilateral;  MEDTRONIC  RN PREOP 6/20/2018    TONSILLECTOMY      TURBINATE RESECTION Bilateral 6/26/2018    Procedure: TURBINATE CAUTERY RESECTION WITH DEBRIDER (CRISTINA. MAXILLARY & CRISTINA. ETHMOID);  Surgeon: Sina Cortez MD;  Location: [facility] OR;  Service: ENT;  Laterality: Bilateral;    UVULOPALATOPHARYNGOPLASTY      for REJI        Family History   Problem Relation Age of Onset    Diabetes Mother     Diabetes Father     Heart disease Father     Mental illness Brother         suicide    Cancer Neg Hx         prostate or colon       Social History     Socioeconomic History    Marital status:    Occupational History     Employer: [employer]   Tobacco Use    Smoking status: Every Day     Packs/day: 0.75     Types: Cigarettes     Last attempt to quit: 4/5/2019     Years since quitting: 3.5    Smokeless tobacco: Never    Tobacco comments:      x 34 yr.  Works at [employer].  Three kids.  Walks a lot at work.     Substance and Sexual Activity    Alcohol use: Not Currently     Comment: only on occasion    Drug use: Never    Sexual activity: Yes     Partners: Female       No current facility-administered medications on file prior to encounter.     Current Outpatient Medications on File Prior to Encounter   Medication Sig Dispense Refill    aspirin (ECOTRIN) 81 MG EC tablet Take 81 mg by mouth once daily.      atorvastatin (LIPITOR) 40 MG tablet Take 1 tablet (40 mg total) by mouth once daily. (Patient taking differently: Take 40 mg by mouth every evening.) 90 tablet 2    ciprofloxacin HCl (CIPRO) 500 MG tablet Take 1 tablet (500 mg total) by mouth 2 (two) times daily. for 5 days 10 tablet 0    clotrimazole-betamethasone 1-0.05% (LOTRISONE) cream APPLY 1 TO 2 TIMES A DAY AS NEEDED 45 g 2    diclofenac sodium (VOLTAREN) 1 % Gel APPLY 2 G TOPICALLY 2 (TWO) TIMES DAILY. (Patient taking differently: Apply 2 g topically 2 (two) times daily. PRN) 100 g 1    empagliflozin (JARDIANCE) 25 mg tablet Take 1 tablet (25 mg total) by mouth once daily. (Patient taking differently: Take 25 mg by mouth once daily. QAM) 90 tablet 3    insulin degludec (TRESIBA FLEXTOUCH U-100) 100 unit/mL (3 mL) insulin pen Inject 35 Units into the skin once daily. (Patient taking differently: Inject 35 Units into the skin every evening.) 10 pen 1     "lisinopriL (PRINIVIL,ZESTRIL) 5 MG tablet Take 1 tablet (5 mg total) by mouth once daily. (Patient taking differently: Take 5 mg by mouth every morning.) 90 tablet 3    loperamide (IMODIUM) 2 mg capsule Take 1 capsule (2 mg total) by mouth 4 (four) times daily as needed for Diarrhea. 12 capsule 1    metFORMIN (GLUCOPHAGE) 1000 MG tablet Take 1 tablet (1,000 mg total) by mouth 2 (two) times daily with meals. 180 tablet 3    metoprolol tartrate (LOPRESSOR) 25 MG tablet Take 0.5 tablets (12.5 mg total) by mouth 2 (two) times daily. 90 tablet 3    nitroGLYCERIN (NITROSTAT) 0.4 MG SL tablet PLACE ONE TABLET UNDER THE TONGUE EVERY 5 MINUTES AS NEEDED 25 tablet 2    ondansetron (ZOFRAN-ODT) 4 MG TbDL Take 1 tablet (4 mg total) by mouth every 4 (four) hours as needed. 20 tablet 1    pantoprazole (PROTONIX) 40 MG tablet Take 1 tablet (40 mg total) by mouth daily as needed. 90 tablet 2    semaglutide (OZEMPIC) 2 mg/dose (8 mg/3 mL) PnIj Inject 2 mg into the skin every 7 days. (Patient taking differently: Inject 2 mg into the skin every 7 days. Thursday) 9 mL 3    sildenafiL (VIAGRA) 100 MG tablet Take 1 tablet (100 mg total) by mouth daily as needed for Erectile Dysfunction. 30 tablet 5    blood sugar diagnostic Strp 1 strip by Misc.(Non-Drug; Combo Route) route 3 (three) times daily. 200 strip 5    blood-glucose meter kit Use as instructed 1 each 0    buPROPion (WELLBUTRIN XL) 150 MG TB24 tablet TAKE 1 TABLET BY MOUTH TWICE A DAY 30 tablet 3    lancets Misc 1 application by Misc.(Non-Drug; Combo Route) route 3 (three) times daily. 100 each 5    nateglinide (STARLIX) 120 MG tablet TAKE 1 TABLET (120 MG TOTAL) BY MOUTH 3 (THREE) TIMES DAILY BEFORE MEALS. 270 tablet 1    pen needle, diabetic (BD ULTRA-FINE VIRGILIO PEN NEEDLE) 32 gauge x 5/32" Ndle 1 application by Misc.(Non-Drug; Combo Route) route 3 (three) times daily. 200 each 3       Review of patient's allergies indicates:  No Known Allergies    Review of Systems "   Constitutional:  Negative for chills and fever.   Respiratory:  Negative for cough.    Cardiovascular:  Negative for chest pain.   Gastrointestinal:  Positive for abdominal pain, diarrhea, nausea and vomiting. Negative for blood in stool, constipation, heartburn and melena.   Musculoskeletal:  Negative for joint pain.   Neurological:  Positive for weakness.      Objective:     Vitals:    10/16/22 1136   BP: (!) 94/57   Pulse: 85   Resp: 18   Temp: 97.7 °F (36.5 °C)     Physical Exam  Constitutional:       Appearance: Normal appearance.   Eyes:      Extraocular Movements: Extraocular movements intact.   Cardiovascular:      Rate and Rhythm: Normal rate and regular rhythm.      Pulses: Normal pulses.      Heart sounds: Normal heart sounds. No murmur heard.  Abdominal:      General: There is distension (mild).      Palpations: Abdomen is soft.      Tenderness: There is no abdominal tenderness.   Musculoskeletal:      Cervical back: Normal range of motion and neck supple.   Neurological:      Mental Status: He is alert.        Significant Labs:  Recent Labs   Lab 10/14/22  0143 10/15/22  2156 10/16/22  0431   HGB 19.3* 18.8* 15.8       Lab Results   Component Value Date    WBC 10.96 10/16/2022    HGB 15.8 10/16/2022    HCT 44.7 10/16/2022    MCV 83 10/16/2022     10/16/2022       Lab Results   Component Value Date     (L) 10/16/2022    K 2.9 (L) 10/16/2022    CL 94 (L) 10/16/2022    CO2 23 10/16/2022    BUN 18 10/16/2022    CREATININE 2.3 (H) 10/16/2022    CALCIUM 8.0 (L) 10/16/2022    ANIONGAP 15 10/16/2022    ESTGFRAFRICA >60.0 04/20/2022    EGFRNONAA >60.0 04/20/2022       Lab Results   Component Value Date    ALT 14 10/16/2022    AST 10 10/16/2022    ALKPHOS 125 10/16/2022    BILITOT 0.5 10/16/2022       Lab Results   Component Value Date    INR 1.0 09/02/2020    INR 1.0 06/11/2018       Significant Imaging:  Reviewed pertinent radiology findings.     Assessment/Plan:     Stanley Faustin is a 62  y.o. male with history of colon polyps, CAD, DM2, HLD, HTN, MI with PCI and stenting, cluster headaches that was admitted with diarrhea with h/o high grade SBO txed conservatively without cause back in 3/2022. He was suppose to get repeat CT 3 mos later but don't see this done but adhesions unlikely given no prior surgeries. He had imaging showing diffuse dilation of SB in 3/2022 and now CT c/w possible findings in the mid SB and mesenteric US SMA 50% stenosis.  Scope to be determined after stool studies and SB imaging.    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 Please call with any additional questions, concerns or changes in the patient's clinical status.    John Leach MD  [facility]    Addendum  Stool C diff positive, hold off on further testing and plan to treat with oral vancomycin.     ANTONIO Leach

## 2022-10-16 NOTE — ASSESSMENT & PLAN NOTE
Possible etiologies:  -GI sepsis from infectious enteritis  -Hypotension from dehydration  -Glucophage use in face of FRENCH  -Ischemic bowel  -Continue IV fluids and follow       Latest Reference Range & Units 10/14/22 01:43 10/14/22 05:26 10/15/22 21:56 10/15/22 23:33   Lactate, Braulio 0.5 - 2.2 mmol/L 2.8 (H) 1.8 4.0 (HH) 5.0 (HH)

## 2022-10-16 NOTE — ASSESSMENT & PLAN NOTE
  aspirin chewable tablet 81 mg, 81 mg, Oral, Daily     atorvastatin tablet 40 mg, 40 mg, Oral, QHS     metoprolol tartrate (LOPRESSOR) split tablet 12.5 mg, 12.5 mg, Oral, BID    -Hold if hypotension returns    nitroGLYCERIN SL tablet 0.4 mg, 0.4 mg, Sublingual, Q5 Min PRN

## 2022-10-16 NOTE — ASSESSMENT & PLAN NOTE
-Presented with diarrhea since Sunday.  Evaluated in the ED on Thursday and found to have enteritis on CT.  Discharged on Cipro but remained with multiple episodes of diarrhea.  -Lactate 5.0  -Stool studies pending  -Abdomen is soft and not tender  -He has questionable history of SBO in March, so repeating upright Abd Xray and CXR now  -GI consult, as been going on for a week, unrelentingly   -Stool studies and Cdiff EIA ordered      cefTRIAXone (ROCEPHIN) 2 g/50 mL D5W IVPB, 2 g, Intravenous, Q24H    metronidazole IVPB 500 mg, 500 mg, Intravenous, Q8H    pantoprazole EC tablet 40 mg, 40 mg, Oral, Daily    bismuth subsalicylate 262 mg/15 mL suspension 30 mL, 30 mL, Oral, QID

## 2022-10-16 NOTE — ED NOTES
Pts stool sample is green and watery with bits of undigested food. He reports feeling better after IV fluids, but that he felt improved yesterday post IV fluids also.

## 2022-10-16 NOTE — ASSESSMENT & PLAN NOTE
KCl 10 meq iv x 1  Added on Mg and Phos      0.9 % NaCl with KCl 20 mEq infusion, , Intravenous, Continuous, 100 cc/hr

## 2022-10-16 NOTE — ASSESSMENT & PLAN NOTE
-BP 89/56 in the ED, likely due to taking lisinopril in the setting of dehydration.  He received 2L IVFs in the ED.  -Hold lisinopril  -Continuing low dose BB starting tomorrow, as BP has normalized with IVF   -Risk of cardiac event in abruptly holding BB      0.9 % NaCl with KCl 20 mEq infusion, , Intravenous, Continuous

## 2022-10-16 NOTE — ASSESSMENT & PLAN NOTE
Patient with acute kidney injury likely due to IVVD/dehydration and pre-renal azotemia FRENCH is currently worsening. Labs reviewed- Renal function/electrolytes with Estimated Creatinine Clearance: 28.1 mL/min (A) (based on SCr of 2.8 mg/dL (H)). according to latest data. Monitor urine output and serial BMP and adjust therapy as needed. Avoid nephrotoxins and renally dose meds for GFR listed above.     Most likely etiology is dehydration, hypotension in face of ACE-I use  Hopefully will improve with aggressive fluids, resolution of hypotension and holding Lisinopril  Avoid nephrotoxic meds    He did get CT abdomen with IV contrast on 10/14/22, so keep contrast nephropathy in back of mind

## 2022-10-16 NOTE — ED PROVIDER NOTES
Encounter Date: 10/15/2022    SCRIBE #1 NOTE: I, Adam Russell, am scribing for, and in the presence of,  Kody Dickson MD. I have scribed the following portions of the note - Other sections scribed: HPI, ROS.     History     Chief Complaint   Patient presents with    Abdominal Pain     Presents with one week history of diarrhea accompanied by nausea and vomiting, also reports shortness of breath and weakness, skin is cool and pale.  Seen here 2 days ago for similar complaint     Stanley Faustin is a 62 y. O male with a PMHx of DM type 2, CAD, HLD, HTN, MI, and small bowel obstruction in March, that comes to the ED complaining of diarrhea beginning 1 week ago. Patient reports he was recently seen for similar complaints 2 days ago, endorsing he received fluids and was prescribed imodium noting he felt better upon discharged, but reports his symptoms returned upon arriving home. Patient reports complaints of nausea, vomiting, diarrhea, generalized abdominal cramping, SOB, and decreased urine. The patient notes his diarrhea is exacerbated upon ingesting solids or liquids, stating he's having an episode approximately every 30 minutes. Imodium last taken at 7 PM with no immediate relief noted. No other medications taken PTA. No alleviating or exacerbating factors noted. Denies chest pain, blood in stool, hematuria, or other associated symptoms. No known allergies.    The history is provided by the patient. No  was used.   Review of patient's allergies indicates:  No Known Allergies  Past Medical History:   Diagnosis Date    Cluster headaches     Coronary artery disease     s/p stent    Diabetes mellitus     Diabetes mellitus type II     Headache     High cholesterol     Hyperlipidemia     Hypertension     Myocardial infarction      Past Surgical History:   Procedure Laterality Date    COLONOSCOPY N/A 4/1/2021    Procedure: COLONOSCOPY;  Surgeon: Bernard Leach MD;  Location: [facility];   Service: Endoscopy;  Laterality: N/A;  covid test 3/29 lapalco, current smoker, prep instr emailed, 1 visitor policy explained -ml  3/30 pt understands earlier arrival time and earlier prep time -ml    CORONARY ANGIOPLASTY WITH STENT PLACEMENT      ENDOSCOPIC NASAL SEPTOPLASTY N/A 2/11/2020    Procedure: SEPTOPLASTY, NOSE, ENDOSCOPIC;  Surgeon: Clifton Angel MD;  Location: [facility] OR;  Service: ENT;  Laterality: N/A;  DISC LOADED BY [employer] ON 2-5-2020  RN PRE OP 2-7-2020 [place]  NEED H/P    FUNCTIONAL ENDOSCOPIC SINUS SURGERY (FESS) USING COMPUTER-ASSISTED NAVIGATION Bilateral 6/26/2018    Procedure: SINUS SURGERY FUNCTIONAL ENDOSCOPIC WITH NAVIGATION;  Surgeon: Sina Cortez MD;  Location: [facility] OR;  Service: ENT;  Laterality: Bilateral;    FUNCTIONAL ENDOSCOPIC SINUS SURGERY (FESS) USING COMPUTER-ASSISTED NAVIGATION Bilateral 2/11/2020    Procedure: FESS, USING COMPUTER-ASSISTED NAVIGATION;  Surgeon: Clifton Angel MD;  Location: [facility] OR;  Service: ENT;  Laterality: Bilateral;    left shoulder      [place] CABG, ARTERY-VEIN, FOUR  04/12/2019    Coronary Artery Bypass, 4    RECONSTRUCTION OF NOSE Bilateral 6/26/2018    Procedure: RECONSTRUCTION-NASAL;  Surgeon: Sina Cortez MD;  Location: [facility] OR;  Service: ENT;  Laterality: Bilateral;  SETVI  RN PREOP 6/20/2018    TONSILLECTOMY      TURBINATE RESECTION Bilateral 6/26/2018    Procedure: TURBINATE CAUTERY RESECTION WITH DEBRIDER (CRISTINA. MAXILLARY & CRISTINA. ETHMOID);  Surgeon: Sina Cortez MD;  Location: [facility] OR;  Service: ENT;  Laterality: Bilateral;    UVULOPALATOPHARYNGOPLASTY      for REJI     Family History   Problem Relation Age of Onset    Diabetes Mother     Diabetes Father     Heart disease Father     Mental illness Brother         suicide    Cancer Neg Hx         prostate or colon     Social History     Tobacco Use    Smoking status: Every Day     Packs/day: 0.75     Types: Cigarettes     Last attempt to quit: 4/5/2019     Years since quitting: 3.5     Smokeless tobacco: Never    Tobacco comments:      x 34 yr.  Works at [employer].  Three kids.  Walks a lot at work.     Substance Use Topics    Alcohol use: Not Currently     Comment: only on occasion    Drug use: Never     Review of Systems   Constitutional: Negative.    HENT: Negative.     Eyes: Negative.    Respiratory:  Positive for shortness of breath.    Cardiovascular: Negative.    Gastrointestinal:  Positive for abdominal pain (generalized, cramping), diarrhea, nausea and vomiting. Negative for blood in stool.   Genitourinary:  Positive for decreased urine volume. Negative for dysuria and hematuria.   Musculoskeletal: Negative.    Skin: Negative.    Neurological: Negative.      Physical Exam     Initial Vitals [10/15/22 2132]   BP Pulse Resp Temp SpO2   128/77 (!) 119 (!) 22 97.8 °F (36.6 °C) 98 %      MAP       --         Physical Exam    Nursing note and vitals reviewed.  Constitutional: He appears well-developed. He is not diaphoretic. He appears distressed (moderately).   HENT:   Head: Normocephalic and atraumatic.   Nose: Nose normal.   Mouth/Throat: No oropharyngeal exudate.   Eyes: EOM are normal. Pupils are equal, round, and reactive to light.   Neck: Neck supple. No tracheal deviation present. No JVD present.   Normal range of motion.  Cardiovascular:  Regular rhythm, normal heart sounds and intact distal pulses.           Tachycardic   Pulmonary/Chest: Breath sounds normal. No respiratory distress. He has no wheezes. He has no rhonchi. He has no rales.   Abdominal: Abdomen is soft. Bowel sounds are normal. He exhibits no distension. There is abdominal tenderness (diffuse). There is no rebound and no guarding.   Musculoskeletal:         General: No tenderness or edema. Normal range of motion.      Cervical back: Normal range of motion and neck supple.     Neurological: He is alert and oriented to person, place, and time. He has normal strength.   Skin: Skin is warm and dry.  Capillary refill takes less than 2 seconds. No rash noted. No erythema.       ED Course   Procedures  Labs Reviewed   CBC W/ AUTO DIFFERENTIAL - Abnormal; Notable for the following components:       Result Value    RBC 6.42 (*)     Hemoglobin 18.8 (*)     All other components within normal limits   COMPREHENSIVE METABOLIC PANEL - Abnormal; Notable for the following components:    Sodium 129 (*)     Potassium 3.1 (*)     Chloride 90 (*)     CO2 18 (*)     Glucose 249 (*)     Creatinine 2.8 (*)     Alkaline Phosphatase 164 (*)     AST 9 (*)     Anion Gap 21 (*)     eGFR 25 (*)     All other components within normal limits   LACTIC ACID, PLASMA - Abnormal; Notable for the following components:    Lactate (Lactic Acid) 4.0 (*)     All other components within normal limits    Narrative:     Lactic Acid critical result(s) called and verbal readback obtained   from Yaneth LANE RN ED  by 1CD 10/15/2022 22:31   LACTIC ACID, PLASMA - Abnormal; Notable for the following components:    Lactate (Lactic Acid) 5.0 (*)     All other components within normal limits    Narrative:     Lactic Acid critical result(s) called and verbal readback obtained   from Ira CRAWFORD RN ED   by 1CD 10/16/2022 00:21   MAGNESIUM - Abnormal; Notable for the following components:    Magnesium 1.2 (*)     All other components within normal limits   C-REACTIVE PROTEIN - Abnormal; Notable for the following components:    CRP 19.8 (*)     All other components within normal limits    Narrative:     Release to patient->Immediate   OCCULT BLOOD X 1, STOOL - Abnormal; Notable for the following components:    Occult Blood Positive (*)     All other components within normal limits   CLOSTRIDIUM DIFFICILE   CULTURE, STOOL   ENTEROHEMORRHAGIC E.COLI   TROPONIN I   LIPASE   PHOSPHORUS   PROCALCITONIN    Narrative:     Release to patient->Immediate   MAGNESIUM   PHOSPHORUS   B-TYPE NATRIURETIC PEPTIDE   SEDIMENTATION RATE   WBC, STOOL   STOOL EXAM-OVA,CYSTS,PARASITES    HIV 1 / 2 ANTIBODY   URINALYSIS, REFLEX TO URINE CULTURE   COMPREHENSIVE METABOLIC PANEL   MAGNESIUM   PHOSPHORUS   CBC W/ AUTO DIFFERENTIAL          Imaging Results              X-Ray Abdomen Flat And Erect (Final result)  Result time 10/16/22 01:49:55      Final result by Marcelino Song MD (10/16/22 01:49:55)                   Impression:      Abdominal findings as above.      Electronically signed by: Marcelino Song  Date:    10/16/2022  Time:    01:49               Narrative:    EXAMINATION:  XR ABDOMEN FLAT AND ERECT    CLINICAL HISTORY:  enteritis with history of SBO in the past;    TECHNIQUE:  Flat and erect AP views of the abdomen were performed.    COMPARISON:  Abdominal radiograph March 11, 2022    FINDINGS:  AP portable radiographic examination is submitted, upright and supine views, 2 radiographs are submitted.  Limited imaging of the lung bases demonstrates postoperative cardiothoracic change and appearance likely relating to diminished depth of inspiration.    There is no evidence for free intraperitoneal air.  There is mild air seen throughout the colon, there is no abnormal bowel distention otherwise seen.  Calcifications of the pelvis likely relate to vascular calcifications and phleboliths.  The osseous structures demonstrate chronic change.                                       X-Ray Chest AP Portable (Final result)  Result time 10/16/22 01:48:17      Final result by Marcelino Song MD (10/16/22 01:48:17)                   Impression:      There is no radiographic evidence for acute intrathoracic process.      Electronically signed by: Marcelino Song  Date:    10/16/2022  Time:    01:48               Narrative:    EXAMINATION:  XR CHEST AP PORTABLE    CLINICAL HISTORY:  SHORTNESS OF BREATH;    TECHNIQUE:  Single frontal view of the chest was performed.    COMPARISON:  Chest radiograph April 7, 2021    FINDINGS:  Single portable chest view is submitted.  Postoperative change noted.  There  is mild diminished depth of inspiration, when accounting for this the appearance of the cardiomediastinal silhouette and intrathoracic appearance is stable, there is no evidence for superimposed confluent infiltrate or consolidation, significant pleural effusion or pneumothorax.  The osseous structures demonstrate chronic change.                                       Medications   0.9 % NaCl with KCl 20 mEq infusion (has no administration in time range)   metronidazole IVPB 500 mg (has no administration in time range)   cefTRIAXone (ROCEPHIN) 2 g/50 mL D5W IVPB (has no administration in time range)   bismuth subsalicylate 262 mg/15 mL suspension 30 mL (has no administration in time range)   atorvastatin tablet 40 mg (has no administration in time range)   metoprolol tartrate (LOPRESSOR) split tablet 12.5 mg (has no administration in time range)   nitroGLYCERIN SL tablet 0.4 mg (has no administration in time range)   pantoprazole EC tablet 40 mg (has no administration in time range)   aspirin chewable tablet 81 mg (has no administration in time range)   sodium chloride 0.9% flush 10 mL (has no administration in time range)   naloxone 0.4 mg/mL injection 0.02 mg (has no administration in time range)   heparin (porcine) injection 5,000 Units (has no administration in time range)   acetaminophen tablet 650 mg (has no administration in time range)   morphine injection 2 mg (has no administration in time range)   ondansetron injection 4 mg (has no administration in time range)   promethazine (PHENERGAN) 12.5 mg in dextrose 5 % 50 mL IVPB (has no administration in time range)   albuterol-ipratropium 2.5 mg-0.5 mg/3 mL nebulizer solution 3 mL (has no administration in time range)   melatonin tablet 6 mg (has no administration in time range)   aluminum-magnesium hydroxide-simethicone 200-200-20 mg/5 mL suspension 30 mL (has no administration in time range)   simethicone chewable tablet 80 mg (has no administration in time  range)   lactated ringers bolus 1,000 mL (0 mLs Intravenous Stopped 10/15/22 2257)   morphine injection 6 mg (6 mg Intravenous Given 10/15/22 2203)   ondansetron injection 4 mg (4 mg Intravenous Given 10/15/22 2203)   lactated ringers bolus 1,000 mL (0 mLs Intravenous Stopped 10/15/22 2330)   potassium chloride 10 mEq in 100 mL IVPB (10 mEq Intravenous New Bag 10/16/22 0102)     Medical Decision Making:   History:   Old Medical Records: I decided to obtain old medical records.  Clinical Tests:   Lab Tests: Ordered and Reviewed  Medical Tests: Ordered and Reviewed       MDM:    62 y.o.male with PMHx as noted above presents with abdominal pain, diarrhea. Physical exam as noted above.  ED workup remarkable for EKG-sinus tachycardia rate of 126 beats per minute, nonspecific ST and T-wave changes noted, similar to prior EKG, no STEMI, CBC: Hgb 18.8, CMP - Na 129, K 3.1, Chloride 90, HCO3 - 18, glucose 249, Cr 2.8, lipase - 14, LA - 4.0/5.0.  Pt presentation consistent with diarrheal illness, unclear etiology, does not appear bloody upon evaluation, C diff pending.  Patient with multiple electrolyte abnormalities here today despite intervening treatment yesterday in the emergency department.  Patient given 2 L IV fluids here with symptomatic improvement, vital sign improvement however lactate slightly increasing.  Given his multiple electrolyte abnormalities, worsening symptoms, will admit for further evaluation and management, GI consultation.  No evidence for sepsis or septic shock at this point, will continue to give IV fluid bolus and trend lactate as I anticipated will decline at the next check.  Patient's vital signs completely stable, do not suspect shock at this point, stable for floor admission.  At this time given patient's history, physical exam, and ED workup do not suspect pancreatitis, cholecystitis, appendicitis, SBO, diverticulitis, MI/ACS, acute hepatitis, pyelonephritis, ureterolithiasis, or any further  malignant cause.  Discussed diagnosis and further treatment with patient and family at bedside. All questions answered, patient transferred to floor improved and stable.       Scribe Attestation:   Scribe #1: I performed the above scribed service and the documentation accurately describes the services I performed. I attest to the accuracy of the note.                   Clinical Impression:   Final diagnoses:  [R06.02] Shortness of breath  [R19.7] Diarrhea        ED Disposition Condition    Admit         I, Kody Dickson M.D., personally performed the services described in this documentation. All medical record entries made by the scribe were at my direction and in my presence. I have reviewed the chart and agree that the record reflects my personal performance and is accurate and complete.          Kody Dickson MD  10/16/22 0209

## 2022-10-17 VITALS
HEART RATE: 96 BPM | OXYGEN SATURATION: 96 % | WEIGHT: 183.44 LBS | RESPIRATION RATE: 18 BRPM | DIASTOLIC BLOOD PRESSURE: 79 MMHG | TEMPERATURE: 98 F | BODY MASS INDEX: 27.17 KG/M2 | HEIGHT: 69 IN | SYSTOLIC BLOOD PRESSURE: 134 MMHG

## 2022-10-17 PROBLEM — K52.9 ENTERITIS: Status: RESOLVED | Noted: 2022-10-16 | Resolved: 2022-10-17

## 2022-10-17 PROBLEM — A04.72 C. DIFFICILE COLITIS: Status: ACTIVE | Noted: 2022-10-17

## 2022-10-17 LAB
ALBUMIN SERPL BCP-MCNC: 2.8 G/DL (ref 3.5–5.2)
ALP SERPL-CCNC: 119 U/L (ref 55–135)
ALT SERPL W/O P-5'-P-CCNC: 16 U/L (ref 10–44)
ANION GAP SERPL CALC-SCNC: 14 MMOL/L (ref 8–16)
AST SERPL-CCNC: 19 U/L (ref 10–40)
BASOPHILS # BLD AUTO: 0.04 K/UL (ref 0–0.2)
BASOPHILS NFR BLD: 0.4 % (ref 0–1.9)
BILIRUB SERPL-MCNC: 0.6 MG/DL (ref 0.1–1)
BUN SERPL-MCNC: 10 MG/DL (ref 6–20)
CALCIUM SERPL-MCNC: 8.4 MG/DL (ref 8.7–10.5)
CHLORIDE SERPL-SCNC: 102 MMOL/L (ref 95–110)
CO2 SERPL-SCNC: 23 MMOL/L (ref 23–29)
CREAT SERPL-MCNC: 1.2 MG/DL (ref 0.5–1.4)
DIFFERENTIAL METHOD: ABNORMAL
EOSINOPHIL # BLD AUTO: 3 K/UL (ref 0–0.5)
EOSINOPHIL NFR BLD: 30.8 % (ref 0–8)
ERYTHROCYTE [DISTWIDTH] IN BLOOD BY AUTOMATED COUNT: 13.2 % (ref 11.5–14.5)
EST. GFR  (NO RACE VARIABLE): >60 ML/MIN/1.73 M^2
GLUCOSE SERPL-MCNC: 110 MG/DL (ref 70–110)
HCT VFR BLD AUTO: 44 % (ref 40–54)
HGB BLD-MCNC: 15.6 G/DL (ref 14–18)
IMM GRANULOCYTES # BLD AUTO: 0.17 K/UL (ref 0–0.04)
IMM GRANULOCYTES NFR BLD AUTO: 1.7 % (ref 0–0.5)
LYMPHOCYTES # BLD AUTO: 2.6 K/UL (ref 1–4.8)
LYMPHOCYTES NFR BLD: 26.6 % (ref 18–48)
MAGNESIUM SERPL-MCNC: 2.2 MG/DL (ref 1.6–2.6)
MCH RBC QN AUTO: 29.9 PG (ref 27–31)
MCHC RBC AUTO-ENTMCNC: 35.5 G/DL (ref 32–36)
MCV RBC AUTO: 84 FL (ref 82–98)
MONOCYTES # BLD AUTO: 0.7 K/UL (ref 0.3–1)
MONOCYTES NFR BLD: 7.4 % (ref 4–15)
NEUTROPHILS # BLD AUTO: 3.2 K/UL (ref 1.8–7.7)
NEUTROPHILS NFR BLD: 33.1 % (ref 38–73)
NRBC BLD-RTO: 0 /100 WBC
PLATELET # BLD AUTO: 192 K/UL (ref 150–450)
PMV BLD AUTO: 9.6 FL (ref 9.2–12.9)
POCT GLUCOSE: 105 MG/DL (ref 70–110)
POCT GLUCOSE: 162 MG/DL (ref 70–110)
POTASSIUM SERPL-SCNC: 4 MMOL/L (ref 3.5–5.1)
PROT SERPL-MCNC: 5.4 G/DL (ref 6–8.4)
RBC # BLD AUTO: 5.22 M/UL (ref 4.6–6.2)
SODIUM SERPL-SCNC: 139 MMOL/L (ref 136–145)
WBC # BLD AUTO: 9.73 K/UL (ref 3.9–12.7)

## 2022-10-17 PROCEDURE — 83735 ASSAY OF MAGNESIUM: CPT | Performed by: STUDENT IN AN ORGANIZED HEALTH CARE EDUCATION/TRAINING PROGRAM

## 2022-10-17 PROCEDURE — 63600175 PHARM REV CODE 636 W HCPCS: Performed by: INTERNAL MEDICINE

## 2022-10-17 PROCEDURE — 80053 COMPREHEN METABOLIC PANEL: CPT | Performed by: STUDENT IN AN ORGANIZED HEALTH CARE EDUCATION/TRAINING PROGRAM

## 2022-10-17 PROCEDURE — 63600175 PHARM REV CODE 636 W HCPCS: Performed by: STUDENT IN AN ORGANIZED HEALTH CARE EDUCATION/TRAINING PROGRAM

## 2022-10-17 PROCEDURE — 99233 SBSQ HOSP IP/OBS HIGH 50: CPT | Mod: ,,, | Performed by: NURSE PRACTITIONER

## 2022-10-17 PROCEDURE — 99233 PR SUBSEQUENT HOSPITAL CARE,LEVL III: ICD-10-PCS | Mod: ,,, | Performed by: NURSE PRACTITIONER

## 2022-10-17 PROCEDURE — 85025 COMPLETE CBC W/AUTO DIFF WBC: CPT | Performed by: STUDENT IN AN ORGANIZED HEALTH CARE EDUCATION/TRAINING PROGRAM

## 2022-10-17 PROCEDURE — 25000003 PHARM REV CODE 250: Performed by: INTERNAL MEDICINE

## 2022-10-17 PROCEDURE — 25000003 PHARM REV CODE 250: Performed by: STUDENT IN AN ORGANIZED HEALTH CARE EDUCATION/TRAINING PROGRAM

## 2022-10-17 PROCEDURE — 36415 COLL VENOUS BLD VENIPUNCTURE: CPT | Performed by: STUDENT IN AN ORGANIZED HEALTH CARE EDUCATION/TRAINING PROGRAM

## 2022-10-17 PROCEDURE — A4216 STERILE WATER/SALINE, 10 ML: HCPCS | Performed by: STUDENT IN AN ORGANIZED HEALTH CARE EDUCATION/TRAINING PROGRAM

## 2022-10-17 RX ORDER — DICLOFENAC SODIUM 10 MG/G
2 GEL TOPICAL 2 TIMES DAILY
Start: 2022-10-17 | End: 2022-11-03 | Stop reason: SDUPTHER

## 2022-10-17 RX ORDER — DIPHENHYDRAMINE HCL 25 MG
25 CAPSULE ORAL EVERY 6 HOURS PRN
Status: DISCONTINUED | OUTPATIENT
Start: 2022-10-17 | End: 2022-10-17 | Stop reason: HOSPADM

## 2022-10-17 RX ORDER — SEMAGLUTIDE 2.68 MG/ML
2 INJECTION, SOLUTION SUBCUTANEOUS
Start: 2022-10-17 | End: 2023-01-15

## 2022-10-17 RX ORDER — ENOXAPARIN SODIUM 100 MG/ML
40 INJECTION SUBCUTANEOUS EVERY 24 HOURS
Status: DISCONTINUED | OUTPATIENT
Start: 2022-10-17 | End: 2022-10-17 | Stop reason: HOSPADM

## 2022-10-17 RX ADMIN — Medication 30 ML: at 06:10

## 2022-10-17 RX ADMIN — Medication 500 MG: at 12:10

## 2022-10-17 RX ADMIN — HEPARIN SODIUM 5000 UNITS: 5000 INJECTION INTRAVENOUS; SUBCUTANEOUS at 06:10

## 2022-10-17 RX ADMIN — Medication 500 MG: at 06:10

## 2022-10-17 RX ADMIN — ASPIRIN 81 MG CHEWABLE TABLET 81 MG: 81 TABLET CHEWABLE at 08:10

## 2022-10-17 NOTE — DISCHARGE SUMMARY
"[facility] Medicine  Discharge Summary      Patient Name: Stanley Faustin  MRN: [id number]  Patient Class: IP- Inpatient  Admission Date: 10/15/2022  Hospital Length of Stay: 2 days  Discharge Date and Time:  10/17/2022 9:31 AM  Attending Physician: Natalie Goodwin MD   Discharging Provider: Natalie Goodwin MD  Primary Care Provider: Masood Khan MD      HPI:   Mr. Rose is a 63yo man with a past medical history of CAD, DM2, HLD, HTN, MI with PCI and stenting, and cluster headaches who presented to the hospital for evaluation of diarrhea.  The patient began to have diarrhea last Sunday, 2 episodes.  He states by Wednesday he was having multiple episodes, that he associated with anytime he ate or drank, which prompted him to present to the ED on Thursday for evaluation.      A CT abdomen pelvis was obtained at that time and showed a nonspecific diarrheal illness or enteritis.  He was discharged home on Cipro.  The patient continued to have episodes of diarrhea when he ate or drank and had x1 episode of vomiting today.  He returned to the ED for evaluation.    Currently the patient is alert and appears to be in no distress.  He denies CP, abdominal pain, SOB, nausea, or dizziness at this time.  He endorses feeling lightheaded and intermittently SOB when he has the diarrhea episodes and states he has diarrhea shortly after eating or drinking.  His abdomen is non distended, bowel sounds are decreased but he has no abdominal tenderness.    Today in the ED his vital signs were BP 89/56 -> 132/71   Pulse 123 -> 91   Temp 97.8 °F (36.6 °C) (Oral)   Resp 19   Ht 5' 9" (1.753 m)   Wt 81.6 kg (180 lb)   SpO2 100% RA  BMI 26.58 kg/m².  Labs showed WBC 12, Hg 18.8, Na 129, K 3.1, Cr 1.5 yesterday -> 2.8 today (baseline 0.9), gluc 249, normal LFTs.  LA 1.8 yesterday -> 4 today.  EKG today showed sinus tachy, rate 127 with PVC's.    In the ED he was treated with LR 1L x 2, Morphine 6mg IV, and " Zofran 4mg IV.    CT abdomen and pelvis 10/14/22: Minimal fluid distension of small bowel in the lower abdomen and liquid stool throughout the colon.  Findings may represent a nonspecific diarrheal illness or enteritis in the appropriate clinical setting.  Aortoiliac atherosclerosis with moderate narrowing of the right superficial femoral artery.  Hepatic steatosis.  Postoperative changes of prior median sternotomy.                  * No surgery found *      Hospital Course:   Mr Stanley Faustin was admitted with diarrhea and FRENCH. Cdiff positive. Started PO vanc. Symptoms improving, diarrhea decreased, no abdominal pain, tolerating diet. FRENCH resolved. Stable for discharge to home with PO vanc x10 days. Follow up with PCP.      After discharge and prior to leaving the hospital, patient reported a rash. He has a erythematous blanching plaque rash in groin, upper thighs both anterior and posterior. No pruritis. No mucus membrane/palm/sole involvement. PO vanc is not associated with rash. Given the distribution of the rash, I highly suspect it is associated with contact allergy- perhaps from cleaning after episode of diarrhea. This rash is not spreading. OK for discharge to home. Told him to follow up with PCP if rash is worsening. OK to use benadryl if needed. Plan of care discussed with patient and his daughter at bedside.     Goals of Care Treatment Preferences:  Code Status: Full Code      Consults:   Consults (From admission, onward)          Status Ordering Provider     Inpatient consult to Gastroenterology  Once        Provider:  John Leach MD    Completed NAYAN DAVILA            No new Assessment & Plan notes have been filed under this hospital service since the last note was generated.  Service: Hospital Medicine    Final Active Diagnoses:    Diagnosis Date Noted POA    PRINCIPAL PROBLEM:  C. difficile colitis [A04.72] 10/17/2022 Yes    Hypotension due to hypovolemia [I95.89, E86.1] 10/16/2022 Yes     FRENCH (acute kidney injury) [N17.9] 10/16/2022 Yes    Hypokalemia [E87.6] 10/16/2022 Yes    Lactic acidosis [E87.20] 10/16/2022 Yes    Type 2 diabetes mellitus with hyperglycemia, with long-term current use of insulin [E11.65, Z79.4] 04/22/2019 Not Applicable    Tobacco smoker, less than 10 cigarettes per day [F17.210] 04/22/2019 Yes    Essential hypertension, benign [I10] 04/22/2019 Yes     Chronic    CAD (coronary artery disease) [I25.10] 10/01/2012 Yes     Chronic      Problems Resolved During this Admission:    Diagnosis Date Noted Date Resolved POA    Enteritis [K52.9] 10/16/2022 10/17/2022 Yes       Discharged Condition: good    Disposition: Home or Self Care    Follow Up: with PCP    Patient Instructions:      Diet diabetic     Notify your health care provider if you experience any of the following:  temperature >100.4     Notify your health care provider if you experience any of the following:  persistent nausea and vomiting or diarrhea     Notify your health care provider if you experience any of the following:  severe uncontrolled pain     Notify your health care provider if you experience any of the following:  redness, tenderness, or signs of infection (pain, swelling, redness, odor or green/yellow discharge around incision site)     Notify your health care provider if you experience any of the following:  difficulty breathing or increased cough     Notify your health care provider if you experience any of the following:  severe persistent headache     Notify your health care provider if you experience any of the following:  worsening rash     Notify your health care provider if you experience any of the following:  persistent dizziness, light-headedness, or visual disturbances     Notify your health care provider if you experience any of the following:  increased confusion or weakness     Activity as tolerated       Significant Diagnostic Studies: Labs: All labs within the past 24 hours have been  reviewed    Pending Diagnostic Studies:       Procedure Component Value Units Date/Time    Stool Exam-Ova,Cysts,Parasites [[id number]] Collected: 10/16/22 0108    Order Status: Sent Lab Status: In process Updated: 10/16/22 0112    Specimen: Stool            Medications:  Reconciled Home Medications:      Medication List        START taking these medications      vancomycin 125 mg/5 mL Soln  Take 20 mLs (500 mg total) by mouth every 6 (six) hours. for 10 days            CHANGE how you take these medications      atorvastatin 40 MG tablet  Commonly known as: LIPITOR  Take 1 tablet (40 mg total) by mouth once daily.  What changed: when to take this     lisinopriL 5 MG tablet  Commonly known as: PRINIVIL,ZESTRIL  Take 1 tablet (5 mg total) by mouth once daily.  What changed: when to take this     TRESIBA FLEXTOUCH U-100 100 unit/mL (3 mL) insulin pen  Generic drug: insulin degludec  Inject 35 Units into the skin once daily.  What changed: when to take this            CONTINUE taking these medications      aspirin 81 MG EC tablet  Commonly known as: ECOTRIN  Take 81 mg by mouth once daily.     blood sugar diagnostic Strp  1 strip by Misc.(Non-Drug; Combo Route) route 3 (three) times daily.     blood-glucose meter kit  Use as instructed     clotrimazole-betamethasone 1-0.05% cream  Commonly known as: LOTRISONE  APPLY 1 TO 2 TIMES A DAY AS NEEDED     diclofenac sodium 1 % Gel  Commonly known as: VOLTAREN  Apply 2 g topically 2 (two) times daily. PRN     empagliflozin 25 mg tablet  Commonly known as: JARDIANCE  Take 1 tablet (25 mg total) by mouth once daily. QAM     lancets Misc  1 application by Misc.(Non-Drug; Combo Route) route 3 (three) times daily.     metFORMIN 1000 MG tablet  Commonly known as: GLUCOPHAGE  Take 1 tablet (1,000 mg total) by mouth 2 (two) times daily with meals.     metoprolol tartrate 25 MG tablet  Commonly known as: LOPRESSOR  Take 0.5 tablets (12.5 mg total) by mouth 2 (two) times daily.    "  nitroGLYCERIN 0.4 MG SL tablet  Commonly known as: NITROSTAT  PLACE ONE TABLET UNDER THE TONGUE EVERY 5 MINUTES AS NEEDED     ondansetron 4 MG Tbdl  Commonly known as: ZOFRAN-ODT  Take 1 tablet (4 mg total) by mouth every 4 (four) hours as needed.     OZEMPIC 2 mg/dose (8 mg/3 mL) Pnij  Generic drug: semaglutide  Inject 2 mg into the skin every 7 days. Thursday     pantoprazole 40 MG tablet  Commonly known as: PROTONIX  Take 1 tablet (40 mg total) by mouth daily as needed.     pen needle, diabetic 32 gauge x 5/32" Ndle  Commonly known as: BD ULTRA-FINE VIRGILIO PEN NEEDLE  1 application by Misc.(Non-Drug; Combo Route) route 3 (three) times daily.     sildenafiL 100 MG tablet  Commonly known as: VIAGRA  Take 1 tablet (100 mg total) by mouth daily as needed for Erectile Dysfunction.            STOP taking these medications      buPROPion 150 MG TB24 tablet  Commonly known as: WELLBUTRIN XL     ciprofloxacin HCl 500 MG tablet  Commonly known as: CIPRO     loperamide 2 mg capsule  Commonly known as: IMODIUM     nateglinide 120 MG tablet  Commonly known as: STARLIX              Indwelling Lines/Drains at time of discharge:   Lines/Drains/Airways       None                   Time spent on the discharge of patient: 35 minutes         Natalie Goodwin MD  Department of [facility] Medicine  [facility] [place] [facility]  "

## 2022-10-17 NOTE — NURSING
Patient IV removed. Tolerated well. All personal belongings with patient at the time of discharge. Walked independently off unit.

## 2022-10-17 NOTE — NURSING
Patient has  a red botchy areas to trunk area. Areas does not itch ,patient stated he noticed areas when he was getting dressed.Dr. Goodwin notified and assessed patient t.

## 2022-10-17 NOTE — PLAN OF CARE
Problem: Adult Inpatient Plan of Care  Goal: Plan of Care Review  Outcome: Met  Goal: Patient-Specific Goal (Individualized)  Outcome: Met  Goal: Absence of Hospital-Acquired Illness or Injury  Outcome: Met  Goal: Optimal Comfort and Wellbeing  Outcome: Met  Goal: Readiness for Transition of Care  Outcome: Met     Problem: Fluid and Electrolyte Imbalance (Acute Kidney Injury/Impairment)  Goal: Fluid and Electrolyte Balance  Outcome: Met     Problem: Oral Intake Inadequate (Acute Kidney Injury/Impairment)  Goal: Optimal Nutrition Intake  Outcome: Met     Problem: Renal Function Impairment (Acute Kidney Injury/Impairment)  Goal: Effective Renal Function  Outcome: Met     Problem: Infection  Goal: Absence of Infection Signs and Symptoms  Outcome: Adequate for Care Transition

## 2022-10-17 NOTE — PLAN OF CARE
[facility] - Telemetry  Discharge Final Note    Primary Care Provider: Masood Khan MD    Expected Discharge Date: 10/17/2022    All needs met. Appointment scheduled. SW notified nurse Reanna that patient is ready for discharge from case management standpoint.     Final Discharge Note (most recent)       Final Note - 10/17/22 0944          Final Note    Assessment Type Final Discharge Note     Anticipated Discharge Disposition Home or Self Care     What phone number can be called within the next 1-3 days to see how you are doing after discharge? [phone]     Hospital Resources/Appts/Education Provided Appointments scheduled and added to AVS;Appointments scheduled by Navigator/Coordinator        Post-Acute Status    Post-Acute Authorization Other     Coverage [employer] open Access Plus     Other Status No Post-Acute Service Needs     Discharge Delays None known at this time                     Important Message from Medicare

## 2022-10-17 NOTE — NURSING
Patient discharged to home with family. Patient  Given discharge instructions and medication record. Patient educated on all contents . Patient and wife education on th importance of handwashing and infection control . Patient given discharge education on C Difficile. Education on content verbalized understanding.

## 2022-10-17 NOTE — PROGRESS NOTES
[facility]  Gastroenterology  Consult Note    Patient Name: Stanley Faustin  MRN: [id number]  Admission Date: 10/15/2022  Hospital Length of Stay: 2 days  Code Status: Full Code   Attending Provider: Natalie Goodwin MD   Consulting Provider: Agustina Zavala NP  Primary Care Physician: Masood Khan MD  Principal Problem:C. difficile colitis        Subjective:     HPI: Stanley Faustin is a 62 y.o. male with history of CAD, DM2, HLD, HTN, MI with PCI and stenting, cluster headaches that was admitted with diarrhea. Last colonoscopy 4/2021 due to colon polyps  with 3 polyps removed (hyperplastic and adenomatous).  Of note patient is on ozempic and started this about a year ago.  He had a high grade SBO with imaging confirming and SBFT confirming but no distinct bowel wall thickening.  This resolved with NGT decompression with no recurrence and pt has no prior surgeries to suggest adhesions as a cause.  He was supposed to get CT 3 mos later but don't see that this was set up. In regards to DM, overall glucose elevated in 200s. His CT A/P 10/14/22 in ER c/w minimal fluid distension of SB in the lower abdomen and liquid stool throughout the colon.  Additional history of feeling lightheaded and intermittent SOB.  Discharged home on ciprofloxacin and then returned to ER. On arrival VSS significant for low BP, tachycardia. Labs showed dehydration with low sodium and potassium, elevated Cr, normal LFTs, albumin normal, lipase normal, lactic acid high, Hbg hemoconcentrated likely at 18.8. Stool studies (C diff and cultures) in process.  Pt now on rocephin and flagyl with peptobismol and mesenteric doppler US ordered with findings c/w SMA 50% stenosis and patent celiac. Hgb decreased to 15.8 and lactic acid increased from 4.0 to 5.0 and considering transfer to ICU.     Interval Hx  Patient stable and ambulating around room to and from the bathroom. Reports abdominal pain is resolved. Still having diarrhea with  frequent stool however volume of stool has lessened. Labs have normalized and lactic is wnl.    Past Medical History:   Diagnosis Date    Cluster headaches     Coronary artery disease     s/p stent    Diabetes mellitus     Diabetes mellitus type II     Headache     High cholesterol     Hyperlipidemia     Hypertension     Myocardial infarction      Past Surgical History:   Procedure Laterality Date    COLONOSCOPY N/A 4/1/2021    Procedure: COLONOSCOPY;  Surgeon: Bernard Leach MD;  Location: [facility] ENDO;  Service: Endoscopy;  Laterality: N/A;  covid test 3/29 lapalco, current smoker, prep instr emailed, 1 visitor policy explained -ml  3/30 pt understands earlier arrival time and earlier prep time -ml    CORONARY ANGIOPLASTY WITH STENT PLACEMENT      ENDOSCOPIC NASAL SEPTOPLASTY N/A 2/11/2020    Procedure: SEPTOPLASTY, NOSE, ENDOSCOPIC;  Surgeon: Clifton Angel MD;  Location: [facility] OR;  Service: ENT;  Laterality: N/A;  DISC LOADED BY [employer] ON 2-5-2020  RN PRE OP 2-7-2020 [place]  NEED H/P    FUNCTIONAL ENDOSCOPIC SINUS SURGERY (FESS) USING COMPUTER-ASSISTED NAVIGATION Bilateral 6/26/2018    Procedure: SINUS SURGERY FUNCTIONAL ENDOSCOPIC WITH NAVIGATION;  Surgeon: Sina Cortez MD;  Location: [facility] OR;  Service: ENT;  Laterality: Bilateral;    FUNCTIONAL ENDOSCOPIC SINUS SURGERY (FESS) USING COMPUTER-ASSISTED NAVIGATION Bilateral 2/11/2020    Procedure: FESS, USING COMPUTER-ASSISTED NAVIGATION;  Surgeon: Clifton Angel MD;  Location: [facility] OR;  Service: ENT;  Laterality: Bilateral;    left shoulder      OK CABG, ARTERY-VEIN, FOUR  04/12/2019    Coronary Artery Bypass, 4    RECONSTRUCTION OF NOSE Bilateral 6/26/2018    Procedure: RECONSTRUCTION-NASAL;  Surgeon: Sina Cortez MD;  Location: [facility] OR;  Service: ENT;  Laterality: Bilateral;  [employer]  RN PREOP 6/20/2018    TONSILLECTOMY      TURBINATE RESECTION Bilateral 6/26/2018    Procedure: TURBINATE CAUTERY RESECTION WITH DEBRIDER (CRISTINA. MAXILLARY & CRISTINA. ETHMOID);   Surgeon: Sina Cortez MD;  Location: [facility];  Service: ENT;  Laterality: Bilateral;    UVULOPALATOPHARYNGOPLASTY      for REJI       Family History   Problem Relation Age of Onset    Diabetes Mother     Diabetes Father     Heart disease Father     Mental illness Brother         suicide    Cancer Neg Hx         prostate or colon       Social History     Socioeconomic History    Marital status:    Occupational History     Employer: [employer]   Tobacco Use    Smoking status: Every Day     Packs/day: 0.75     Types: Cigarettes     Last attempt to quit: 4/5/2019     Years since quitting: 3.5    Smokeless tobacco: Never    Tobacco comments:      x 34 yr.  Works at [employer].  Three kids.  Walks a lot at work.     Substance and Sexual Activity    Alcohol use: Not Currently     Comment: only on occasion    Drug use: Never    Sexual activity: Yes     Partners: Female     Social Determinants of Health     Financial Resource Strain: Low Risk     Difficulty of Paying Living Expenses: Not hard at all   Food Insecurity: No Food Insecurity    Worried About Running Out of Food in the Last Year: Never true    Ran Out of Food in the Last Year: Never true   Transportation Needs: No Transportation Needs    Lack of Transportation (Medical): No    Lack of Transportation (Non-Medical): No   Physical Activity: Inactive    Days of Exercise per Week: 0 days    Minutes of Exercise per Session: 0 min   Stress: No Stress Concern Present    Feeling of Stress : Not at all   Social Connections: Moderately Isolated    Frequency of Communication with Friends and Family: More than three times a week    Frequency of Social Gatherings with Friends and Family: More than three times a week    Attends Nondenominational Services: Never    Active Member of Clubs or Organizations: No    Attends Club or Organization Meetings: Never    Marital Status:    Housing Stability: Low Risk     Unable to Pay for Housing in the Last Year: No     Number of Places Lived in the Last Year: 2    Unstable Housing in the Last Year: No       No current facility-administered medications on file prior to encounter.     Current Outpatient Medications on File Prior to Encounter   Medication Sig Dispense Refill    aspirin (ECOTRIN) 81 MG EC tablet Take 81 mg by mouth once daily.      atorvastatin (LIPITOR) 40 MG tablet Take 1 tablet (40 mg total) by mouth once daily. (Patient taking differently: Take 40 mg by mouth every evening.) 90 tablet 2    clotrimazole-betamethasone 1-0.05% (LOTRISONE) cream APPLY 1 TO 2 TIMES A DAY AS NEEDED 45 g 2    insulin degludec (TRESIBA FLEXTOUCH U-100) 100 unit/mL (3 mL) insulin pen Inject 35 Units into the skin once daily. (Patient taking differently: Inject 35 Units into the skin every evening.) 10 pen 1    lisinopriL (PRINIVIL,ZESTRIL) 5 MG tablet Take 1 tablet (5 mg total) by mouth once daily. (Patient taking differently: Take 5 mg by mouth every morning.) 90 tablet 3    metFORMIN (GLUCOPHAGE) 1000 MG tablet Take 1 tablet (1,000 mg total) by mouth 2 (two) times daily with meals. 180 tablet 3    metoprolol tartrate (LOPRESSOR) 25 MG tablet Take 0.5 tablets (12.5 mg total) by mouth 2 (two) times daily. 90 tablet 3    nitroGLYCERIN (NITROSTAT) 0.4 MG SL tablet PLACE ONE TABLET UNDER THE TONGUE EVERY 5 MINUTES AS NEEDED 25 tablet 2    ondansetron (ZOFRAN-ODT) 4 MG TbDL Take 1 tablet (4 mg total) by mouth every 4 (four) hours as needed. 20 tablet 1    pantoprazole (PROTONIX) 40 MG tablet Take 1 tablet (40 mg total) by mouth daily as needed. 90 tablet 2    sildenafiL (VIAGRA) 100 MG tablet Take 1 tablet (100 mg total) by mouth daily as needed for Erectile Dysfunction. 30 tablet 5    [DISCONTINUED] ciprofloxacin HCl (CIPRO) 500 MG tablet Take 1 tablet (500 mg total) by mouth 2 (two) times daily. for 5 days 10 tablet 0    [DISCONTINUED] diclofenac sodium (VOLTAREN) 1 % Gel APPLY 2 G TOPICALLY 2 (TWO) TIMES DAILY. (Patient taking  "differently: Apply 2 g topically 2 (two) times daily. PRN) 100 g 1    [DISCONTINUED] empagliflozin (JARDIANCE) 25 mg tablet Take 1 tablet (25 mg total) by mouth once daily. (Patient taking differently: Take 25 mg by mouth once daily. QAM) 90 tablet 3    [DISCONTINUED] loperamide (IMODIUM) 2 mg capsule Take 1 capsule (2 mg total) by mouth 4 (four) times daily as needed for Diarrhea. 12 capsule 1    [DISCONTINUED] semaglutide (OZEMPIC) 2 mg/dose (8 mg/3 mL) PnIj Inject 2 mg into the skin every 7 days. (Patient taking differently: Inject 2 mg into the skin every 7 days. Thursday) 9 mL 3    blood sugar diagnostic Strp 1 strip by Misc.(Non-Drug; Combo Route) route 3 (three) times daily. 200 strip 5    blood-glucose meter kit Use as instructed 1 each 0    lancets Misc 1 application by Misc.(Non-Drug; Combo Route) route 3 (three) times daily. 100 each 5    pen needle, diabetic (BD ULTRA-FINE VIRGILIO PEN NEEDLE) 32 gauge x 5/32" Ndle 1 application by Misc.(Non-Drug; Combo Route) route 3 (three) times daily. 200 each 3    [DISCONTINUED] buPROPion (WELLBUTRIN XL) 150 MG TB24 tablet TAKE 1 TABLET BY MOUTH TWICE A DAY 30 tablet 3    [DISCONTINUED] nateglinide (STARLIX) 120 MG tablet TAKE 1 TABLET (120 MG TOTAL) BY MOUTH 3 (THREE) TIMES DAILY BEFORE MEALS. 270 tablet 1       Review of patient's allergies indicates:  No Known Allergies    Review of Systems   Constitutional:  Negative for chills and fever.   Respiratory:  Negative for cough.    Cardiovascular:  Negative for chest pain.   Gastrointestinal:  Positive for abdominal pain, diarrhea, nausea and vomiting. Negative for blood in stool, constipation, heartburn and melena.   Musculoskeletal:  Negative for joint pain.   Neurological:  Positive for weakness.      Objective:     Vitals:    10/17/22 0734   BP: 114/63   Pulse: 87   Resp: 18   Temp: 97.7 °F (36.5 °C)     Physical Exam  Constitutional:       Appearance: Normal appearance.   Eyes:      Extraocular Movements: " Extraocular movements intact.   Cardiovascular:      Rate and Rhythm: Normal rate and regular rhythm.      Pulses: Normal pulses.      Heart sounds: Normal heart sounds. No murmur heard.  Abdominal:      General: There is distension (mild).      Palpations: Abdomen is soft.      Tenderness: There is no abdominal tenderness.   Musculoskeletal:      Cervical back: Normal range of motion and neck supple.   Neurological:      Mental Status: He is alert.        Significant Labs:  Recent Labs   Lab 10/16/22  0431 10/16/22  1627 10/17/22  0337   HGB 15.8 16.8 15.6         Lab Results   Component Value Date    WBC 9.73 10/17/2022    HGB 15.6 10/17/2022    HCT 44.0 10/17/2022    MCV 84 10/17/2022     10/17/2022       Lab Results   Component Value Date     10/17/2022    K 4.0 10/17/2022     10/17/2022    CO2 23 10/17/2022    BUN 10 10/17/2022    CREATININE 1.2 10/17/2022    CALCIUM 8.4 (L) 10/17/2022    ANIONGAP 14 10/17/2022    ESTGFRAFRICA >60.0 04/20/2022    EGFRNONAA >60.0 04/20/2022       Lab Results   Component Value Date    ALT 16 10/17/2022    AST 19 10/17/2022    ALKPHOS 119 10/17/2022    BILITOT 0.6 10/17/2022       Lab Results   Component Value Date    INR 1.0 09/02/2020    INR 1.0 06/11/2018       Significant Imaging:  Reviewed pertinent radiology findings.     Assessment/Plan:     Stanley Faustin is a 62 y.o. male with history of colon polyps, CAD, DM2, HLD, HTN, MI with PCI and stenting, cluster headaches that was admitted with diarrhea with h/o high grade SBO txed conservatively without cause back in 3/2022. He was suppose to get repeat CT 3 mos later but don't see this done but adhesions unlikely given no prior surgeries. He had imaging showing diffuse dilation of SB in 3/2022 and now CT c/w possible findings in the mid SB and mesenteric US SMA 50% stenosis.  Scope to be determined after stool studies and SB imaging.    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 Counseled on infection control and importance of hand washing during home treatment of C-diff.      Thank you for involving us in the care of Stanley Faustin. Please call with any additional questions, concerns or changes in the patient's clinical status.    Agustina Zavala NP  [facility]

## 2022-10-17 NOTE — NURSING
Report received from off going Nurse Seema . Pt awake and alert watching TV . Denies any discomfort at this time . Pt knows he is C- Diff positive . Pt now on PO ABX. Bed down SR up x 2 HOB. Call bell in reach .

## 2022-10-18 ENCOUNTER — TELEPHONE (OUTPATIENT)
Dept: HEPATOLOGY | Facility: HOSPITAL | Age: 62
End: 2022-10-18
Payer: COMMERCIAL

## 2022-10-18 LAB
E COLI SXT1 STL QL IA: NEGATIVE
E COLI SXT2 STL QL IA: NEGATIVE

## 2022-10-18 NOTE — TELEPHONE ENCOUNTER
Called Mr Stanley Faustin to check on status of rash that developed just prior to discharge. Rash is almost completely resolved. Rash more likely irritant/contact dermatitis than antibiotic associated.     Natalie Goodwin MD  10/18/2022  4:34 PM

## 2022-10-19 LAB — BACTERIA STL CULT: NORMAL

## 2022-10-20 LAB
BACTERIA BLD CULT: NORMAL
BACTERIA BLD CULT: NORMAL
O+P STL MICRO: NORMAL

## 2022-10-24 ENCOUNTER — OFFICE VISIT (OUTPATIENT)
Dept: FAMILY MEDICINE | Facility: CLINIC | Age: 62
End: 2022-10-24
Payer: COMMERCIAL

## 2022-10-24 VITALS
HEIGHT: 69 IN | HEART RATE: 91 BPM | SYSTOLIC BLOOD PRESSURE: 105 MMHG | TEMPERATURE: 98 F | WEIGHT: 175.94 LBS | BODY MASS INDEX: 26.06 KG/M2 | OXYGEN SATURATION: 99 % | DIASTOLIC BLOOD PRESSURE: 73 MMHG

## 2022-10-24 DIAGNOSIS — I25.10 CORONARY ARTERY DISEASE DUE TO LIPID RICH PLAQUE: Chronic | ICD-10-CM

## 2022-10-24 DIAGNOSIS — I10 ESSENTIAL HYPERTENSION, BENIGN: Chronic | ICD-10-CM

## 2022-10-24 DIAGNOSIS — E11.65 TYPE 2 DIABETES MELLITUS WITH HYPERGLYCEMIA, WITH LONG-TERM CURRENT USE OF INSULIN: ICD-10-CM

## 2022-10-24 DIAGNOSIS — E78.00 HYPERCHOLESTEROLEMIA: Chronic | ICD-10-CM

## 2022-10-24 DIAGNOSIS — A04.72 C. DIFFICILE COLITIS: ICD-10-CM

## 2022-10-24 DIAGNOSIS — Z79.4 TYPE 2 DIABETES MELLITUS WITH HYPERGLYCEMIA, WITH LONG-TERM CURRENT USE OF INSULIN: ICD-10-CM

## 2022-10-24 DIAGNOSIS — E87.20 LACTIC ACIDOSIS: ICD-10-CM

## 2022-10-24 DIAGNOSIS — R11.0 NAUSEA: ICD-10-CM

## 2022-10-24 DIAGNOSIS — Z09 HOSPITAL DISCHARGE FOLLOW-UP: Primary | ICD-10-CM

## 2022-10-24 DIAGNOSIS — I25.83 CORONARY ARTERY DISEASE DUE TO LIPID RICH PLAQUE: Chronic | ICD-10-CM

## 2022-10-24 DIAGNOSIS — N17.9 AKI (ACUTE KIDNEY INJURY): ICD-10-CM

## 2022-10-24 PROCEDURE — 3074F SYST BP LT 130 MM HG: CPT | Mod: CPTII,S$GLB,, | Performed by: FAMILY MEDICINE

## 2022-10-24 PROCEDURE — 3078F DIAST BP <80 MM HG: CPT | Mod: CPTII,S$GLB,, | Performed by: FAMILY MEDICINE

## 2022-10-24 PROCEDURE — 4010F ACE/ARB THERAPY RXD/TAKEN: CPT | Mod: CPTII,S$GLB,, | Performed by: FAMILY MEDICINE

## 2022-10-24 PROCEDURE — 3074F PR MOST RECENT SYSTOLIC BLOOD PRESSURE < 130 MM HG: ICD-10-PCS | Mod: CPTII,S$GLB,, | Performed by: FAMILY MEDICINE

## 2022-10-24 PROCEDURE — 1160F PR REVIEW ALL MEDS BY PRESCRIBER/CLIN PHARMACIST DOCUMENTED: ICD-10-PCS | Mod: CPTII,S$GLB,, | Performed by: FAMILY MEDICINE

## 2022-10-24 PROCEDURE — 1160F RVW MEDS BY RX/DR IN RCRD: CPT | Mod: CPTII,S$GLB,, | Performed by: FAMILY MEDICINE

## 2022-10-24 PROCEDURE — 99214 PR OFFICE/OUTPT VISIT, EST, LEVL IV, 30-39 MIN: ICD-10-PCS | Mod: S$GLB,,, | Performed by: FAMILY MEDICINE

## 2022-10-24 PROCEDURE — 3078F PR MOST RECENT DIASTOLIC BLOOD PRESSURE < 80 MM HG: ICD-10-PCS | Mod: CPTII,S$GLB,, | Performed by: FAMILY MEDICINE

## 2022-10-24 PROCEDURE — 99999 PR PBB SHADOW E&M-EST. PATIENT-LVL V: CPT | Mod: PBBFAC,,, | Performed by: FAMILY MEDICINE

## 2022-10-24 PROCEDURE — 99999 PR PBB SHADOW E&M-EST. PATIENT-LVL V: ICD-10-PCS | Mod: PBBFAC,,, | Performed by: FAMILY MEDICINE

## 2022-10-24 PROCEDURE — 3051F PR MOST RECENT HEMOGLOBIN A1C LEVEL 7.0 - < 8.0%: ICD-10-PCS | Mod: CPTII,S$GLB,, | Performed by: FAMILY MEDICINE

## 2022-10-24 PROCEDURE — 4010F PR ACE/ARB THEARPY RXD/TAKEN: ICD-10-PCS | Mod: CPTII,S$GLB,, | Performed by: FAMILY MEDICINE

## 2022-10-24 PROCEDURE — 99214 OFFICE O/P EST MOD 30 MIN: CPT | Mod: S$GLB,,, | Performed by: FAMILY MEDICINE

## 2022-10-24 PROCEDURE — 1159F PR MEDICATION LIST DOCUMENTED IN MEDICAL RECORD: ICD-10-PCS | Mod: CPTII,S$GLB,, | Performed by: FAMILY MEDICINE

## 2022-10-24 PROCEDURE — 1159F MED LIST DOCD IN RCRD: CPT | Mod: CPTII,S$GLB,, | Performed by: FAMILY MEDICINE

## 2022-10-24 PROCEDURE — 3051F HG A1C>EQUAL 7.0%<8.0%: CPT | Mod: CPTII,S$GLB,, | Performed by: FAMILY MEDICINE

## 2022-10-24 RX ORDER — ONDANSETRON 4 MG/1
4 TABLET, ORALLY DISINTEGRATING ORAL EVERY 6 HOURS PRN
Qty: 60 TABLET | Refills: 1 | Status: SHIPPED | OUTPATIENT
Start: 2022-10-24

## 2022-10-24 RX ORDER — LOPERAMIDE HYDROCHLORIDE 2 MG/1
4 CAPSULE ORAL 2 TIMES DAILY
Qty: 60 CAPSULE | Refills: 1 | Status: SHIPPED | OUTPATIENT
Start: 2022-10-24

## 2022-10-24 RX ORDER — LOPERAMIDE HYDROCHLORIDE 2 MG/1
4 CAPSULE ORAL 2 TIMES DAILY
COMMUNITY
Start: 2022-10-18 | End: 2022-10-24 | Stop reason: SDUPTHER

## 2022-10-24 RX ORDER — ONDANSETRON 4 MG/1
4 TABLET, ORALLY DISINTEGRATING ORAL EVERY 6 HOURS PRN
Qty: 60 TABLET | Refills: 1 | Status: SHIPPED | OUTPATIENT
Start: 2022-10-24 | End: 2022-10-24 | Stop reason: SDUPTHER

## 2022-10-24 NOTE — PROGRESS NOTES
"  Physical Exam  /73   Pulse 91   Temp 97.8 °F (36.6 °C) (Oral)   Ht 5' 9" (1.753 m)   Wt 79.8 kg (175 lb 14.8 oz)   SpO2 99%   BMI 25.98 kg/m²      Office Visit    Patient Name: Stanley Faustin    : 1960  MRN: [id number]      Assessment/Plan:  Stanley Faustin is a 62 y.o. male who presents today for :    Hospital discharge follow-up  C. difficile colitis  -     loperamide (IMODIUM) 2 mg capsule; Take 2 capsules (4 mg total) by mouth 2 (two) times daily.  Dispense: 60 capsule; Refill: 1  Lactic acidosis  - resolved  FRENCH (acute kidney injury)  - resolved  Nausea  -     ondansetron (ZOFRAN-ODT) 4 MG TbDL; Take 1 tablet (4 mg total) by mouth every 6 (six) hours as needed.  Dispense: 60 tablet; Refill: 1  -afebrile /VSS, exam unremarkable  - doing better post-discharge, continue with adequate fluid intake  -continue Vancomycin until the course is finished  all clinic back with any concerns    Type 2 diabetes mellitus with hyperglycemia, with long-term current use of insulin  Essential hypertension, benign  Coronary artery disease due to lipid rich plaque  Hypercholesterolemia  -continue current medication regimen  -DASH diet, regular cardiovascular exercises  -weight loss      Follow up for worsening Sx. Urgent care/ED precautions provided.      This note was created by combination of typed  and MModal dictation.  Transcription errors may be present.  If there are any questions, please contact me.          ----------------------------------------------------------------------------------------------------------------------      HPI:  Patient Care Team:  Masood Khan MD as PCP - General (Family Medicine)  Eliel Contreras OD (Optometry)  Christophe Reich MA as Care Coordinator    Stanley is a 62 y.o. male with      Patient Active Problem List   Diagnosis    CAD (coronary artery disease)    Hypercholesterolemia    Episodic cluster headache, not intractable    Hx of CABG    Tobacco " smoker, less than 10 cigarettes per day    Type 2 diabetes mellitus with hyperglycemia, with long-term current use of insulin    Essential hypertension, benign    Nasal septal deviation    Hypotension due to hypovolemia    FRENCH (acute kidney injury)    Hypokalemia    Lactic acidosis    C. difficile colitis     This patient is new to me       Patient with above PMHx and is here today for f/u of recent hospital stay for C diff colitis and FRENCH over a week ago. His conditions improved with resolution of FRENCH. He was discharge on Oral Vancomycin, which he has been doing well on. His diarrhea has improved, has had only 1 episode today so far, nonbloody. His appetite is still decreased and he tries to consume as much liquid as he can, still has mild nausea. No CP/SOB/WARD.       Additional ROS    CONST: no fever, + activity change  EYES: no vision change.   ENT: no sore throat. No dysphagia.   CV: no CP with exertion  RESP: no SOB  GI: no constipation, +N/V, diarrhea  : no urinary concerns  MSK: no new myalgias or arthralgias.   SKIN: no new rashes  NEURO: no focal deficits.   PSYCH: no new issues.       [facility] Medicine  Discharge Summary        Patient Name: Stanley Faustin  MRN: [id number]  Patient Class: IP- Inpatient  Admission Date: 10/15/2022  Hospital Length of Stay: 2 days  Discharge Date and Time:  10/17/2022 9:31 AM  Attending Physician: Natalie Goodwin MD   Discharging Provider: Natalie Goodwin MD  Primary Care Provider: Masood Khan MD        HPI:   Mr. Rose is a 63yo man with a past medical history of CAD, DM2, HLD, HTN, MI with PCI and stenting, and cluster headaches who presented to the hospital for evaluation of diarrhea.  The patient began to have diarrhea last Sunday, 2 episodes.  He states by Wednesday he was having multiple episodes, that he associated with anytime he ate or drank, which prompted him to present to the ED on Thursday for evaluation.       A CT abdomen  "pelvis was obtained at that time and showed a nonspecific diarrheal illness or enteritis.  He was discharged home on Cipro.  The patient continued to have episodes of diarrhea when he ate or drank and had x1 episode of vomiting today.  He returned to the ED for evaluation.     Currently the patient is alert and appears to be in no distress.  He denies CP, abdominal pain, SOB, nausea, or dizziness at this time.  He endorses feeling lightheaded and intermittently SOB when he has the diarrhea episodes and states he has diarrhea shortly after eating or drinking.  His abdomen is non distended, bowel sounds are decreased but he has no abdominal tenderness.     Today in the ED his vital signs were BP 89/56 -> 132/71   Pulse 123 -> 91   Temp 97.8 °F (36.6 °C) (Oral)   Resp 19   Ht 5' 9" (1.753 m)   Wt 81.6 kg (180 lb)   SpO2 100% RA  BMI 26.58 kg/m².  Labs showed WBC 12, Hg 18.8, Na 129, K 3.1, Cr 1.5 yesterday -> 2.8 today (baseline 0.9), gluc 249, normal LFTs.  LA 1.8 yesterday -> 4 today.  EKG today showed sinus tachy, rate 127 with PVC's.     In the ED he was treated with LR 1L x 2, Morphine 6mg IV, and Zofran 4mg IV.     CT abdomen and pelvis 10/14/22: Minimal fluid distension of small bowel in the lower abdomen and liquid stool throughout the colon.  Findings may represent a nonspecific diarrheal illness or enteritis in the appropriate clinical setting.  Aortoiliac atherosclerosis with moderate narrowing of the right superficial femoral artery.  Hepatic steatosis.  Postoperative changes of prior median sternotomy.                         * No surgery found *       Hospital Course:   Mr Stanley Faustin was admitted with diarrhea and FRENCH. Cdiff positive. Started PO vanc. Symptoms improving, diarrhea decreased, no abdominal pain, tolerating diet. FRENCH resolved. Stable for discharge to home with PO vanc x10 days. Follow up with PCP.      After discharge and prior to leaving the hospital, patient reported a rash. " He has a erythematous blanching plaque rash in groin, upper thighs both anterior and posterior. No pruritis. No mucus membrane/palm/sole involvement. PO vanc is not associated with rash. Given the distribution of the rash, I highly suspect it is associated with contact allergy- perhaps from cleaning after episode of diarrhea. This rash is not spreading. OK for discharge to home. Told him to follow up with PCP if rash is worsening. OK to use benadryl if needed. Plan of care discussed with patient and his daughter at bedside.            Patient Active Problem List   Diagnosis    CAD (coronary artery disease)    Hypercholesterolemia    Episodic cluster headache, not intractable    Hx of CABG    Tobacco smoker, less than 10 cigarettes per day    Type 2 diabetes mellitus with hyperglycemia, with long-term current use of insulin    Essential hypertension, benign    Nasal septal deviation    Hypotension due to hypovolemia    FRENCH (acute kidney injury)    Hypokalemia    Lactic acidosis    C. difficile colitis       Current Medications  Medications reviewed and updated.       Current Outpatient Medications:     insulin degludec (TRESIBA FLEXTOUCH U-100) 100 unit/mL (3 mL) insulin pen, Inject 35 Units into the skin once daily., Disp: 10 pen, Rfl: 1    semaglutide (OZEMPIC) 2 mg/dose (8 mg/3 mL) PnIj, Inject 2 mg into the skin every 7 days. Thursday, Disp: , Rfl:     vancomycin 125 mg/5 mL Soln, Take 20 mLs (500 mg total) by mouth every 6 (six) hours for 10 days. Discard any remainder, Disp: 900 mL, Rfl: 0    aspirin (ECOTRIN) 81 MG EC tablet, Take 81 mg by mouth once daily., Disp: , Rfl:     atorvastatin (LIPITOR) 40 MG tablet, Take 1 tablet (40 mg total) by mouth once daily. (Patient not taking: Reported on 10/24/2022), Disp: 90 tablet, Rfl: 2    blood sugar diagnostic Strp, 1 strip by Misc.(Non-Drug; Combo Route) route 3 (three) times daily. (Patient not taking: Reported on 10/24/2022), Disp: 200 strip, Rfl: 5     "blood-glucose meter kit, Use as instructed (Patient not taking: Reported on 10/24/2022), Disp: 1 each, Rfl: 0    clotrimazole-betamethasone 1-0.05% (LOTRISONE) cream, APPLY 1 TO 2 TIMES A DAY AS NEEDED (Patient not taking: Reported on 10/24/2022), Disp: 45 g, Rfl: 2    diclofenac sodium (VOLTAREN) 1 % Gel, Apply 2 g topically 2 (two) times daily. PRN (Patient not taking: Reported on 10/24/2022), Disp: , Rfl:     empagliflozin (JARDIANCE) 25 mg tablet, Take 1 tablet (25 mg total) by mouth once daily. QAM (Patient not taking: Reported on 10/24/2022), Disp: , Rfl:     lancets Misc, 1 application by Misc.(Non-Drug; Combo Route) route 3 (three) times daily. (Patient not taking: Reported on 10/24/2022), Disp: 100 each, Rfl: 5    lisinopriL (PRINIVIL,ZESTRIL) 5 MG tablet, Take 1 tablet (5 mg total) by mouth once daily. (Patient not taking: Reported on 10/24/2022), Disp: 90 tablet, Rfl: 3    loperamide (IMODIUM) 2 mg capsule, Take 2 capsules (4 mg total) by mouth 2 (two) times daily., Disp: 60 capsule, Rfl: 1    metFORMIN (GLUCOPHAGE) 1000 MG tablet, Take 1 tablet (1,000 mg total) by mouth 2 (two) times daily with meals. (Patient not taking: Reported on 10/24/2022), Disp: 180 tablet, Rfl: 3    metoprolol tartrate (LOPRESSOR) 25 MG tablet, Take 0.5 tablets (12.5 mg total) by mouth 2 (two) times daily. (Patient not taking: Reported on 10/24/2022), Disp: 90 tablet, Rfl: 3    nitroGLYCERIN (NITROSTAT) 0.4 MG SL tablet, PLACE ONE TABLET UNDER THE TONGUE EVERY 5 MINUTES AS NEEDED (Patient not taking: Reported on 10/24/2022), Disp: 25 tablet, Rfl: 2    ondansetron (ZOFRAN-ODT) 4 MG TbDL, Take 1 tablet (4 mg total) by mouth every 6 (six) hours as needed., Disp: 60 tablet, Rfl: 1    pantoprazole (PROTONIX) 40 MG tablet, Take 1 tablet (40 mg total) by mouth daily as needed. (Patient not taking: Reported on 10/24/2022), Disp: 90 tablet, Rfl: 2    pen needle, diabetic (BD ULTRA-FINE VIRGILIO PEN NEEDLE) 32 gauge x 5/32" Ndle, 1 application " by Misc.(Non-Drug; Combo Route) route 3 (three) times daily. (Patient not taking: Reported on 10/24/2022), Disp: 200 each, Rfl: 3    sildenafiL (VIAGRA) 100 MG tablet, Take 1 tablet (100 mg total) by mouth daily as needed for Erectile Dysfunction. (Patient not taking: Reported on 10/24/2022), Disp: 30 tablet, Rfl: 5    Past Surgical History:   Procedure Laterality Date    COLONOSCOPY N/A 4/1/2021    Procedure: COLONOSCOPY;  Surgeon: Bernard Leach MD;  Location: [facility] ENDO;  Service: Endoscopy;  Laterality: N/A;  covid test 3/29 lapalco, current smoker, prep instr emailed, 1 visitor policy explained -ml  3/30 pt understands earlier arrival time and earlier prep time -ml    CORONARY ANGIOPLASTY WITH STENT PLACEMENT      ENDOSCOPIC NASAL SEPTOPLASTY N/A 2/11/2020    Procedure: SEPTOPLASTY, NOSE, ENDOSCOPIC;  Surgeon: Clifton Angel MD;  Location: [facility] OR;  Service: ENT;  Laterality: N/A;  DISC LOADED BY VICENTE ON 2-5-2020  RN PRE OP 2-7-2020 [place]  NEED H/P    FUNCTIONAL ENDOSCOPIC SINUS SURGERY (FESS) USING COMPUTER-ASSISTED NAVIGATION Bilateral 6/26/2018    Procedure: SINUS SURGERY FUNCTIONAL ENDOSCOPIC WITH NAVIGATION;  Surgeon: Sina Cortez MD;  Location: [facility] OR;  Service: ENT;  Laterality: Bilateral;    FUNCTIONAL ENDOSCOPIC SINUS SURGERY (FESS) USING COMPUTER-ASSISTED NAVIGATION Bilateral 2/11/2020    Procedure: FESS, USING COMPUTER-ASSISTED NAVIGATION;  Surgeon: Clifton Angel MD;  Location: [facility] OR;  Service: ENT;  Laterality: Bilateral;    left shoulder      SC CABG, ARTERY-VEIN, FOUR  04/12/2019    Coronary Artery Bypass, 4    RECONSTRUCTION OF NOSE Bilateral 6/26/2018    Procedure: RECONSTRUCTION-NASAL;  Surgeon: Sina Cortez MD;  Location: [facility] OR;  Service: ENT;  Laterality: Bilateral;  [employer]  RN PREOP 6/20/2018    TONSILLECTOMY      TURBINATE RESECTION Bilateral 6/26/2018    Procedure: TURBINATE CAUTERY RESECTION WITH DEBRIDER (CRISTINA. MAXILLARY & CRISTINA. ETHMOID);  Surgeon: Sina Cortez MD;   Location: [facility] OR;  Service: ENT;  Laterality: Bilateral;    UVULOPALATOPHARYNGOPLASTY      for REJI       Family History   Problem Relation Age of Onset    Diabetes Mother     Diabetes Father     Heart disease Father     Mental illness Brother         suicide    Cancer Neg Hx         prostate or colon       Social History     Socioeconomic History    Marital status:    Occupational History     Employer: [employer]   Tobacco Use    Smoking status: Every Day     Packs/day: 0.75     Types: Cigarettes     Last attempt to quit: 4/5/2019     Years since quitting: 3.5    Smokeless tobacco: Never    Tobacco comments:      x 34 yr.  Works at [employer].  Three kids.  Walks a lot at work.     Substance and Sexual Activity    Alcohol use: Not Currently     Comment: only on occasion    Drug use: Never    Sexual activity: Yes     Partners: Female     Social Determinants of Health     Financial Resource Strain: Low Risk     Difficulty of Paying Living Expenses: Not hard at all   Food Insecurity: No Food Insecurity    Worried About Running Out of Food in the Last Year: Never true    Ran Out of Food in the Last Year: Never true   Transportation Needs: No Transportation Needs    Lack of Transportation (Medical): No    Lack of Transportation (Non-Medical): No   Physical Activity: Inactive    Days of Exercise per Week: 0 days    Minutes of Exercise per Session: 0 min   Stress: No Stress Concern Present    Feeling of Stress : Not at all   Social Connections: Moderately Isolated    Frequency of Communication with Friends and Family: More than three times a week    Frequency of Social Gatherings with Friends and Family: More than three times a week    Attends Shinto Services: Never    Active Member of Clubs or Organizations: No    Attends Club or Organization Meetings: Never    Marital Status:    Housing Stability: Low Risk     Unable to Pay for Housing in the Last Year: No    Number of Places Lived in  "the Last Year: 2    Unstable Housing in the Last Year: No           Allergies   Review of patient's allergies indicates:  No Known Allergies          Review of Systems  See HPI        [unfilled]  /73   Pulse 91   Temp 97.8 °F (36.6 °C) (Oral)   Ht 5' 9" (1.753 m)   Wt 79.8 kg (175 lb 14.8 oz)   SpO2 99%   BMI 25.98 kg/m²     GEN: NAD, well developed, pleasant, well nourished  HEENT: NCAT, PERRLA, EOMI, sclera clear, anicteric  NECK: normal, supple with midline trachea, no LAD, no thyromegaly  LUNGS: CTAB, no w/r/r, normal effort, no increased work of breathing,  HEART: RRR, normal S1 and S2, no m/r/g, no palpitations, no edema, normal pulses  ABD: s/nt/nd, NABS, no organomegaly, no masses  SKIN: warm and dry with normal turgor, no rashes,  PSYCH: AOx3, appropriate mood and affect.   MSK: extremities warm/well perfused, normal ROM in all 4 extremities, no c/c/e.  NEURO: normal without focal findings, CN II-XII are intact.  Sensation grossly normal, gait and station normal.                   "

## 2022-11-03 ENCOUNTER — PATIENT MESSAGE (OUTPATIENT)
Dept: FAMILY MEDICINE | Facility: CLINIC | Age: 62
End: 2022-11-03
Payer: COMMERCIAL

## 2022-11-04 RX ORDER — CLOTRIMAZOLE AND BETAMETHASONE DIPROPIONATE 10; .64 MG/G; MG/G
CREAM TOPICAL 2 TIMES DAILY
Qty: 45 G | Refills: 2 | Status: SHIPPED | OUTPATIENT
Start: 2022-11-04

## 2022-11-04 RX ORDER — DICLOFENAC SODIUM 10 MG/G
2 GEL TOPICAL 2 TIMES DAILY PRN
Qty: 100 G | Refills: 1 | Status: SHIPPED | OUTPATIENT
Start: 2022-11-04

## 2022-12-28 DIAGNOSIS — E11.9 TYPE 2 DIABETES MELLITUS WITHOUT COMPLICATION: ICD-10-CM

## 2023-01-09 ENCOUNTER — PATIENT MESSAGE (OUTPATIENT)
Dept: ADMINISTRATIVE | Facility: HOSPITAL | Age: 63
End: 2023-01-09
Payer: COMMERCIAL

## 2023-01-16 PROBLEM — N17.9 AKI (ACUTE KIDNEY INJURY): Status: RESOLVED | Noted: 2022-10-16 | Resolved: 2023-01-16

## 2023-02-22 DIAGNOSIS — E11.9 TYPE 2 DIABETES MELLITUS WITHOUT COMPLICATION: ICD-10-CM

## 2023-03-13 ENCOUNTER — PATIENT OUTREACH (OUTPATIENT)
Dept: ADMINISTRATIVE | Facility: HOSPITAL | Age: 63
End: 2023-03-13
Payer: COMMERCIAL

## 2023-04-05 ENCOUNTER — PATIENT OUTREACH (OUTPATIENT)
Dept: ADMINISTRATIVE | Facility: HOSPITAL | Age: 63
End: 2023-04-05
Payer: COMMERCIAL

## 2023-04-05 DIAGNOSIS — E11.9 TYPE 2 DIABETES MELLITUS WITHOUT COMPLICATION, WITHOUT LONG-TERM CURRENT USE OF INSULIN: Primary | ICD-10-CM

## 2023-04-12 ENCOUNTER — PATIENT MESSAGE (OUTPATIENT)
Dept: ADMINISTRATIVE | Facility: HOSPITAL | Age: 63
End: 2023-04-12
Payer: COMMERCIAL

## 2023-04-29 DIAGNOSIS — E11.22 TYPE 2 DIABETES MELLITUS WITH DIABETIC CHRONIC KIDNEY DISEASE: ICD-10-CM

## 2023-04-29 NOTE — TELEPHONE ENCOUNTER
Care Due:                  Date            Visit Type   Department     Provider  --------------------------------------------------------------------------------                                [facility] FAMILY                              PRIMARY      MED/ INTERNAL  Last Visit: 10-      CARE ([facility])   MED/ PEDS      Suleman Khan  Next Visit: None Scheduled  None         None Found                                                            Last  Test          Frequency    Reason                     Performed    Due Date  --------------------------------------------------------------------------------    HBA1C.......  6 months...  TRESIBA, metFORMIN.......  08- 02-    Lipid Panel.  12 months..  atorvastatin.............  12- 11-    [facility] Embedded Care Due Messages. Reference number: [id number].   4/29/2023 7:15:19 AM CDT

## 2023-05-01 RX ORDER — METFORMIN HYDROCHLORIDE 1000 MG/1
TABLET ORAL
Qty: 180 TABLET | Refills: 1 | Status: SHIPPED | OUTPATIENT
Start: 2023-05-01

## 2023-06-24 NOTE — TELEPHONE ENCOUNTER
No care due was identified.  [facility] Embedded Care Due Messages. Reference number: [id number].   6/24/2023 8:43:15 AM CDT

## 2023-06-26 RX ORDER — LISINOPRIL 5 MG/1
TABLET ORAL
Qty: 90 TABLET | Refills: 0 | Status: SHIPPED | OUTPATIENT
Start: 2023-06-26

## 2023-06-26 NOTE — TELEPHONE ENCOUNTER
Refill Decision Note   Stanley Ramin  is requesting a refill authorization.  Brief Assessment and Rationale for Refill:  Approve     Medication Therapy Plan:       Medication Reconciliation Completed: No   Comments:     No Care Gaps recommended.     Note composed:4:20 PM 06/26/2023

## 2023-09-18 DIAGNOSIS — Z79.4 TYPE 2 DIABETES MELLITUS WITH MILD NONPROLIFERATIVE RETINOPATHY, WITH LONG-TERM CURRENT USE OF INSULIN, MACULAR EDEMA PRESENCE UNSPECIFIED, UNSPECIFIED LATERALITY: Chronic | ICD-10-CM

## 2023-09-18 DIAGNOSIS — E11.3299 TYPE 2 DIABETES MELLITUS WITH MILD NONPROLIFERATIVE RETINOPATHY, WITH LONG-TERM CURRENT USE OF INSULIN, MACULAR EDEMA PRESENCE UNSPECIFIED, UNSPECIFIED LATERALITY: Chronic | ICD-10-CM

## 2023-09-18 NOTE — TELEPHONE ENCOUNTER
Care Due:                  Date            Visit Type   Department     Provider  --------------------------------------------------------------------------------                                [facility] FAMILY                              PRIMARY      MED/ INTERNAL  Last Visit: 10-      CARE ([facility])   MED/ PEDS      Suleman Khan  Next Visit: None Scheduled  None         None Found                                                            Last  Test          Frequency    Reason                     Performed    Due Date  --------------------------------------------------------------------------------    Office Visit  12 months..  TRESIBA, atorvastatin,     10-   10-                             lisinopriL, metFORMIN,                             pantoprazole, sildenafiL.    CMP.........  12 months..  TRESIBA, atorvastatin,     10-   10-                             lisinopriL, metFORMIN....    HBA1C.......  6 months...  TRESIBA, metFORMIN.......  08- 02-    Lipid Panel.  12 months..  atorvastatin.............  12- 11-    Health [facility] Embedded Care Due Messages. Reference number: [id number].   9/18/2023 1:32:01 PM CDT

## 2023-09-20 RX ORDER — INSULIN DEGLUDEC 100 U/ML
35 INJECTION, SOLUTION SUBCUTANEOUS DAILY
Qty: 30 EACH | Refills: 0 | Status: SHIPPED | OUTPATIENT
Start: 2023-09-20 | End: 2023-11-15

## 2023-09-20 NOTE — TELEPHONE ENCOUNTER
Refill Routing Note   Medication(s) are not appropriate for processing by [facility] for the following reason(s):      Patient seen in ED/Hospital since LOV with provider  Required labs outdated    [facility] action(s):  Defer Care Due:  Labs due            Appointments  past 12m or future 3m with PCP    Date Provider   Last Visit   8/23/2022 Masood Khan MD   Next Visit   Visit date not found Masood Khan MD   ED visits in past 90 days: 0        Note composed:9:38 AM 09/20/2023

## 2023-09-28 ENCOUNTER — PATIENT MESSAGE (OUTPATIENT)
Dept: ADMINISTRATIVE | Facility: HOSPITAL | Age: 63
End: 2023-09-28
Payer: COMMERCIAL

## 2023-11-06 ENCOUNTER — PATIENT MESSAGE (OUTPATIENT)
Dept: ADMINISTRATIVE | Facility: HOSPITAL | Age: 63
End: 2023-11-06
Payer: COMMERCIAL

## 2023-11-08 ENCOUNTER — PATIENT MESSAGE (OUTPATIENT)
Dept: ADMINISTRATIVE | Facility: HOSPITAL | Age: 63
End: 2023-11-08
Payer: COMMERCIAL

## 2023-11-15 ENCOUNTER — PATIENT MESSAGE (OUTPATIENT)
Dept: FAMILY MEDICINE | Facility: CLINIC | Age: 63
End: 2023-11-15
Payer: COMMERCIAL

## 2023-11-15 DIAGNOSIS — E11.3299 TYPE 2 DIABETES MELLITUS WITH MILD NONPROLIFERATIVE RETINOPATHY, WITH LONG-TERM CURRENT USE OF INSULIN, MACULAR EDEMA PRESENCE UNSPECIFIED, UNSPECIFIED LATERALITY: Chronic | ICD-10-CM

## 2023-11-15 DIAGNOSIS — Z79.4 TYPE 2 DIABETES MELLITUS WITH MILD NONPROLIFERATIVE RETINOPATHY, WITH LONG-TERM CURRENT USE OF INSULIN, MACULAR EDEMA PRESENCE UNSPECIFIED, UNSPECIFIED LATERALITY: Chronic | ICD-10-CM

## 2023-11-15 RX ORDER — INSULIN DEGLUDEC 100 U/ML
35 INJECTION, SOLUTION SUBCUTANEOUS DAILY
Qty: 30 EACH | Refills: 0 | Status: SHIPPED | OUTPATIENT
Start: 2023-11-15

## 2023-11-15 NOTE — TELEPHONE ENCOUNTER
No care due was identified.  [facility] Embedded Care Due Messages. Reference number: [id number].   11/15/2023 3:11:42 PM CST

## 2023-11-20 ENCOUNTER — PATIENT MESSAGE (OUTPATIENT)
Dept: ADMINISTRATIVE | Facility: HOSPITAL | Age: 63
End: 2023-11-20
Payer: COMMERCIAL

## 2023-12-08 ENCOUNTER — PATIENT MESSAGE (OUTPATIENT)
Dept: FAMILY MEDICINE | Facility: CLINIC | Age: 63
End: 2023-12-08
Payer: COMMERCIAL

## 2023-12-08 ENCOUNTER — PATIENT OUTREACH (OUTPATIENT)
Dept: ADMINISTRATIVE | Facility: HOSPITAL | Age: 63
End: 2023-12-08
Payer: COMMERCIAL

## 2023-12-12 DIAGNOSIS — I10 ESSENTIAL HYPERTENSION, BENIGN: Chronic | ICD-10-CM

## 2023-12-12 NOTE — TELEPHONE ENCOUNTER
Refill Routing Note   Medication(s) are not appropriate for processing by [facility] for the following reason(s):        Responsible provider unclear  Required labs outdated  Required vitals outdated  ED/Hospital Visit since last OV with provider    [facility] action(s):  Defer               Appointments  past 12m or future 3m with PCP    Date Provider   Last Visit   8/23/2022 Masood Khan MD   Next Visit   Visit date not found Masood Khan MD   ED visits in past 90 days: 0        Note composed:11:37 AM 12/12/2023

## 2023-12-13 RX ORDER — METOPROLOL TARTRATE 25 MG/1
12.5 TABLET, FILM COATED ORAL 2 TIMES DAILY
Qty: 90 TABLET | Refills: 1 | OUTPATIENT
Start: 2023-12-13

## 2024-01-24 DIAGNOSIS — I10 ESSENTIAL HYPERTENSION, BENIGN: Chronic | ICD-10-CM

## 2024-01-24 RX ORDER — METOPROLOL TARTRATE 25 MG/1
12.5 TABLET, FILM COATED ORAL 2 TIMES DAILY
Qty: 90 TABLET | Refills: 0 | OUTPATIENT
Start: 2024-01-24

## 2024-01-24 NOTE — TELEPHONE ENCOUNTER
Refill Routing Note   Medication(s) are not appropriate for processing by [facility] for the following reason(s):        Responsible provider unclear  Patient not seen by provider within 15 months  ED/Hospital Visit since last OV with provider  Required vitals outdated    [facility] action(s):  Defer               Appointments  past 12m or future 3m with PCP    Date Provider   Last Visit   8/23/2022 Masood Khan MD   Next Visit   Visit date not found Masood Khan MD   ED visits in past 90 days: 0        Note composed:9:46 AM 01/24/2024

## 2024-02-16 DIAGNOSIS — I10 ESSENTIAL HYPERTENSION, BENIGN: Chronic | ICD-10-CM

## 2024-02-16 NOTE — TELEPHONE ENCOUNTER
Refill Routing Note   Medication(s) are not appropriate for processing by [facility] for the following reason(s):        Responsible provider unclear  Patient not seen by provider within 15 months  Required vitals outdated  ED/Hospital Visit since last OV with provider    [facility] action(s):  Defer               Appointments  past 12m or future 3m with PCP    Date Provider   Last Visit   8/23/2022 Masood Khan MD   Next Visit   Visit date not found Masood Khan MD   ED visits in past 90 days: 0        Note composed:3:43 PM 02/16/2024

## 2024-02-19 RX ORDER — METOPROLOL TARTRATE 25 MG/1
12.5 TABLET, FILM COATED ORAL 2 TIMES DAILY
Qty: 90 TABLET | Refills: 1 | OUTPATIENT
Start: 2024-02-19

## 2024-03-05 DIAGNOSIS — I10 ESSENTIAL HYPERTENSION, BENIGN: Chronic | ICD-10-CM

## 2024-03-05 RX ORDER — METOPROLOL TARTRATE 25 MG/1
12.5 TABLET, FILM COATED ORAL 2 TIMES DAILY
Qty: 90 TABLET | Refills: 1 | OUTPATIENT
Start: 2024-03-05

## 2024-03-05 NOTE — TELEPHONE ENCOUNTER
Refill Routing Note   Medication(s) are not appropriate for processing by [facility] for the following reason(s):        Patient not seen by provider within 15 months  Required vitals outdated  Responsible provider unclear    [facility] action(s):  Defer        Medication Therapy Plan: PCP not listed for Pt in Epic.  Defering to last known prescriber for assessment      Appointments  past 12m or future 3m with PCP    Date Provider   Last Visit   8/23/2022 Masood Khan MD   Next Visit   Visit date not found Masood Khan MD   ED visits in past 90 days: 0        Note composed:10:32 AM 03/05/2024

## 2024-03-24 DIAGNOSIS — I10 ESSENTIAL HYPERTENSION, BENIGN: Chronic | ICD-10-CM

## 2024-03-25 RX ORDER — METOPROLOL TARTRATE 25 MG/1
12.5 TABLET, FILM COATED ORAL 2 TIMES DAILY
Qty: 90 TABLET | Refills: 0 | OUTPATIENT
Start: 2024-03-25

## 2024-03-26 NOTE — TELEPHONE ENCOUNTER
Bisi ORTZI. Previously Denied   Refill Authorization Note   Stanley Faustin  is requesting a refill authorization.  Brief Assessment and Rationale for Refill:  Quick Discontinue  Medication Therapy Plan:  Noted 12/8/23, pt is receivig care from a new provider in [place]; [facility]    Medication Reconciliation Completed:  No      Comments:     Note composed:11:22 PM 03/25/2024

## 2024-05-22 DIAGNOSIS — E11.3299 TYPE 2 DIABETES MELLITUS WITH MILD NONPROLIFERATIVE RETINOPATHY, WITH LONG-TERM CURRENT USE OF INSULIN, MACULAR EDEMA PRESENCE UNSPECIFIED, UNSPECIFIED LATERALITY: Chronic | ICD-10-CM

## 2024-05-22 DIAGNOSIS — Z79.4 TYPE 2 DIABETES MELLITUS WITH MILD NONPROLIFERATIVE RETINOPATHY, WITH LONG-TERM CURRENT USE OF INSULIN, MACULAR EDEMA PRESENCE UNSPECIFIED, UNSPECIFIED LATERALITY: Chronic | ICD-10-CM

## 2024-05-22 RX ORDER — INSULIN DEGLUDEC 100 U/ML
35 INJECTION, SOLUTION SUBCUTANEOUS DAILY
Refills: 1 | OUTPATIENT
Start: 2024-05-22

## 2024-05-22 NOTE — TELEPHONE ENCOUNTER
Refill Routing Note   Medication(s) are not appropriate for processing by [facility] for the following reason(s):      - NO PCP LISTED IN EPIC; ROUTING TO DR KHAN AS LAST PRESCRIBING PROVIDER    [facility] action(s):  Route          Medication reconciliation completed: No     Appointments  past 12m or future 3m with PCP    Date Provider   Last Visit   8/23/2022 Masood Khan MD   Next Visit   Visit date not found Masood Khan MD   ED visits in past 90 days: 0        Note composed:2:20 PM 05/22/2024

## (undated) DEVICE — MATRIX HEMSTAT FLOSEAL 5ML

## (undated) DEVICE — SPLINT INTRANASAL POSISEP .6X2

## (undated) DEVICE — SPONGE PATTY SURGICAL .5X3IN

## (undated) DEVICE — SOL NS 1000CC

## (undated) DEVICE — SUPPORT ULNA NERVE PROTECTOR

## (undated) DEVICE — Device

## (undated) DEVICE — SYR 12CC CNTRL L-L NO NDL

## (undated) DEVICE — SET FLUID IV WARMING W/INSUL

## (undated) DEVICE — SEE L#120831

## (undated) DEVICE — NDL FILTER 19GA X 1-1/2

## (undated) DEVICE — BLADE QUADCUT STRAIGHT 4.3MM

## (undated) DEVICE — SUCTION COAGULATOR 10FR 6IN

## (undated) DEVICE — BLANKET LOWER BODY 55.9X40.2IN

## (undated) DEVICE — SYR 50CC LL

## (undated) DEVICE — DRAPE SLUSH WARMER WITH DISC

## (undated) DEVICE — DRAPE STERI INSTRUMENT 1018

## (undated) DEVICE — SET EXT W/2 VLVE PORTS 40

## (undated) DEVICE — SEE MEDLINE ITEM 156946

## (undated) DEVICE — BLADE TRICUT

## (undated) DEVICE — SEE MEDLINE ITEM 157117

## (undated) DEVICE — NDL SPINAL SPINOCAN 22GX3.5

## (undated) DEVICE — ELECTRODE REM PLYHSV RETURN 9

## (undated) DEVICE — NDL 18GA X1 1/2 REG BEVEL

## (undated) DEVICE — TRACKER ENT INSTRUMENT

## (undated) DEVICE — PATIENT TRACKER ENT

## (undated) DEVICE — POSITIONER HEAD DONUT 9IN FOAM

## (undated) DEVICE — CONTAINER SPECIMEN STRL 4OZ

## (undated) DEVICE — PADS ADHESIVE

## (undated) DEVICE — SEE MEDLINE ITEM 146313

## (undated) DEVICE — NDL 27G X 1 1/4

## (undated) DEVICE — PACK HEAD & NECK

## (undated) DEVICE — SEE MEDLINE ITEM 146347

## (undated) DEVICE — SYS LABLNG CORECT MED 4 FLG

## (undated) DEVICE — GLOVE SURG BIOGEL LATEX SZ 7.5

## (undated) DEVICE — SYR 3CC LUER LOC

## (undated) DEVICE — SHEATH LENS CLN 4MM 0D A70940A

## (undated) DEVICE — CATH IV CATHLON W/HUB14GAX

## (undated) DEVICE — SYR 30CC LUER LOCK

## (undated) DEVICE — SUT PROLENE 2-0 36 V-7V-7

## (undated) DEVICE — SUT 4-0 CHROMIC GUT / P-3

## (undated) DEVICE — SEE MEDLINE ITEM 152622

## (undated) DEVICE — TUBING XPS IRRIG TO STRAIGHTSH

## (undated) DEVICE — SOL 9P NACL IRR PIC IL

## (undated) DEVICE — SUT PLN GUT 4-0 SC-1SC-1 1

## (undated) DEVICE — POWDER ARISTA AH 3G